# Patient Record
Sex: MALE | Race: WHITE | NOT HISPANIC OR LATINO | Employment: FULL TIME | URBAN - METROPOLITAN AREA
[De-identification: names, ages, dates, MRNs, and addresses within clinical notes are randomized per-mention and may not be internally consistent; named-entity substitution may affect disease eponyms.]

---

## 2018-01-12 ENCOUNTER — OFFICE VISIT (OUTPATIENT)
Dept: OTHER | Facility: MEDICAL CENTER | Age: 68
End: 2018-01-12
Payer: COMMERCIAL

## 2018-01-12 ENCOUNTER — HOSPITAL ENCOUNTER (OUTPATIENT)
Facility: MEDICAL CENTER | Age: 68
End: 2018-01-12
Attending: FAMILY MEDICINE
Payer: COMMERCIAL

## 2018-01-12 VITALS
TEMPERATURE: 98.7 F | BODY MASS INDEX: 25.71 KG/M2 | RESPIRATION RATE: 14 BRPM | HEART RATE: 73 BPM | HEIGHT: 72 IN | DIASTOLIC BLOOD PRESSURE: 78 MMHG | SYSTOLIC BLOOD PRESSURE: 116 MMHG | WEIGHT: 189.8 LBS | OXYGEN SATURATION: 100 %

## 2018-01-12 DIAGNOSIS — Z87.898 HISTORY OF VERTIGO: ICD-10-CM

## 2018-01-12 DIAGNOSIS — E55.9 VITAMIN D DEFICIENCY: ICD-10-CM

## 2018-01-12 DIAGNOSIS — E78.5 DYSLIPIDEMIA: ICD-10-CM

## 2018-01-12 DIAGNOSIS — K44.9 HIATAL HERNIA: ICD-10-CM

## 2018-01-12 DIAGNOSIS — Z00.00 WELL ADULT EXAM: ICD-10-CM

## 2018-01-12 DIAGNOSIS — Z28.39 BEHIND ON IMMUNIZATIONS: ICD-10-CM

## 2018-01-12 DIAGNOSIS — N43.3 HYDROCELE IN ADULT: Chronic | ICD-10-CM

## 2018-01-12 DIAGNOSIS — R73.03 PREDIABETES: ICD-10-CM

## 2018-01-12 DIAGNOSIS — Z81.8 FAMILY HISTORY OF FIRST DEGREE RELATIVE WITH DEMENTIA: ICD-10-CM

## 2018-01-12 DIAGNOSIS — Z82.49 FAMILY HISTORY OF HYPERTENSION: ICD-10-CM

## 2018-01-12 DIAGNOSIS — Z80.3 FAMILY HISTORY OF BREAST CANCER IN SISTER: ICD-10-CM

## 2018-01-12 DIAGNOSIS — Z80.0 FAMILY HISTORY OF COLON CANCER IN FATHER: ICD-10-CM

## 2018-01-12 DIAGNOSIS — I10 ESSENTIAL HYPERTENSION: Primary | ICD-10-CM

## 2018-01-12 DIAGNOSIS — M19.90 OSTEOARTHRITIS, UNSPECIFIED OSTEOARTHRITIS TYPE, UNSPECIFIED SITE: ICD-10-CM

## 2018-01-12 DIAGNOSIS — F41.9 ANXIETY: ICD-10-CM

## 2018-01-12 DIAGNOSIS — Z87.448 HISTORY OF NEPHRITIS: ICD-10-CM

## 2018-01-12 LAB
APPEARANCE UR: ABNORMAL
BACTERIA #/AREA URNS HPF: NEGATIVE /HPF
BILIRUB UR QL STRIP.AUTO: NEGATIVE
COLOR UR: YELLOW
CREAT UR-MCNC: 202 MG/DL
EPI CELLS #/AREA URNS HPF: NEGATIVE /HPF
GLUCOSE UR STRIP.AUTO-MCNC: NEGATIVE MG/DL
HYALINE CASTS #/AREA URNS LPF: ABNORMAL /LPF
KETONES UR STRIP.AUTO-MCNC: NEGATIVE MG/DL
LEUKOCYTE ESTERASE UR QL STRIP.AUTO: NEGATIVE
MICRO URNS: ABNORMAL
MICROALBUMIN UR-MCNC: <0.7 MG/DL
MICROALBUMIN/CREAT UR: NORMAL MG/G (ref 0–30)
NITRITE UR QL STRIP.AUTO: NEGATIVE
PH UR STRIP.AUTO: 5 [PH]
PROT UR QL STRIP: NEGATIVE MG/DL
RBC # URNS HPF: ABNORMAL /HPF
RBC UR QL AUTO: NEGATIVE
SP GR UR STRIP.AUTO: 1.03
UROBILINOGEN UR STRIP.AUTO-MCNC: 0.2 MG/DL
WBC #/AREA URNS HPF: ABNORMAL /HPF

## 2018-01-12 PROCEDURE — 99204 OFFICE O/P NEW MOD 45 MIN: CPT | Performed by: FAMILY MEDICINE

## 2018-01-12 NOTE — PROGRESS NOTES
"SUBJECTIVE:    Chief Complaint   Patient presents with   • Establish Care   • Other     History of Nephritis 2017; clinically resolved   • Hiatal Hernia     Small   • Other     History of Vertigo age approx. 64   • Other     History of L knee injury; now osteoarthritis   • Other     Family hx of Dementia (mother)   • Other     Family hx of HTN and Cancer in Father   • Other     Family hx of Breast CA (sister)       Jose Fallon is a 67 y.o. male,   New Patient    PROBLEM #1-HISTORY OF PRESENT ILLNESS  Existing Problems, but not previously addressed by me  PATIENT STATEMENT OF PROBLEM - History of Urological Issue 2017  ONSET - July '17  COURSE - \"gas\" pain, lower abdomen x 2-3 days, improved w/ ibuprofen, then worsened, went to [facility] improved by 4th day.  Left kidney and Left ureter enlarged/dilated, went to Urology ([facility]), U/S revealed \"Nephritis.\"  Went to Nephrologist (Dr. Canas), and F/U labs and imaging were normal.  Saw Urologist (Dr. Feliciano), who did another CT scan.  Asymptomatic presently.   INTENSITY/STATUS/LOCATION/RADIATION - nil now/ asymptomatic/ as above  AGGRAVATORS - ?  RELIEVERS - as above  TREATMENTS/COMPLIANCE/@GOAL? - as above/ good/ clinically yes    PROBLEM #2-HISTORY OF PRESENT ILLNESS  Existing Problem, but not previously addressed by me  PATIENT STATEMENT OF PROBLEM - Family history of Dementia, HTN, Colon Cancer in Father, Breast Cancer in Sister.     No Known Allergies    Patient Active Problem List    Diagnosis Date Noted   • Essential hypertension 01/29/2018   • Dyslipidemia 01/29/2018   • Anxiety 01/29/2018   • Prediabetes 01/29/2018   • Vitamin D deficiency 01/29/2018   • Well adult exam 01/29/2018   • Family history of first degree relative with dementia 01/29/2018   • Family history of colon cancer in father 01/29/2018   • Family history of hypertension 01/29/2018   • Family history of breast cancer in sister 01/29/2018   • Hiatal hernia 01/29/2018   • History of " "vertigo 2018   • History of nephritis 2018   • Osteoarthritis 2018   • Behind on immunizations 2018   • Hydrocele in adult, left 2018       Outpatient Encounter Prescriptions as of 2018   Medication Sig Dispense Refill   • irbesartan-hydrochlorothiazide (AVALIDE) 150-12.5 MG per tablet Take 1 Tab by mouth every day. 90 Tab 3   • simvastatin (ZOCOR) 20 MG Tab Take 1 Tab by mouth every day. 90 Tab 4   • escitalopram (LEXAPRO) 10 MG Tab Take 1 Tab by mouth every day. 90 Tab 4   • metformin ER (GLUCOPHAGE XR) 500 MG TABLET SR 24 HR Take 1 Tab by mouth every day. 90 Tab 4   • aspirin EC (ECOTRIN) 81 MG Tablet Delayed Response Take 1 Tab by mouth.     • Cholecalciferol (VITAMIN D) 2000 units Cap Take 1 Cap by mouth every day.     • Multiple Vitamins-Minerals (OCUVITE ADULT 50+) Cap Take 1 Each by mouth.       No facility-administered encounter medications on file as of 2018.        Social History   Substance Use Topics   • Smoking status: Former Smoker     Packs/day: 1.00     Years: 2.00     Types: Cigarettes     Start date: 1957     Quit date: 1959   • Smokeless tobacco: Never Used   • Alcohol use 4.2 oz/week     7 Cans of beer per week       Family History   Problem Relation Age of Onset   • Dementia Mother 70      @ 81 years   • Hypertension Father 78      @ 79 years   • Cancer Father 77     Colon Cancer   • Kidney Disease Father 78     Kidney failure was cause of death   • Breast Cancer Sister 50   • Heart Attack Paternal Grandmother 78     Fatal MI   • Heart Attack Paternal Grandfather 60     Fatal MI       Patient's Past, Social, and Family History reviewed and updated by me in EPIC today.    REVIEW OF SYMPTOMS:               Pertinent Positives as above.    All other systems reviewed and negative.     OBJECTIVE:    /78   Pulse 73   Temp 37.1 °C (98.7 °F)   Resp 14   Ht 1.816 m (5' 11.5\")   Wt 86.1 kg (189 lb 12.8 oz)   SpO2 100%   BMI 26.10 " kg/m²   Body mass index is 26.1 kg/m².    Well developed, well nourished male, no acute distress, non-ill appearing. Comfortable, appears stated age, pleasant and cooperative  HEAD: atraumatic, normocephalic   EYES: Conjunctiva normal, extra-occular movements intact, PERRLA, acuity grossly intact.   EARS/NOSE/THROAT: TM's normal, no signs or symptoms of infection, no perforation, no hemotympanum, acuity grossly intact. Oropharynx: benign, no lesions noted. Nares: benign.   NECK: supple, no adenopathy, no thyromegaly or nodules, no jugular vein distention, no carotid bruits.   CHEST/LUNGS: clear to auscultation and percussion bilaterally. No adventitious breath sounds.   CARDIOVASCULAR: regular rate and rhythm, no murmur. Point of maximum intensity not displaced. Good central and peripheral pulses.   BACK: no CVA tenderness.   ABDOMEN: soft, non-tender, non-distended, no masses, no hepatosplenomegaly. Normal active bowel tones.   : deferred.   Rectal: deferred.   Extremities: warm/well-perfused, no cyanosis, clubbing, or edema.   SKIN: clear, unbroken, no rashes, normal turgor.   Neuro: Mental Status: Alert and Oriented x 3. CN II-XII grossly intact. Gait normal. Non-focal, intact. Normal strength, sensation    ASSESSMENT:    1. Essential hypertension  irbesartan-hydrochlorothiazide (AVALIDE) 150-12.5 MG per tablet    aspirin EC (ECOTRIN) 81 MG Tablet Delayed Response   2. Dyslipidemia  simvastatin (ZOCOR) 20 MG Tab   3. Anxiety  escitalopram (LEXAPRO) 10 MG Tab   4. Prediabetes  metformin ER (GLUCOPHAGE XR) 500 MG TABLET SR 24 HR   5. Vitamin D deficiency  Cholecalciferol (VITAMIN D) 2000 units Cap   6. Hydrocele in adult, left     7. Well adult exam  Multiple Vitamins-Minerals (OCUVITE ADULT 50+) Cap   8. Family history of first degree relative with dementia     9. Family history of colon cancer in father     10. Family history of hypertension     11. Family history of breast cancer in sister     12. Hiatal  hernia     13. History of vertigo     14. History of nephritis     15. Osteoarthritis, unspecified osteoarthritis type, unspecified site     16. Behind on immunizations         PLAN:    Total Face-to-Face time spent with patient: 60 minutes  Amount of time spent counseling patient and/or coordinating care: 40 minutes    The nature of patient counseling as below:  -Patient Education, including below topics:  -Differential Diagnoses and treatment options discussed  -Risks, benefits, alternatives discussed  -Health Maintenance Exam issues discussed  -Rest/ fluids/ close observation/ OTC medications  -Exercise  -Dietary recommendations discussed  -Weight Loss strategies discussed  -QUIT SMOKING!!!  -Therapeutic Lifestyle Changes discussed    The nature of coordination of care as below:  -Continue (other) present chronic medications  -Blood Pressure Diary  -Labs: Initial Panel A...UACR/UA/U-F2 iso  -Referrals: none  -Other: pre Exec H&P imaging  -Seek medical attention immediately if worse    FOLLOW-UP:  -soon for Exec H&P and F/U  -and sooner if test/consult results warrant  And for Health Care Maintenance Exams  And as needed.

## 2018-01-18 ENCOUNTER — HOSPITAL ENCOUNTER (OUTPATIENT)
Dept: RADIOLOGY | Facility: MEDICAL CENTER | Age: 68
End: 2018-01-18
Attending: FAMILY MEDICINE
Payer: COMMERCIAL

## 2018-01-18 ENCOUNTER — NON-PROVIDER VISIT (OUTPATIENT)
Dept: OTHER | Facility: MEDICAL CENTER | Age: 68
End: 2018-01-18
Payer: COMMERCIAL

## 2018-01-18 DIAGNOSIS — Z23 NEED FOR PNEUMOCOCCAL VACCINATION: ICD-10-CM

## 2018-01-18 DIAGNOSIS — Z00.00 WELL ADULT EXAM: ICD-10-CM

## 2018-01-18 DIAGNOSIS — Z23 NEED FOR TDAP VACCINATION: ICD-10-CM

## 2018-01-18 PROCEDURE — 90715 TDAP VACCINE 7 YRS/> IM: CPT | Performed by: FAMILY MEDICINE

## 2018-01-18 PROCEDURE — 90472 IMMUNIZATION ADMIN EACH ADD: CPT | Performed by: FAMILY MEDICINE

## 2018-01-18 PROCEDURE — 71046 X-RAY EXAM CHEST 2 VIEWS: CPT

## 2018-01-18 PROCEDURE — 90471 IMMUNIZATION ADMIN: CPT | Performed by: FAMILY MEDICINE

## 2018-01-18 PROCEDURE — 306734 HCHG ADVANCED VASCULAR SCREENING

## 2018-01-18 PROCEDURE — 4410556 CT-CARDIAC SCORING

## 2018-01-18 PROCEDURE — 76700 US EXAM ABDOM COMPLETE: CPT

## 2018-01-18 PROCEDURE — 90732 PPSV23 VACC 2 YRS+ SUBQ/IM: CPT | Performed by: FAMILY MEDICINE

## 2018-01-29 PROBLEM — Z80.0 FAMILY HISTORY OF COLON CANCER IN FATHER: Status: ACTIVE | Noted: 2018-01-29

## 2018-01-29 PROBLEM — Z87.448 HISTORY OF NEPHRITIS: Status: ACTIVE | Noted: 2018-01-29

## 2018-01-29 PROBLEM — K44.9 HIATAL HERNIA: Status: ACTIVE | Noted: 2018-01-29

## 2018-01-29 PROBLEM — R73.03 PREDIABETES: Status: ACTIVE | Noted: 2018-01-29

## 2018-01-29 PROBLEM — E78.5 DYSLIPIDEMIA: Status: ACTIVE | Noted: 2018-01-29

## 2018-01-29 PROBLEM — F41.9 ANXIETY: Status: ACTIVE | Noted: 2018-01-29

## 2018-01-29 PROBLEM — E55.9 VITAMIN D DEFICIENCY: Status: ACTIVE | Noted: 2018-01-29

## 2018-01-29 PROBLEM — Z82.49 FAMILY HISTORY OF HYPERTENSION: Status: ACTIVE | Noted: 2018-01-29

## 2018-01-29 PROBLEM — Z00.00 WELL ADULT EXAM: Status: ACTIVE | Noted: 2018-01-29

## 2018-01-29 PROBLEM — M19.90 OSTEOARTHRITIS: Status: ACTIVE | Noted: 2018-01-29

## 2018-01-29 PROBLEM — Z28.39 BEHIND ON IMMUNIZATIONS: Status: ACTIVE | Noted: 2018-01-29

## 2018-01-29 PROBLEM — Z81.8 FAMILY HISTORY OF FIRST DEGREE RELATIVE WITH DEMENTIA: Status: ACTIVE | Noted: 2018-01-29

## 2018-01-29 PROBLEM — Z87.898 HISTORY OF VERTIGO: Status: ACTIVE | Noted: 2018-01-29

## 2018-01-29 PROBLEM — I10 ESSENTIAL HYPERTENSION: Status: ACTIVE | Noted: 2018-01-29

## 2018-01-29 PROBLEM — Z80.3 FAMILY HISTORY OF BREAST CANCER IN SISTER: Status: ACTIVE | Noted: 2018-01-29

## 2018-01-29 RX ORDER — SIMVASTATIN 20 MG
20 TABLET ORAL DAILY
Qty: 90 TAB | Refills: 4
Start: 2018-01-29 | End: 2018-06-28 | Stop reason: SDUPTHER

## 2018-01-29 RX ORDER — IRBESARTAN AND HYDROCHLOROTHIAZIDE 150; 12.5 MG/1; MG/1
1 TABLET, FILM COATED ORAL DAILY
Qty: 90 TAB | Refills: 3
Start: 2018-01-29 | End: 2018-08-23 | Stop reason: SDUPTHER

## 2018-01-29 RX ORDER — ESCITALOPRAM OXALATE 10 MG/1
10 TABLET ORAL DAILY
Qty: 90 TAB | Refills: 4
Start: 2018-01-29 | End: 2018-09-10 | Stop reason: SDUPTHER

## 2018-01-29 RX ORDER — MV-MN/OM3/DHA/EPA/FISH/LUT/ZEA 250-5-1 MG
1 CAPSULE ORAL
COMMUNITY
Start: 2018-01-29 | End: 2022-12-07

## 2018-01-29 RX ORDER — METFORMIN HYDROCHLORIDE 500 MG/1
500 TABLET, EXTENDED RELEASE ORAL DAILY
Qty: 90 TAB | Refills: 4
Start: 2018-01-29 | End: 2018-11-12 | Stop reason: SDUPTHER

## 2018-01-29 RX ORDER — MULTIVIT-MIN/IRON/FOLIC ACID/K 18-600-40
1 CAPSULE ORAL DAILY
COMMUNITY
Start: 2018-01-29 | End: 2020-09-28

## 2018-01-30 NOTE — PATIENT INSTRUCTIONS
Current Outpatient Prescriptions Ordered in [facility]   Medication Sig Dispense Refill   • irbesartan-hydrochlorothiazide (AVALIDE) 150-12.5 MG per tablet Take 1 Tab by mouth every day. 90 Tab 3   • simvastatin (ZOCOR) 20 MG Tab Take 1 Tab by mouth every day. 90 Tab 4   • escitalopram (LEXAPRO) 10 MG Tab Take 1 Tab by mouth every day. 90 Tab 4   • metformin ER (GLUCOPHAGE XR) 500 MG TABLET SR 24 HR Take 1 Tab by mouth every day. 90 Tab 4   • aspirin EC (ECOTRIN) 81 MG Tablet Delayed Response Take 1 Tab by mouth.     • Cholecalciferol (VITAMIN D) 2000 units Cap Take 1 Cap by mouth every day.     • Multiple Vitamins-Minerals (OCUVITE ADULT 50+) Cap Take 1 Each by mouth.       No current Epic-ordered facility-administered medications on file.

## 2018-02-07 PROBLEM — K64.4 INTERNAL AND EXTERNAL HEMORRHOIDS WITHOUT COMPLICATION: Status: ACTIVE | Noted: 2018-02-07

## 2018-02-07 PROBLEM — K64.8 INTERNAL AND EXTERNAL HEMORRHOIDS WITHOUT COMPLICATION: Status: ACTIVE | Noted: 2018-02-07

## 2018-02-07 PROBLEM — K57.30 DIVERTICULOSIS OF COLON: Status: ACTIVE | Noted: 2018-02-07

## 2018-02-08 ENCOUNTER — NON-PROVIDER VISIT (OUTPATIENT)
Dept: OTHER | Facility: MEDICAL CENTER | Age: 68
End: 2018-02-08

## 2018-02-08 ENCOUNTER — OFFICE VISIT (OUTPATIENT)
Dept: OTHER | Facility: MEDICAL CENTER | Age: 68
End: 2018-02-08

## 2018-02-08 VITALS
HEART RATE: 91 BPM | TEMPERATURE: 99.2 F | BODY MASS INDEX: 25.76 KG/M2 | RESPIRATION RATE: 14 BRPM | WEIGHT: 184 LBS | OXYGEN SATURATION: 93 % | DIASTOLIC BLOOD PRESSURE: 70 MMHG | SYSTOLIC BLOOD PRESSURE: 112 MMHG | HEIGHT: 71 IN

## 2018-02-08 DIAGNOSIS — I10 ESSENTIAL HYPERTENSION: ICD-10-CM

## 2018-02-08 DIAGNOSIS — Z00.00 WELL ADULT EXAM: Primary | ICD-10-CM

## 2018-02-08 DIAGNOSIS — I25.84 CORONARY ATHEROSCLEROSIS DUE TO CALCIFIED CORONARY LESION: ICD-10-CM

## 2018-02-08 DIAGNOSIS — R73.09 ELEVATED HEMOGLOBIN A1C: ICD-10-CM

## 2018-02-08 DIAGNOSIS — R94.31 ABNORMAL RESTING ECG FINDINGS: ICD-10-CM

## 2018-02-08 DIAGNOSIS — R93.1 AGATSTON CAC SCORE 200-399: ICD-10-CM

## 2018-02-08 DIAGNOSIS — Z78.9 POSITIVE GENETIC TEST FOR APOLIPOPROTEIN E4 GENOTYPE: ICD-10-CM

## 2018-02-08 DIAGNOSIS — R73.03 PREDIABETES: ICD-10-CM

## 2018-02-08 DIAGNOSIS — E53.8 B12 DEFICIENCY: ICD-10-CM

## 2018-02-08 DIAGNOSIS — I65.22 CAROTID ATHEROSCLEROSIS, LEFT: ICD-10-CM

## 2018-02-08 DIAGNOSIS — Z72.9 LIFESTYLE PROBLEMS: ICD-10-CM

## 2018-02-08 DIAGNOSIS — E78.5 DYSLIPIDEMIA: ICD-10-CM

## 2018-02-08 DIAGNOSIS — I25.10 CORONARY ATHEROSCLEROSIS DUE TO CALCIFIED CORONARY LESION: ICD-10-CM

## 2018-02-08 DIAGNOSIS — N28.1 BILATERAL RENAL CYSTS: ICD-10-CM

## 2018-02-08 DIAGNOSIS — R53.81 PHYSICAL DECONDITIONING: ICD-10-CM

## 2018-02-08 NOTE — PATIENT INSTRUCTIONS
Current Outpatient Prescriptions Ordered in [facility]   Medication Sig Dispense Refill   • irbesartan-hydrochlorothiazide (AVALIDE) 150-12.5 MG per tablet Take 1 Tab by mouth every day. 90 Tab 3   • simvastatin (ZOCOR) 20 MG Tab Take 1 Tab by mouth every day. 90 Tab 4   • escitalopram (LEXAPRO) 10 MG Tab Take 1 Tab by mouth every day. 90 Tab 4   • metformin ER (GLUCOPHAGE XR) 500 MG TABLET SR 24 HR Take 1 Tab by mouth every day. 90 Tab 4   • aspirin EC (ECOTRIN) 81 MG Tablet Delayed Response Take 1 Tab by mouth.     • Cholecalciferol (VITAMIN D) 2000 units Cap Take 1 Cap by mouth every day.     • Multiple Vitamins-Minerals (OCUVITE ADULT 50+) Cap Take 1 Each by mouth.       No current Epic-ordered facility-administered medications on file.

## 2018-02-08 NOTE — PROGRESS NOTES
"[facility] [address]    EXECUTIVE HISTORY AND PHYSICAL  Performed by Dr. Pablito Kessler    SUBJECTIVE:    Chief Complaint   Patient presents with   • Executive Physical   • Abnormal EKG     Resting   • Coronary Artery Disease     Per CTCS of 377.7, multivessel   • Hyperlipidemia     including Apo E4 genotype   • Abnormal Labs   • Other     Mild left carotid atherosclerosis   • Other     small bilateral renal cysts       Jose Fallon is a 67 y.o. male,   Established Patient @ [facility]    Preventive medicine issues discussed:  abuse, aspirin, dental, Alcohol, Tobacco, HIV/AIDS, injuries, mental health/depression, nutrition, exercise, occupational health, UV exposure, advanced directives, Cancer Screening. Vaccines.      PROBLEM #1-HISTORY OF PRESENT ILLNESS  PATIENT STATEMENT OF PROBLEM - Cardiovascular related issues  ONSET - discussed today  COURSE - patient has known dyslipidemia, HTN, and pre-diabetes, and takes simvastatin, avalide, aspirin and metformin.  13 years ago, he had an abnormal Stress ECHOcardiogram (ST depression in V3-V6 with exercise, but normal resting ECG), but resultant Thallium Stress test was reported as normal to the patient.  He denies and known history of CVD/CeVD event.  CT cardiac score 13 years ago was reported as \"very low\".  Current pertinent finding: today's resting ECG is abnormal with ST-T elevation primarily in V2, less so in V3, but normal exercise component.  Also, CT-cardiac score is moderately severe at 377.7, multivessel.  Mild L carotid plaque on Ultrasound.  cIMT Vascular age 68 vs Chronological age of 67 years.  Current pertinent labs:   HIGH & INT RISK: LDL-P/ApoA-1/Sterol Hyperabsorption/FIBR/A1c(5.8%)/Apo E4 genotype/Low Omega 3's/folate/Co-Q10/Fatty Acid imbalance/low normal B12    Counseled.   INTENSITY/STATUS/LOCATION/RADIATION - variable/ present/ as above  AGGRAVATORS - Multifactorial including inadequate Therapeutic Lifestyle Changes " currently  RELIEVERS - meds?  TREATMENTS/COMPLIANCE/@GOAL? - same/ inadequate Therapeutic Lifestyle Changes / no    PROBLEM #2-HISTORY OF PRESENT ILLNESS  PATIENT STATEMENT OF PROBLEM - Asymptomatic bilateral small renal cysts  ONSET - discussed today, previously known  COURSE - asymptomatic. See most recent SOAP note for recent renal history.     No Known Allergies    Patient Active Problem List    Diagnosis Date Noted   • Diverticulosis of colon, per 2/5/16 Colonoscopy 02/07/2018   • Internal and external hemorrhoids without complication, per 2/5/16 Colonoscopy 02/07/2018   • Essential hypertension 01/29/2018   • Dyslipidemia 01/29/2018   • Anxiety 01/29/2018   • Prediabetes 01/29/2018   • Vitamin D deficiency 01/29/2018   • Well adult exam 01/29/2018   • Family history of first degree relative with dementia 01/29/2018   • Family history of colon cancer in father 01/29/2018   • Family history of hypertension 01/29/2018   • Family history of breast cancer in sister 01/29/2018   • Hiatal hernia 01/29/2018   • History of vertigo 01/29/2018   • History of nephritis 01/29/2018   • Osteoarthritis 01/29/2018   • Behind on immunizations 01/29/2018   • Hydrocele in adult, left 01/12/2018       Current Outpatient Prescriptions on File Prior to Visit   Medication Sig Dispense Refill   • irbesartan-hydrochlorothiazide (AVALIDE) 150-12.5 MG per tablet Take 1 Tab by mouth every day. 90 Tab 3   • simvastatin (ZOCOR) 20 MG Tab Take 1 Tab by mouth every day. 90 Tab 4   • escitalopram (LEXAPRO) 10 MG Tab Take 1 Tab by mouth every day. 90 Tab 4   • metformin ER (GLUCOPHAGE XR) 500 MG TABLET SR 24 HR Take 1 Tab by mouth every day. 90 Tab 4   • aspirin EC (ECOTRIN) 81 MG Tablet Delayed Response Take 1 Tab by mouth.     • Cholecalciferol (VITAMIN D) 2000 units Cap Take 1 Cap by mouth every day.     • Multiple Vitamins-Minerals (OCUVITE ADULT 50+) Cap Take 1 Each by mouth.       No current facility-administered medications on file prior  "to visit.        Social History     Social History   • Marital status:      Spouse name: N/A   • Number of children: N/A   • Years of education: N/A     Occupational History   •       [employer]     Social History Main Topics   • Smoking status: Former Smoker     Packs/day: 1.00     Years: 2.00     Types: Cigarettes     Start date: 1957     Quit date: 1959   • Smokeless tobacco: Never Used   • Alcohol use 4.2 oz/week     7 Cans of beer per week   • Drug use: No   • Sexual activity: Not Currently     Other Topics Concern   • Not on file     Social History Narrative   • No narrative on file       Family History   Problem Relation Age of Onset   • Dementia Mother 70      @ 81 years   • Hypertension Father 78      @ 79 years   • Cancer Father 77     Colon Cancer   • Kidney Disease Father 78     Kidney failure was cause of death   • Breast Cancer Sister 50   • Heart Attack Paternal Grandmother 78     Fatal MI   • Heart Attack Paternal Grandfather 60     Fatal MI       Patient's Past, Social, and Family History reviewed     REVIEW OF SYMPTOMS:               Pertinent Positives as above.    All other systems reviewed and negative.     OBJECTIVE:    /70   Pulse 91   Temp 37.3 °C (99.2 °F)   Resp 14   Ht 1.816 m (5' 11.5\")   Wt 83.5 kg (184 lb)   SpO2 93%   BMI 25.31 kg/m²   Body mass index is 25.31 kg/m².    Well developed, well nourished male, no acute distress, non-ill appearing. Comfortable, appears stated age, pleasant and cooperative, Alert and Oriented x 3.   HEAD: atraumatic, normocephalic   EYES: Conjunctiva normal, EOMI, PERRLA, acuity grossly intact.   EARS/NOSE/THROAT: TM's normal, no SSX of infection, no perforation, no hemotympanum, acuity grossly intact. Oropharynx: benign, no lesions noted. Nares: benign.   NECK: supple, no adenopathy, no thyromegaly or nodules, no JVD, no carotid bruits.   CHEST/LUNGS: clear to auscultation and percussion bilaterally. " No adventitious breath sounds.   CARDIOVASCULAR: regular rate and rhythm, no murmur. PMI not displaced. Good central and peripheral pulses.   BACK: no CVA tenderness.   ABDOMEN: soft, non-tender, non-distended, no masses, no hepatosplenomegaly. Normal active bowel tones.   : deferred.   Rectal: deferred.   Extremities: warm/well-perfused, no cyanosis, clubbing, or edema.   SKIN: clear, unbroken, no rashes, normal turgor.   Neuro: Mental Status: Alert and Oriented x 3. CN II-XII grossly intact. Gait normal. Non-focal, intact. Normal strength, sensation    EXERCISE STRESS TEST REPORT:    Interpreted by me    INTERPRETATION:  Resting ECG abnormal with ST-T elevation in V2, and less so in V3. Patient achieved 93% of maximum predicted heart rate with physiological response in blood pressure and no associated ST segment depression.   Poor exercise tolerance, reaching above target HR in Stage 1.  Also, during exercise, specifically no associated worsened ST elevation, no significant ventricular extrasystoles, no ventricular tachycardia, no new atrial fibrillation or supraventricular tachycardia, and  no new heart blocks.  Patient denied chest pain, severe dyspnea, dizziness, or ataxia.     CONCLUSION:  Abnormal Resting ECG with ST-T elevation in V2, and less so in V3. Normal Exercise Stress Test indicating low probability of flow-limiting coronary artery disease.     ASSESSMENT:    Encounter Diagnoses   Name Primary?   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    • Carotid atherosclerosis, left, mild    • Elevated hemoglobin A1c    • Bilateral renal cysts         PLAN:    Total Face-to-Face time spent with patient: 90 minutes  Amount of time spent counseling patient and/or coordinating care: 50 minutes    The nature of patient counseling as below:  -Patient Education  -Differential Diagnoses and treatment options discussed  -Risks, benefits, alternatives discussed  -Labs reviewed with patient in detail  -Imaging/ECG/ETT/PFT/vision/hearing reports reviewed with patient in detail  -Health Maintenance Exam issues discussed  -Exercise  -Dietary recommendations discussed  -I recommended patient take advantage of complimentary Dietician Coaching through [employer]  -Therapeutic Lifestyle Changes discussed    The nature of coordination of care as below:  -Medications added: We discussed possible ezetimibe therapy, but no new ezetimibe prescription today.    -OTC Sublingual Vitamin B12 1000 mcg/day  -Medications discontinued: none  -Medications adjusted: none  -Continue present chronic medications  -Referrals: Cardiology referral    -Intensive Cardiac Rehabilitation referral   -Seek medical attention immediately if worse    FOLLOW-UP:  -With me in 1 month

## 2018-02-08 NOTE — LETTER
"February 8, 2018        Jose Leeanne  [address]  [place] [address]        Dear Jose:    Thank you for participating in [facility].  We covered a great deal of information, and I have summarized my Assessment and Plan below.  Please carefully review all the information in this packet.  I do suggest you schedule an appointment with me in one month for follow-up.    Overall, I consider you to be in good health, although your current lack of exercise, and dietary pattern could both improved.  In terms of Cardiovascular related issues, you have the following: abnormal resting ECG, moderately severe coronary artery calcifications, physical deconditioning, dyslipidemia including Apo E4 genotype, hypertension, pre-diabetes, B12 deficiency, elevated A1c, mild left carotid atherosclerosis.  The best treatment for most of these maladies is Therapeutic Lifestyle Changes.  Specifically, I strongly recommend eating \"real food, mostly plants, not too much\", daily exercise, striving for a normal weight/BMI, active stress management, 7-8 hours of quality sleep per night, a loving social environment, and an altruistic philosophy. In terms of medication, we did discuss ezetimibe for further dyslipidemia treatment, but we will wait to see how your Cardiology consultation goes.  I do want you to begin daily OTC Vitamin B12 sublingual 1000 mcg.  I will also see if you insurance will cover a course of Intensive Cardiac Rehabilitation as we discussed.  If you have any questions or concerns, please don't hesitate to call.        Sincerely,        Pablito Kessler M.D.                  ASSESSMENT:    Encounter Diagnoses   Name Primary?   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    • Carotid atherosclerosis, left, mild    • Elevated hemoglobin A1c    • Bilateral renal cysts    PLAN:    Total Face-to-Face time spent with patient: 90 minutes  Amount of time spent counseling patient and/or coordinating care: 50 minutes    The nature of patient counseling as below:  -Patient Education  -Differential Diagnoses and treatment options discussed  -Risks, benefits, alternatives discussed  -Labs reviewed with patient in detail  -Imaging/ECG/ETT/PFT/vision/hearing reports reviewed with patient in detail  -Health Maintenance Exam issues discussed  -Exercise  -Dietary recommendations discussed  -I recommended patient take advantage of complimentary Dietician Coaching through [employer]  -Therapeutic Lifestyle Changes discussed    The nature of coordination of care as below:  -Medications added: We discussed possible ezetimibe therapy, but no new ezetimibe prescription today.    -OTC Sublingual Vitamin B12 1000 mcg/day  -Medications discontinued: none  -Medications adjusted: none  -Continue present chronic medications  -Referrals: Cardiology referral    -Intensive Cardiac Rehabilitation referral   -Seek medical attention immediately if worse    FOLLOW-UP:  -With me in one month

## 2018-02-09 ENCOUNTER — OFFICE VISIT (OUTPATIENT)
Dept: CARDIOLOGY | Facility: MEDICAL CENTER | Age: 68
End: 2018-02-09
Payer: COMMERCIAL

## 2018-02-09 VITALS
BODY MASS INDEX: 25.47 KG/M2 | DIASTOLIC BLOOD PRESSURE: 72 MMHG | SYSTOLIC BLOOD PRESSURE: 120 MMHG | HEART RATE: 82 BPM | OXYGEN SATURATION: 94 % | WEIGHT: 188 LBS | HEIGHT: 72 IN

## 2018-02-09 DIAGNOSIS — I10 ESSENTIAL HYPERTENSION: ICD-10-CM

## 2018-02-09 DIAGNOSIS — E78.5 DYSLIPIDEMIA: ICD-10-CM

## 2018-02-09 DIAGNOSIS — R94.31 NONSPECIFIC ABNORMAL ELECTROCARDIOGRAM (ECG) (EKG): ICD-10-CM

## 2018-02-09 DIAGNOSIS — R93.1 AGATSTON CAC SCORE 200-399: ICD-10-CM

## 2018-02-09 PROCEDURE — 99245 OFF/OP CONSLTJ NEW/EST HI 55: CPT | Performed by: INTERNAL MEDICINE

## 2018-02-09 ASSESSMENT — ENCOUNTER SYMPTOMS
FALLS: 0
DEPRESSION: 0
DIZZINESS: 0
LOSS OF CONSCIOUSNESS: 0
PALPITATIONS: 0
PND: 0
ABDOMINAL PAIN: 0
ORTHOPNEA: 0
SHORTNESS OF BREATH: 0

## 2018-02-09 NOTE — LETTER
"     [facility]   [address]   [address]  [place] [address]  Phone: [phone]  Fax: [phone]              Jose Fallon  1950    Encounter Date: 2018    Heike Azul M.D.          PROGRESS NOTE:  Subjective:   Jose Fallon is a 67 y.o. male with hypertension, hyperlipidemia and prediabetes who was referred to cardiology clinic for an abnormal EKG. Patient underwent a treadmill test about 13 years ago which was abnormal. Thereafter he was referred for a pharmacologic test which was normal. Patient is currently a part of the [facility] health program. He underwent a coronary calcium score, detailed below. He subsequently saw Dr. Kessler and underwent a treadmill test, detailed below. He was also noted to have an abnormal EKG and was referred here for further management.    Patient does not exercise much. He can do yard work without any symptoms. Does play golf but does not actually walk the whole distance. Denies any chest discomfort or dyspnea. No palpitations or dizziness.    His blood pressure is usually well-controlled like it is today.    He has been tolerating his Zocor without any side effects.    About 5 or 6 years ago, patient reports having 2 episodes of right-sided weakness that was transient. At that time he was evaluated by neurology and was thought to have atypical migraines.    Past Medical History:   Diagnosis Date   • Anxiety     on Lexapro since    • Headache     \"life long\" per pt   • Hyperlipidemia    • Hypertension    • Vertigo      Past Surgical History:   Procedure Laterality Date   • HERNIA REPAIR  2006     Family History   Problem Relation Age of Onset   • Dementia Mother 70      @ 81 years   • Hypertension Father 78      @ 79 years   • Cancer Father 77     Colon Cancer   • Kidney Disease Father 78     Kidney failure was cause of death   • Breast Cancer Sister 50   • Heart Attack Paternal " "Grandmother 78     Fatal MI   • Heart Attack Paternal Grandfather 60     Fatal MI     History   Smoking Status   • Former Smoker   • Packs/day: 1.00   • Years: 2.00   • Types: Cigarettes   • Start date: 12/20/1957   • Quit date: 12/20/1959   Smokeless Tobacco   • Never Used     Drinks one beer per night. No recreational drug use.    No Known Allergies  Outpatient Encounter Prescriptions as of 2/9/2018   Medication Sig Dispense Refill   • irbesartan-hydrochlorothiazide (AVALIDE) 150-12.5 MG per tablet Take 1 Tab by mouth every day. 90 Tab 3   • simvastatin (ZOCOR) 20 MG Tab Take 1 Tab by mouth every day. 90 Tab 4   • escitalopram (LEXAPRO) 10 MG Tab Take 1 Tab by mouth every day. 90 Tab 4   • metformin ER (GLUCOPHAGE XR) 500 MG TABLET SR 24 HR Take 1 Tab by mouth every day. 90 Tab 4   • aspirin EC (ECOTRIN) 81 MG Tablet Delayed Response Take 1 Tab by mouth.     • Cholecalciferol (VITAMIN D) 2000 units Cap Take 1 Cap by mouth every day.     • Multiple Vitamins-Minerals (OCUVITE ADULT 50+) Cap Take 1 Each by mouth.       No facility-administered encounter medications on file as of 2/9/2018.      Review of Systems   Constitutional: Negative for malaise/fatigue.   Respiratory: Negative for shortness of breath.    Cardiovascular: Negative for chest pain, palpitations, orthopnea, leg swelling and PND.   Gastrointestinal: Negative for abdominal pain.   Musculoskeletal: Negative for falls.   Skin: Negative.    Neurological: Negative for dizziness and loss of consciousness.   Psychiatric/Behavioral: Negative for depression.   All other systems reviewed and are negative.       Objective:   /72   Pulse 82   Ht 1.816 m (5' 11.5\")   Wt 85.3 kg (188 lb)   SpO2 94%   BMI 25.86 kg/m²      Physical Exam   Constitutional: He is oriented to person, place, and time. No distress.   HENT:   Head: Normocephalic and atraumatic.   Eyes: Conjunctivae are normal.   Neck: Normal range of motion. Neck supple. No JVD present.   "   Cardiovascular: Normal rate, regular rhythm and normal heart sounds.  Exam reveals no gallop and no friction rub.    No murmur heard.  Pulmonary/Chest: Effort normal and breath sounds normal. No respiratory distress. He has no wheezes. He has no rales.   Abdominal: Soft. There is no tenderness.   Musculoskeletal: He exhibits no edema.   Neurological: He is alert and oriented to person, place, and time.   Skin: Skin is warm and dry. He is not diaphoretic.   Psychiatric: He has a normal mood and affect.   Nursing note and vitals reviewed.    EKG performed in 2005 was reviewed and showed normal sinus rhythm, left axis deviation, Q waves in V1 and V2. No ST-T wave changes.    Exercise treadmill test performed in February 2018. Tracings were personally reviewed. Baseline EKG shows normal sinus rhythm. Q waves in V1 through V3. Upsloping ST elevation and J-point elevation seen in the V2.  Patient exercised 2 minutes and 43 seconds on the Nain protocol. Test was reportedly stopped for target heart rate achieved. Patient denies having any symptoms. No ischemic ST-T wave changes were seen. 93% of maximum predicted target heart rate was achieved.    CT coronary calcium scoring performed in January 2018. Report was reviewed and showed a total coronary calcium score of 377.    Labs performed in January 2018 was reviewed and showed normal hemoglobin. Normal creatinine.   Total cholesterol 137, LDL 76, HDL 60, triglycerides 49.    Assessment:     1. Essential hypertension  Echocardiogram Comp w/ Cont   2. Dyslipidemia     3. Nonspecific abnormal electrocardiogram (ECG) (EKG)  Echocardiogram Comp w/ Cont   4. Agatston CAC score 200-399         Medical Decision Making:  Today's Assessment / Status / Plan:     Elevated coronary calcium score:  Abnormal EKG:     His EKG in 2005 showed anteroseptal Q waves which appears to have progressed mildly compared to the EKG performed this month. Borderline ST elevation in lead V2 is of  "unclear etiology. Patient is asymptomatic albeit his functional capacity is overall reduced significantly. He did achieve target heart rate on the treadmill test however ischemia at higher workload cannot be ruled out. I informed the patient about this and told him that he should ideally get a pharmacologic study to \"rule out\" ischemia given his decreased functional capacity on the treadmill test. However since the patient is asymptomatic, despite having elevated coronary calcium score there is no clear indication for further ischemic evaluation. Patient is agreeable with this plan and would like to defer for now. He will start a low level exercise program and increase as tolerated. Should he start having symptoms, he will let us know.  I will refer him for an echocardiogram to evaluate his wall motion given his EKG findings.    Focal weakness: Occurred about 6 years ago or so at which time it was thought to be possibly be migraines. He has not had any recurrent symptoms since. I will request an agitated saline contrast study during the echocardiogram that has been requested above.    Hypertension: Patient's blood pressure is at goal. Continue irbesartan and hydrochlorothiazide at current dose. His renal function has been stable.    Hyperlipidemia: LDL is at goal. Continue Zocor at current dose.    Return to clinic in 3 months or earlier if needed.    Thank you for allowing me to participate in the care of this patient. Please do not hesitate to contact me with any questions.    Heike Azul MD  Cardiologist  [facility]    PLEASE NOTE: This dictation was created using voice recognition software.           Pablito Kessler M.D.  [address]  [address]  [facility] [address]  VIA In Basket                 "

## 2018-02-09 NOTE — PROGRESS NOTES
"Subjective:   Jose Fallon is a 67 y.o. male with hypertension, hyperlipidemia and prediabetes who was referred to cardiology clinic for an abnormal EKG. Patient underwent a treadmill test about 13 years ago which was abnormal. Thereafter he was referred for a pharmacologic test which was normal. Patient is currently a part of the [facility] health program. He underwent a coronary calcium score, detailed below. He subsequently saw Dr. Kessler and underwent a treadmill test, detailed below. He was also noted to have an abnormal EKG and was referred here for further management.    Patient does not exercise much. He can do yard work without any symptoms. Does play golf but does not actually walk the whole distance. Denies any chest discomfort or dyspnea. No palpitations or dizziness.    His blood pressure is usually well-controlled like it is today.    He has been tolerating his Zocor without any side effects.    About 5 or 6 years ago, patient reports having 2 episodes of right-sided weakness that was transient. At that time he was evaluated by neurology and was thought to have atypical migraines.    Past Medical History:   Diagnosis Date   • Anxiety     on Lexapro since    • Headache     \"life long\" per pt   • Hyperlipidemia    • Hypertension    • Vertigo      Past Surgical History:   Procedure Laterality Date   • HERNIA REPAIR  2006     Family History   Problem Relation Age of Onset   • Dementia Mother 70      @ 81 years   • Hypertension Father 78      @ 79 years   • Cancer Father 77     Colon Cancer   • Kidney Disease Father 78     Kidney failure was cause of death   • Breast Cancer Sister 50   • Heart Attack Paternal Grandmother 78     Fatal MI   • Heart Attack Paternal Grandfather 60     Fatal MI     History   Smoking Status   • Former Smoker   • Packs/day: 1.00   • Years: 2.00   • Types: Cigarettes   • Start date: 1957   • Quit date: 1959   Smokeless Tobacco   • Never Used     Drinks " "one beer per night. No recreational drug use.    No Known Allergies  Outpatient Encounter Prescriptions as of 2/9/2018   Medication Sig Dispense Refill   • irbesartan-hydrochlorothiazide (AVALIDE) 150-12.5 MG per tablet Take 1 Tab by mouth every day. 90 Tab 3   • simvastatin (ZOCOR) 20 MG Tab Take 1 Tab by mouth every day. 90 Tab 4   • escitalopram (LEXAPRO) 10 MG Tab Take 1 Tab by mouth every day. 90 Tab 4   • metformin ER (GLUCOPHAGE XR) 500 MG TABLET SR 24 HR Take 1 Tab by mouth every day. 90 Tab 4   • aspirin EC (ECOTRIN) 81 MG Tablet Delayed Response Take 1 Tab by mouth.     • Cholecalciferol (VITAMIN D) 2000 units Cap Take 1 Cap by mouth every day.     • Multiple Vitamins-Minerals (OCUVITE ADULT 50+) Cap Take 1 Each by mouth.       No facility-administered encounter medications on file as of 2/9/2018.      Review of Systems   Constitutional: Negative for malaise/fatigue.   Respiratory: Negative for shortness of breath.    Cardiovascular: Negative for chest pain, palpitations, orthopnea, leg swelling and PND.   Gastrointestinal: Negative for abdominal pain.   Musculoskeletal: Negative for falls.   Skin: Negative.    Neurological: Negative for dizziness and loss of consciousness.   Psychiatric/Behavioral: Negative for depression.   All other systems reviewed and are negative.       Objective:   /72   Pulse 82   Ht 1.816 m (5' 11.5\")   Wt 85.3 kg (188 lb)   SpO2 94%   BMI 25.86 kg/m²     Physical Exam   Constitutional: He is oriented to person, place, and time. No distress.   HENT:   Head: Normocephalic and atraumatic.   Eyes: Conjunctivae are normal.   Neck: Normal range of motion. Neck supple. No JVD present.   Cardiovascular: Normal rate, regular rhythm and normal heart sounds.  Exam reveals no gallop and no friction rub.    No murmur heard.  Pulmonary/Chest: Effort normal and breath sounds normal. No respiratory distress. He has no wheezes. He has no rales.   Abdominal: Soft. There is no " tenderness.   Musculoskeletal: He exhibits no edema.   Neurological: He is alert and oriented to person, place, and time.   Skin: Skin is warm and dry. He is not diaphoretic.   Psychiatric: He has a normal mood and affect.   Nursing note and vitals reviewed.    EKG performed in 2005 was reviewed and showed normal sinus rhythm, left axis deviation, Q waves in V1 and V2. No ST-T wave changes.    Exercise treadmill test performed in February 2018. Tracings were personally reviewed. Baseline EKG shows normal sinus rhythm. Q waves in V1 through V3. Upsloping ST elevation and J-point elevation seen in the V2.  Patient exercised 2 minutes and 43 seconds on the Nain protocol. Test was reportedly stopped for target heart rate achieved. Patient denies having any symptoms. No ischemic ST-T wave changes were seen. 93% of maximum predicted target heart rate was achieved.    CT coronary calcium scoring performed in January 2018. Report was reviewed and showed a total coronary calcium score of 377.    Labs performed in January 2018 was reviewed and showed normal hemoglobin. Normal creatinine.   Total cholesterol 137, LDL 76, HDL 60, triglycerides 49.    Assessment:     1. Essential hypertension  Echocardiogram Comp w/ Cont   2. Dyslipidemia     3. Nonspecific abnormal electrocardiogram (ECG) (EKG)  Echocardiogram Comp w/ Cont   4. Agatston CAC score 200-399         Medical Decision Making:  Today's Assessment / Status / Plan:     Elevated coronary calcium score:  Abnormal EKG:     His EKG in 2005 showed anteroseptal Q waves which appears to have progressed mildly compared to the EKG performed this month. Borderline ST elevation in lead V2 is of unclear etiology. Patient is asymptomatic albeit his functional capacity is overall reduced significantly. He did achieve target heart rate on the treadmill test however ischemia at higher workload cannot be ruled out. I informed the patient about this and told him that he should ideally  "get a pharmacologic study to \"rule out\" ischemia given his decreased functional capacity on the treadmill test. However since the patient is asymptomatic, despite having elevated coronary calcium score there is no clear indication for further ischemic evaluation. Patient is agreeable with this plan and would like to defer for now. He will start a low level exercise program and increase as tolerated. Should he start having symptoms, he will let us know.  I will refer him for an echocardiogram to evaluate his wall motion given his EKG findings.    Focal weakness: Occurred about 6 years ago or so at which time it was thought to be possibly be migraines. He has not had any recurrent symptoms since. I will request an agitated saline contrast study during the echocardiogram that has been requested above.    Hypertension: Patient's blood pressure is at goal. Continue irbesartan and hydrochlorothiazide at current dose. His renal function has been stable.    Hyperlipidemia: LDL is at goal. Continue Zocor at current dose.    Return to clinic in 3 months or earlier if needed.    Thank you for allowing me to participate in the care of this patient. Please do not hesitate to contact me with any questions.    Heike Azul MD  Cardiologist  [facility]    PLEASE NOTE: This dictation was created using voice recognition software.       "

## 2018-02-13 ENCOUNTER — HOSPITAL ENCOUNTER (OUTPATIENT)
Dept: CARDIOLOGY | Facility: MEDICAL CENTER | Age: 68
End: 2018-02-13
Attending: INTERNAL MEDICINE
Payer: COMMERCIAL

## 2018-02-13 DIAGNOSIS — I10 ESSENTIAL HYPERTENSION: ICD-10-CM

## 2018-02-13 DIAGNOSIS — R94.31 NONSPECIFIC ABNORMAL ELECTROCARDIOGRAM (ECG) (EKG): ICD-10-CM

## 2018-02-13 LAB
LV EJECT FRACT  99904: 60
LV EJECT FRACT MOD 2C 99903: 64.79
LV EJECT FRACT MOD 4C 99902: 65.69
LV EJECT FRACT MOD BP 99901: 64.22

## 2018-02-13 PROCEDURE — 93306 TTE W/DOPPLER COMPLETE: CPT

## 2018-02-17 PROBLEM — I35.8 AORTIC VALVE SCLEROSIS: Status: ACTIVE | Noted: 2018-02-17

## 2018-02-17 PROBLEM — Q21.12 PFO (PATENT FORAMEN OVALE): Status: ACTIVE | Noted: 2018-02-17

## 2018-03-05 ENCOUNTER — OFFICE VISIT (OUTPATIENT)
Dept: OTHER | Facility: MEDICAL CENTER | Age: 68
End: 2018-03-05
Payer: COMMERCIAL

## 2018-03-05 VITALS
SYSTOLIC BLOOD PRESSURE: 118 MMHG | OXYGEN SATURATION: 98 % | TEMPERATURE: 97.7 F | DIASTOLIC BLOOD PRESSURE: 72 MMHG | HEART RATE: 78 BPM | RESPIRATION RATE: 14 BRPM

## 2018-03-05 DIAGNOSIS — I25.84 CORONARY ATHEROSCLEROSIS DUE TO CALCIFIED CORONARY LESION: Primary | ICD-10-CM

## 2018-03-05 DIAGNOSIS — Q21.12 PFO (PATENT FORAMEN OVALE): ICD-10-CM

## 2018-03-05 DIAGNOSIS — I25.10 CORONARY ATHEROSCLEROSIS DUE TO CALCIFIED CORONARY LESION: Primary | ICD-10-CM

## 2018-03-05 DIAGNOSIS — I35.8 AORTIC VALVE SCLEROSIS: ICD-10-CM

## 2018-03-05 PROCEDURE — 99214 OFFICE O/P EST MOD 30 MIN: CPT | Performed by: FAMILY MEDICINE

## 2018-03-05 NOTE — PATIENT INSTRUCTIONS
Current Outpatient Prescriptions Ordered in [facility]   Medication Sig Dispense Refill   • irbesartan-hydrochlorothiazide (AVALIDE) 150-12.5 MG per tablet Take 1 Tab by mouth every day. 90 Tab 3   • simvastatin (ZOCOR) 20 MG Tab Take 1 Tab by mouth every day. 90 Tab 4   • escitalopram (LEXAPRO) 10 MG Tab Take 1 Tab by mouth every day. 90 Tab 4   • metformin ER (GLUCOPHAGE XR) 500 MG TABLET SR 24 HR Take 1 Tab by mouth every day. 90 Tab 4   • aspirin EC (ECOTRIN) 81 MG Tablet Delayed Response Take 1 Tab by mouth.     • Cholecalciferol (VITAMIN D) 2000 units Cap Take 1 Cap by mouth every day.     • Multiple Vitamins-Minerals (OCUVITE ADULT 50+) Cap Take 1 Each by mouth.       No current Epic-ordered facility-administered medications on file.

## 2018-04-20 ENCOUNTER — OFFICE VISIT (OUTPATIENT)
Dept: OTHER | Facility: MEDICAL CENTER | Age: 68
End: 2018-04-20
Payer: COMMERCIAL

## 2018-04-20 VITALS — HEART RATE: 72 BPM | RESPIRATION RATE: 16 BRPM

## 2018-04-20 DIAGNOSIS — H93.8X1 EAR FULLNESS, RIGHT: Primary | ICD-10-CM

## 2018-04-20 PROCEDURE — 99213 OFFICE O/P EST LOW 20 MIN: CPT | Performed by: FAMILY MEDICINE

## 2018-04-20 NOTE — PATIENT INSTRUCTIONS
Current Outpatient Prescriptions Ordered in [facility]   Medication Sig Dispense Refill   • irbesartan-hydrochlorothiazide (AVALIDE) 150-12.5 MG per tablet Take 1 Tab by mouth every day. 90 Tab 3   • simvastatin (ZOCOR) 20 MG Tab Take 1 Tab by mouth every day. 90 Tab 4   • escitalopram (LEXAPRO) 10 MG Tab Take 1 Tab by mouth every day. 90 Tab 4   • metformin ER (GLUCOPHAGE XR) 500 MG TABLET SR 24 HR Take 1 Tab by mouth every day. 90 Tab 4   • aspirin EC (ECOTRIN) 81 MG Tablet Delayed Response Take 1 Tab by mouth.     • Cholecalciferol (VITAMIN D) 2000 units Cap Take 1 Cap by mouth every day.     • Multiple Vitamins-Minerals (OCUVITE ADULT 50+) Cap Take 1 Each by mouth.       No current Epic-ordered facility-administered medications on file.

## 2018-04-20 NOTE — PROGRESS NOTES
SUBJECTIVE:    Chief Complaint   Patient presents with   • Ear Fullness       Jose Fallon is a 67 y.o. male,   Established Patient     PROBLEM #1-HISTORY OF PRESENT ILLNESS  New Problem  PATIENT STATEMENT OF PROBLEM - R ear symptoms  ONSET - 1 week  COURSE - initially some mild fullness, and mild rhinorrhea (clear), then some mild pain in past 2 days.   INTENSITY/STATUS/LOCATION/RADIATION - mild / present/ as above  AGGRAVATORS - ?  RELIEVERS - none. Valsalva is not difficult in either ear  TREATMENTS/COMPLIANCE/@GOAL? 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 02/08/2018   • B12 deficiency 02/08/2018   • Lifestyle problems 02/08/2018   • Physical deconditioning 02/08/2018   • Moderately severe multivessel coronary atherosclerosis due to calcified coronary lesions 02/08/2018   • Agatston CAC score 200-399 02/08/2018   • Abnormal resting ECG findings 02/08/2018   • Diverticulosis of colon, per 2/5/16 Colonoscopy 02/07/2018   • Internal and external hemorrhoids without complication, per 2/5/16 Colonoscopy 02/07/2018   • Essential hypertension 01/29/2018   • Dyslipidemia 01/29/2018   • Anxiety 01/29/2018   • Prediabetes 01/29/2018   • Vitamin D deficiency 01/29/2018   • Well adult exam 01/29/2018   • Family history of first degree relative with dementia 01/29/2018   • Family history of colon cancer in father 01/29/2018   • Family history of hypertension 01/29/2018   • Family history of breast cancer in sister 01/29/2018   • Hiatal hernia 01/29/2018   • History of vertigo 01/29/2018   • History of nephritis 01/29/2018   • Osteoarthritis 01/29/2018    • Behind on immunizations 2018   • Hydrocele in adult, left 2018       Outpatient Encounter Prescriptions as of 2018   Medication Sig Dispense Refill   • irbesartan-hydrochlorothiazide (AVALIDE) 150-12.5 MG per tablet Take 1 Tab by mouth every day. 90 Tab 3   • simvastatin (ZOCOR) 20 MG Tab Take 1 Tab by mouth every day. 90 Tab 4   • escitalopram (LEXAPRO) 10 MG Tab Take 1 Tab by mouth every day. 90 Tab 4   • metformin ER (GLUCOPHAGE XR) 500 MG TABLET SR 24 HR Take 1 Tab by mouth every day. 90 Tab 4   • aspirin EC (ECOTRIN) 81 MG Tablet Delayed Response Take 1 Tab by mouth.     • Cholecalciferol (VITAMIN D) 2000 units Cap Take 1 Cap by mouth every day.     • Multiple Vitamins-Minerals (OCUVITE ADULT 50+) Cap Take 1 Each by mouth.       No facility-administered encounter medications on file as of 2018.        Social History   Substance Use Topics   • Smoking status: Former Smoker     Packs/day: 1.00     Years: 2.00     Types: Cigarettes     Start date: 1957     Quit date: 1959   • Smokeless tobacco: Never Used   • Alcohol use 4.2 oz/week     7 Cans of beer per week       Family History   Problem Relation Age of Onset   • Dementia Mother 70      @ 81 years   • Hypertension Father 78      @ 79 years   • Cancer Father 77     Colon Cancer   • Kidney Disease Father 78     Kidney failure was cause of death   • Breast Cancer Sister 50   • Heart Attack Paternal Grandmother 78     Fatal MI   • Heart Attack Paternal Grandfather 60     Fatal MI       Patient's Past, Social, and Family History reviewed and updated by me in EPIC today.    REVIEW OF SYMPTOMS:               Pertinent Positives as above.    All other systems reviewed and negative.     OBJECTIVE:    Pulse 72   Resp 16   There is no height or weight on file to calculate BMI.    Well developed, well nourished male, no acute distress, non-ill appearing. Comfortable, appears stated age, pleasant and cooperative  HEAD:  atraumatic, normocephalic   EYES: Conjunctiva normal, extra-occular movements intact, PERRLA, acuity grossly intact.   EARS/NOSE/THROAT: TM's normal, no signs or symptoms of infection, no perforation, no hemotympanum, acuity grossly intact. Oropharynx: benign, no lesions noted. Nares: benign.   NECK: supple, no adenopathy, no thyromegaly or nodules, no jugular vein distention, no carotid bruits.   CHEST/LUNGS: clear to auscultation and percussion bilaterally. No adventitious breath sounds.   CARDIOVASCULAR: regular rate and rhythm, no murmur. Point of maximum intensity not displaced. Good central and peripheral pulses.   BACK: no CVA tenderness.   ABDOMEN: soft, non-tender, non-distended, no masses, no hepatosplenomegaly. Normal active bowel tones.   : deferred.   Rectal: deferred.   Extremities: warm/well-perfused, no cyanosis, clubbing, or edema.   SKIN: clear, unbroken, no rashes, normal turgor.   Neuro: Mental Status: Alert and Oriented x 3. CN II-XII grossly intact. Gait normal. Non-focal, intact. Normal strength, sensation    ASSESSMENT:    1. Ear fullness, right         PLAN:    Total Face-to-Face time spent with patient: 25 minutes  Amount of time spent counseling patient and/or coordinating care: 15 minutes    The nature of patient counseling as below:  -Patient Education, including below topics:  -Differential Diagnoses and treatment options discussed  -Risks, benefits, alternatives discussed  -Close observation/ OTC medications    The nature of coordination of care as below:  -Medications added: OTC Flonase 2 sprays each nostril twice daily (finish bottle)  -Continue (other) present chronic medications  -Seek medical attention immediately if worse    FOLLOW-UP:  For previously scheduled f/u appt  And as needed.

## 2018-05-07 ENCOUNTER — HOSPITAL ENCOUNTER (OUTPATIENT)
Dept: LAB | Facility: MEDICAL CENTER | Age: 68
End: 2018-05-07
Attending: INTERNAL MEDICINE
Payer: COMMERCIAL

## 2018-05-07 LAB
25(OH)D3 SERPL-MCNC: 33 NG/ML (ref 30–100)
ALBUMIN SERPL BCP-MCNC: 4 G/DL (ref 3.2–4.9)
ALBUMIN/GLOB SERPL: 1.2 G/DL
ALP SERPL-CCNC: 55 U/L (ref 30–99)
ALT SERPL-CCNC: 16 U/L (ref 2–50)
ANION GAP SERPL CALC-SCNC: 7 MMOL/L (ref 0–11.9)
APPEARANCE UR: CLEAR
AST SERPL-CCNC: 22 U/L (ref 12–45)
BASOPHILS # BLD AUTO: 0.8 % (ref 0–1.8)
BASOPHILS # BLD: 0.04 K/UL (ref 0–0.12)
BILIRUB SERPL-MCNC: 0.7 MG/DL (ref 0.1–1.5)
BILIRUB UR QL STRIP.AUTO: NEGATIVE
BUN SERPL-MCNC: 19 MG/DL (ref 8–22)
CALCIUM SERPL-MCNC: 9.1 MG/DL (ref 8.5–10.5)
CHLORIDE SERPL-SCNC: 105 MMOL/L (ref 96–112)
CO2 SERPL-SCNC: 29 MMOL/L (ref 20–33)
COLOR UR: YELLOW
CREAT SERPL-MCNC: 1.09 MG/DL (ref 0.5–1.4)
CREAT UR-MCNC: 165.6 MG/DL
EOSINOPHIL # BLD AUTO: 0.29 K/UL (ref 0–0.51)
EOSINOPHIL NFR BLD: 5.7 % (ref 0–6.9)
ERYTHROCYTE [DISTWIDTH] IN BLOOD BY AUTOMATED COUNT: 42.8 FL (ref 35.9–50)
FERRITIN SERPL-MCNC: 102.1 NG/ML (ref 22–322)
GLOBULIN SER CALC-MCNC: 3.3 G/DL (ref 1.9–3.5)
GLUCOSE SERPL-MCNC: 96 MG/DL (ref 65–99)
GLUCOSE UR STRIP.AUTO-MCNC: NEGATIVE MG/DL
HCT VFR BLD AUTO: 42.1 % (ref 42–52)
HGB BLD-MCNC: 13.4 G/DL (ref 14–18)
IMM GRANULOCYTES # BLD AUTO: 0.01 K/UL (ref 0–0.11)
IMM GRANULOCYTES NFR BLD AUTO: 0.2 % (ref 0–0.9)
IRON SATN MFR SERPL: 25 % (ref 15–55)
IRON SERPL-MCNC: 84 UG/DL (ref 50–180)
KETONES UR STRIP.AUTO-MCNC: NEGATIVE MG/DL
LEUKOCYTE ESTERASE UR QL STRIP.AUTO: NEGATIVE
LYMPHOCYTES # BLD AUTO: 1.34 K/UL (ref 1–4.8)
LYMPHOCYTES NFR BLD: 26.3 % (ref 22–41)
MAGNESIUM SERPL-MCNC: 1.7 MG/DL (ref 1.5–2.5)
MCH RBC QN AUTO: 29.6 PG (ref 27–33)
MCHC RBC AUTO-ENTMCNC: 31.8 G/DL (ref 33.7–35.3)
MCV RBC AUTO: 93.1 FL (ref 81.4–97.8)
MICRO URNS: NORMAL
MONOCYTES # BLD AUTO: 0.36 K/UL (ref 0–0.85)
MONOCYTES NFR BLD AUTO: 7.1 % (ref 0–13.4)
NEUTROPHILS # BLD AUTO: 3.05 K/UL (ref 1.82–7.42)
NEUTROPHILS NFR BLD: 59.9 % (ref 44–72)
NITRITE UR QL STRIP.AUTO: NEGATIVE
NRBC # BLD AUTO: 0 K/UL
NRBC BLD-RTO: 0 /100 WBC
PH UR STRIP.AUTO: 5 [PH]
PLATELET # BLD AUTO: 221 K/UL (ref 164–446)
PMV BLD AUTO: 10.4 FL (ref 9–12.9)
POTASSIUM SERPL-SCNC: 3.9 MMOL/L (ref 3.6–5.5)
PROT SERPL-MCNC: 7.3 G/DL (ref 6–8.2)
PROT UR QL STRIP: NEGATIVE MG/DL
PROT UR-MCNC: 9.8 MG/DL (ref 0–15)
PROT/CREAT UR: 59 MG/G (ref 15–68)
RBC # BLD AUTO: 4.52 M/UL (ref 4.7–6.1)
RBC UR QL AUTO: NEGATIVE
SODIUM SERPL-SCNC: 141 MMOL/L (ref 135–145)
SP GR UR STRIP.AUTO: 1.03
TIBC SERPL-MCNC: 337 UG/DL (ref 250–450)
UROBILINOGEN UR STRIP.AUTO-MCNC: 0.2 MG/DL
WBC # BLD AUTO: 5.1 K/UL (ref 4.8–10.8)

## 2018-05-07 PROCEDURE — 82570 ASSAY OF URINE CREATININE: CPT

## 2018-05-07 PROCEDURE — 82306 VITAMIN D 25 HYDROXY: CPT

## 2018-05-07 PROCEDURE — 80053 COMPREHEN METABOLIC PANEL: CPT

## 2018-05-07 PROCEDURE — 83550 IRON BINDING TEST: CPT

## 2018-05-07 PROCEDURE — 84156 ASSAY OF PROTEIN URINE: CPT

## 2018-05-07 PROCEDURE — 81003 URINALYSIS AUTO W/O SCOPE: CPT

## 2018-05-07 PROCEDURE — 82728 ASSAY OF FERRITIN: CPT

## 2018-05-07 PROCEDURE — 83540 ASSAY OF IRON: CPT

## 2018-05-07 PROCEDURE — 83735 ASSAY OF MAGNESIUM: CPT

## 2018-05-07 PROCEDURE — 36415 COLL VENOUS BLD VENIPUNCTURE: CPT

## 2018-05-07 PROCEDURE — 85025 COMPLETE CBC W/AUTO DIFF WBC: CPT

## 2018-05-09 ENCOUNTER — OFFICE VISIT (OUTPATIENT)
Dept: CARDIOLOGY | Facility: MEDICAL CENTER | Age: 68
End: 2018-05-09
Payer: COMMERCIAL

## 2018-05-09 VITALS
SYSTOLIC BLOOD PRESSURE: 118 MMHG | BODY MASS INDEX: 25.19 KG/M2 | OXYGEN SATURATION: 95 % | WEIGHT: 186 LBS | DIASTOLIC BLOOD PRESSURE: 70 MMHG | HEART RATE: 80 BPM | HEIGHT: 72 IN

## 2018-05-09 DIAGNOSIS — I25.84 CORONARY ARTERY CALCIFICATION: ICD-10-CM

## 2018-05-09 DIAGNOSIS — I10 ESSENTIAL HYPERTENSION: ICD-10-CM

## 2018-05-09 DIAGNOSIS — I25.10 CORONARY ARTERY CALCIFICATION: ICD-10-CM

## 2018-05-09 DIAGNOSIS — E78.5 DYSLIPIDEMIA: ICD-10-CM

## 2018-05-09 PROCEDURE — 99215 OFFICE O/P EST HI 40 MIN: CPT | Performed by: INTERNAL MEDICINE

## 2018-05-09 ASSESSMENT — ENCOUNTER SYMPTOMS
LOSS OF CONSCIOUSNESS: 0
DEPRESSION: 0
FALLS: 0
DIZZINESS: 0
ABDOMINAL PAIN: 0
PND: 0
SHORTNESS OF BREATH: 0
ORTHOPNEA: 0
PALPITATIONS: 0

## 2018-05-09 NOTE — LETTER
"     [facility]   [address]  [place] [address]  Phone: [phone]  Fax: [phone]              Jose Fallon  1950    Encounter Date: 2018    Heike Azul M.D.          PROGRESS NOTE:  Subjective:   Jose Fallon is a 67 y.o. male presented to clinic for follow-up on his coronary calcifications. Patient has been feeling really well. He does not exercise daily but does try to walk 2-3 miles at 15 minutes/mile over the weekend. Denies any symptoms. Also likes to golf.    His blood pressure is usually controlled, mostly 110 to 120s.    For his hyperlipidemia is currently on Zocor but she has been tolerating well.    Previously had 2 episodes of right-sided weakness that was transient. Patient has since been seen by neurology which was thought to be secondary to migraines.     Past Medical History:   Diagnosis Date   • Anxiety     on Lexapro since    • Headache     \"life long\" per pt   • Hyperlipidemia    • Hypertension    • Vertigo      Past Surgical History:   Procedure Laterality Date   • HERNIA REPAIR       Family History   Problem Relation Age of Onset   • Dementia Mother 70      @ 81 years   • Hypertension Father 78      @ 79 years   • Cancer Father 77     Colon Cancer   • Kidney Disease Father 78     Kidney failure was cause of death   • Breast Cancer Sister 50   • Heart Attack Paternal Grandmother 78     Fatal MI   • Heart Attack Paternal Grandfather 60     Fatal MI     History   Smoking Status   • Former Smoker   • Packs/day: 1.00   • Years: 2.00   • Types: Cigarettes   • Start date: 1957   • Quit date: 1959   Smokeless Tobacco   • Never Used     Drinks one beer per night. No recreational drug use.    No Known Allergies  Outpatient Encounter Prescriptions as of 2018   Medication Sig Dispense Refill   • irbesartan-hydrochlorothiazide (AVALIDE) 150-12.5 MG per tablet Take 1 Tab by mouth every day. 90 " "Tab 3   • simvastatin (ZOCOR) 20 MG Tab Take 1 Tab by mouth every day. 90 Tab 4   • escitalopram (LEXAPRO) 10 MG Tab Take 1 Tab by mouth every day. 90 Tab 4   • metformin ER (GLUCOPHAGE XR) 500 MG TABLET SR 24 HR Take 1 Tab by mouth every day. 90 Tab 4   • aspirin EC (ECOTRIN) 81 MG Tablet Delayed Response Take 1 Tab by mouth.     • Cholecalciferol (VITAMIN D) 2000 units Cap Take 1 Cap by mouth every day.     • Multiple Vitamins-Minerals (OCUVITE ADULT 50+) Cap Take 1 Each by mouth.       No facility-administered encounter medications on file as of 5/9/2018.      Review of Systems   Constitutional: Negative for malaise/fatigue.   Respiratory: Negative for shortness of breath.    Cardiovascular: Negative for chest pain, palpitations, orthopnea, leg swelling and PND.   Gastrointestinal: Negative for abdominal pain.   Musculoskeletal: Negative for falls.   Skin: Negative.    Neurological: Negative for dizziness and loss of consciousness.   Psychiatric/Behavioral: Negative for depression.   All other systems reviewed and are negative.       Objective:   /70   Pulse 80   Ht 1.816 m (5' 11.5\")   Wt 84.4 kg (186 lb)   SpO2 95%   BMI 25.58 kg/m²      Physical Exam   Constitutional: He is oriented to person, place, and time. No distress.   HENT:   Head: Normocephalic and atraumatic.   Eyes: Conjunctivae are normal.   Neck: Normal range of motion. Neck supple. No JVD present.   Cardiovascular: Normal rate, regular rhythm and normal heart sounds.  Exam reveals no gallop and no friction rub.    No murmur heard.  Pulmonary/Chest: Effort normal and breath sounds normal. No respiratory distress. He has no wheezes. He has no rales.   Abdominal: Soft. There is no tenderness.   Musculoskeletal: He exhibits no edema.   Neurological: He is alert and oriented to person, place, and time.   Skin: Skin is warm and dry. He is not diaphoretic.   Psychiatric: He has a normal mood and affect.   Nursing note and vitals " reviewed.    Exercise treadmill test performed in February 2018. Tracings were personally reviewed. Baseline EKG shows normal sinus rhythm. Q waves in V1 through V3. Upsloping ST elevation and J-point elevation seen in the V2.  Patient exercised 2 minutes and 43 seconds on the Nain protocol. Test was reportedly stopped for target heart rate achieved. Patient denies having any symptoms. No ischemic ST-T wave changes were seen. 93% of maximum predicted target heart rate was achieved.    CT coronary calcium scoring performed in January 2018. Report was reviewed and showed a total coronary calcium score of 377.      Echocardiogram performed in February 2018. Images were personally reviewed and showed normal LV systolic function. Normal diastolic function. Right to left shunt seen on agitated saline contrast study suggestive of an intracardiac shunt.    Labs performed in May 2018 was reviewed and showed hemoglobin 13.4. Creatinine 1.0.      Assessment:     1. Right to left cardiac shunt (HCC)     2. Essential hypertension     3. Dyslipidemia     4. Coronary artery calcification         Medical Decision Making:  Today's Assessment / Status / Plan:     Elevated coronary calcium score: Patient has an asymptomatic. Increase exercise as tolerated. He can also consider measuring his steps per day. Continue aspirin and statin. Should the patient start having anginal symptoms, he will let us know.    Right to left shunt: Noted on echocardiogram, suggestive of intracardiac shunt likely PFO. Patient has been evaluated by neurology and he has not had TIAs. No indication for PFO closure device.    Hypertension: Blood pressure continues to be at goal. Continue hydrochlorothiazide and irbesartan at current dose. His renal function has been stable.    Hyperlipidemia: Last LDL 76 in January. Continue Zocor at current dose.    Return to clinic in 1 year or earlier if needed.    Thank you for allowing me to participate in the care of  this patient. Please do not hesitate to contact me with any questions.    Heike Azul MD  Cardiologist  [facility]    PLEASE NOTE: This dictation was created using voice recognition software.           Pablito Kessler M.D.  [id number] [address]  VIA In Basket

## 2018-05-09 NOTE — PROGRESS NOTES
"Subjective:   Jose Fallon is a 67 y.o. male presented to clinic for follow-up on his coronary calcifications. Patient has been feeling really well. He does not exercise daily but does try to walk 2-3 miles at 15 minutes/mile over the weekend. Denies any symptoms. Also likes to golf.    His blood pressure is usually controlled, mostly 110 to 120s.    For his hyperlipidemia is currently on Zocor but she has been tolerating well.    Previously had 2 episodes of right-sided weakness that was transient. Patient has since been seen by neurology which was thought to be secondary to migraines.     Past Medical History:   Diagnosis Date   • Anxiety     on Lexapro since    • Headache     \"life long\" per pt   • Hyperlipidemia    • Hypertension    • Vertigo      Past Surgical History:   Procedure Laterality Date   • HERNIA REPAIR       Family History   Problem Relation Age of Onset   • Dementia Mother 70      @ 81 years   • Hypertension Father 78      @ 79 years   • Cancer Father 77     Colon Cancer   • Kidney Disease Father 78     Kidney failure was cause of death   • Breast Cancer Sister 50   • Heart Attack Paternal Grandmother 78     Fatal MI   • Heart Attack Paternal Grandfather 60     Fatal MI     History   Smoking Status   • Former Smoker   • Packs/day: 1.00   • Years: 2.00   • Types: Cigarettes   • Start date: 1957   • Quit date: 1959   Smokeless Tobacco   • Never Used     Drinks one beer per night. No recreational drug use.    No Known Allergies  Outpatient Encounter Prescriptions as of 2018   Medication Sig Dispense Refill   • irbesartan-hydrochlorothiazide (AVALIDE) 150-12.5 MG per tablet Take 1 Tab by mouth every day. 90 Tab 3   • simvastatin (ZOCOR) 20 MG Tab Take 1 Tab by mouth every day. 90 Tab 4   • escitalopram (LEXAPRO) 10 MG Tab Take 1 Tab by mouth every day. 90 Tab 4   • metformin ER (GLUCOPHAGE XR) 500 MG TABLET SR 24 HR Take 1 Tab by mouth every day. 90 Tab 4   • " "aspirin EC (ECOTRIN) 81 MG Tablet Delayed Response Take 1 Tab by mouth.     • Cholecalciferol (VITAMIN D) 2000 units Cap Take 1 Cap by mouth every day.     • Multiple Vitamins-Minerals (OCUVITE ADULT 50+) Cap Take 1 Each by mouth.       No facility-administered encounter medications on file as of 5/9/2018.      Review of Systems   Constitutional: Negative for malaise/fatigue.   Respiratory: Negative for shortness of breath.    Cardiovascular: Negative for chest pain, palpitations, orthopnea, leg swelling and PND.   Gastrointestinal: Negative for abdominal pain.   Musculoskeletal: Negative for falls.   Skin: Negative.    Neurological: Negative for dizziness and loss of consciousness.   Psychiatric/Behavioral: Negative for depression.   All other systems reviewed and are negative.       Objective:   /70   Pulse 80   Ht 1.816 m (5' 11.5\")   Wt 84.4 kg (186 lb)   SpO2 95%   BMI 25.58 kg/m²     Physical Exam   Constitutional: He is oriented to person, place, and time. No distress.   HENT:   Head: Normocephalic and atraumatic.   Eyes: Conjunctivae are normal.   Neck: Normal range of motion. Neck supple. No JVD present.   Cardiovascular: Normal rate, regular rhythm and normal heart sounds.  Exam reveals no gallop and no friction rub.    No murmur heard.  Pulmonary/Chest: Effort normal and breath sounds normal. No respiratory distress. He has no wheezes. He has no rales.   Abdominal: Soft. There is no tenderness.   Musculoskeletal: He exhibits no edema.   Neurological: He is alert and oriented to person, place, and time.   Skin: Skin is warm and dry. He is not diaphoretic.   Psychiatric: He has a normal mood and affect.   Nursing note and vitals reviewed.    Exercise treadmill test performed in February 2018. Tracings were personally reviewed. Baseline EKG shows normal sinus rhythm. Q waves in V1 through V3. Upsloping ST elevation and J-point elevation seen in the V2.  Patient exercised 2 minutes and 43 seconds " on the Nain protocol. Test was reportedly stopped for target heart rate achieved. Patient denies having any symptoms. No ischemic ST-T wave changes were seen. 93% of maximum predicted target heart rate was achieved.    CT coronary calcium scoring performed in January 2018. Report was reviewed and showed a total coronary calcium score of 377.      Echocardiogram performed in February 2018. Images were personally reviewed and showed normal LV systolic function. Normal diastolic function. Right to left shunt seen on agitated saline contrast study suggestive of an intracardiac shunt.    Labs performed in May 2018 was reviewed and showed hemoglobin 13.4. Creatinine 1.0.      Assessment:     1. Right to left cardiac shunt (HCC)     2. Essential hypertension     3. Dyslipidemia     4. Coronary artery calcification         Medical Decision Making:  Today's Assessment / Status / Plan:     Elevated coronary calcium score: Patient has an asymptomatic. Increase exercise as tolerated. He can also consider measuring his steps per day. Continue aspirin and statin. Should the patient start having anginal symptoms, he will let us know.    Right to left shunt: Noted on echocardiogram, suggestive of intracardiac shunt likely PFO. Patient has been evaluated by neurology and he has not had TIAs. No indication for PFO closure device.    Hypertension: Blood pressure continues to be at goal. Continue hydrochlorothiazide and irbesartan at current dose. His renal function has been stable.    Hyperlipidemia: Last LDL 76 in January. Continue Zocor at current dose.    Return to clinic in 1 year or earlier if needed.    Thank you for allowing me to participate in the care of this patient. Please do not hesitate to contact me with any questions.    Heike Azul MD  Cardiologist  [facility]    PLEASE NOTE: This dictation was created using voice recognition software.

## 2018-06-11 ENCOUNTER — OFFICE VISIT (OUTPATIENT)
Dept: OTHER | Facility: MEDICAL CENTER | Age: 68
End: 2018-06-11
Payer: COMMERCIAL

## 2018-06-11 VITALS
OXYGEN SATURATION: 96 % | SYSTOLIC BLOOD PRESSURE: 116 MMHG | RESPIRATION RATE: 12 BRPM | DIASTOLIC BLOOD PRESSURE: 70 MMHG | HEART RATE: 74 BPM | BODY MASS INDEX: 25.65 KG/M2 | HEIGHT: 72 IN | TEMPERATURE: 99 F | WEIGHT: 189.4 LBS

## 2018-06-11 DIAGNOSIS — R94.31 ABNORMAL RESTING ECG FINDINGS: ICD-10-CM

## 2018-06-11 DIAGNOSIS — R73.09 ELEVATED HEMOGLOBIN A1C: ICD-10-CM

## 2018-06-11 DIAGNOSIS — I25.84 CORONARY ATHEROSCLEROSIS DUE TO CALCIFIED CORONARY LESION: Primary | ICD-10-CM

## 2018-06-11 DIAGNOSIS — I35.8 AORTIC VALVE SCLEROSIS: ICD-10-CM

## 2018-06-11 DIAGNOSIS — E78.5 DYSLIPIDEMIA: ICD-10-CM

## 2018-06-11 DIAGNOSIS — I65.22 CAROTID ATHEROSCLEROSIS, LEFT: ICD-10-CM

## 2018-06-11 DIAGNOSIS — R73.03 PREDIABETES: ICD-10-CM

## 2018-06-11 DIAGNOSIS — I10 ESSENTIAL HYPERTENSION: ICD-10-CM

## 2018-06-11 DIAGNOSIS — R93.1 AGATSTON CAC SCORE 200-399: ICD-10-CM

## 2018-06-11 DIAGNOSIS — I25.10 CORONARY ATHEROSCLEROSIS DUE TO CALCIFIED CORONARY LESION: Primary | ICD-10-CM

## 2018-06-11 DIAGNOSIS — Q21.12 PFO (PATENT FORAMEN OVALE): ICD-10-CM

## 2018-06-11 PROCEDURE — 99214 OFFICE O/P EST MOD 30 MIN: CPT | Performed by: FAMILY MEDICINE

## 2018-06-11 NOTE — PATIENT INSTRUCTIONS
Current Outpatient Prescriptions Ordered in [facility]   Medication Sig Dispense Refill   • irbesartan-hydrochlorothiazide (AVALIDE) 150-12.5 MG per tablet Take 1 Tab by mouth every day. 90 Tab 3   • simvastatin (ZOCOR) 20 MG Tab Take 1 Tab by mouth every day. 90 Tab 4   • escitalopram (LEXAPRO) 10 MG Tab Take 1 Tab by mouth every day. 90 Tab 4   • metformin ER (GLUCOPHAGE XR) 500 MG TABLET SR 24 HR Take 1 Tab by mouth every day. 90 Tab 4   • aspirin EC (ECOTRIN) 81 MG Tablet Delayed Response Take 1 Tab by mouth.     • Cholecalciferol (VITAMIN D) 2000 units Cap Take 1 Cap by mouth every day.     • Multiple Vitamins-Minerals (OCUVITE ADULT 50+) Cap Take 1 Each by mouth.       No current Epic-ordered facility-administered medications on file.

## 2018-06-11 NOTE — PROGRESS NOTES
SUBJECTIVE:    Chief Complaint   Patient presents with   • Coronary Artery Disease     Subclinical       Jose Fallon is a 68 y.o. male,   Established Patient     PROBLEM #1-HISTORY OF PRESENT ILLNESS  Existing Problem, but requiring re-evaluation  PATIENT STATEMENT OF PROBLEM - Cardiometabolic related issues  ONSET - year+  COURSE - he is exercising more, although stress increased in past 3 weeks.  He hopes to cut down at work.  See recent CARD notes; no indication to close PFO.  He is tolerating meds fine.   INTENSITY/STATUS/LOCATION/RADIATION - variable/ stable  AGGRAVATORS - physical deconditioning  RELIEVERS - medications, some Therapeutic Lifestyle Changes   TREATMENTS/COMPLIANCE/@GOAL? 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 02/08/2018   • B12 deficiency 02/08/2018   • Lifestyle problems 02/08/2018   • Physical deconditioning 02/08/2018   • Moderately severe multivessel coronary atherosclerosis due to calcified coronary lesions 02/08/2018   • Agatston CAC score 200-399 02/08/2018   • Abnormal resting ECG findings 02/08/2018   • Diverticulosis of colon, per 2/5/16 Colonoscopy 02/07/2018   • Internal and external hemorrhoids without complication, per 2/5/16 Colonoscopy 02/07/2018   • Essential hypertension 01/29/2018   • Dyslipidemia 01/29/2018   • Anxiety 01/29/2018   • Prediabetes 01/29/2018   • Vitamin D deficiency 01/29/2018   • Well adult exam 01/29/2018   • Family history of first degree relative with dementia 01/29/2018   • Family history of colon cancer in father 01/29/2018   • Family  history of hypertension 2018   • Family history of breast cancer in sister 2018   • Hiatal hernia 2018   • History of vertigo 2018   • History of nephritis 2018   • Osteoarthritis 2018   • Behind on immunizations 2018   • Hydrocele in adult, left 2018       Outpatient Encounter Prescriptions as of 2018   Medication Sig Dispense Refill   • irbesartan-hydrochlorothiazide (AVALIDE) 150-12.5 MG per tablet Take 1 Tab by mouth every day. 90 Tab 3   • simvastatin (ZOCOR) 20 MG Tab Take 1 Tab by mouth every day. 90 Tab 4   • escitalopram (LEXAPRO) 10 MG Tab Take 1 Tab by mouth every day. 90 Tab 4   • metformin ER (GLUCOPHAGE XR) 500 MG TABLET SR 24 HR Take 1 Tab by mouth every day. 90 Tab 4   • aspirin EC (ECOTRIN) 81 MG Tablet Delayed Response Take 1 Tab by mouth.     • Cholecalciferol (VITAMIN D) 2000 units Cap Take 1 Cap by mouth every day.     • Multiple Vitamins-Minerals (OCUVITE ADULT 50+) Cap Take 1 Each by mouth.       No facility-administered encounter medications on file as of 2018.        Social History   Substance Use Topics   • Smoking status: Former Smoker     Packs/day: 1.00     Years: 2.00     Types: Cigarettes     Start date: 1957     Quit date: 1959   • Smokeless tobacco: Never Used   • Alcohol use 4.2 oz/week     7 Cans of beer per week       Family History   Problem Relation Age of Onset   • Dementia Mother 70      @ 81 years   • Hypertension Father 78      @ 79 years   • Cancer Father 77     Colon Cancer   • Kidney Disease Father 78     Kidney failure was cause of death   • Breast Cancer Sister 50   • Heart Attack Paternal Grandmother 78     Fatal MI   • Heart Attack Paternal Grandfather 60     Fatal MI       Patient's Past, Social, and Family History reviewed and updated by me in EPIC today.    REVIEW OF SYMPTOMS:               Pertinent Positives as above.    All other systems reviewed and negative.     OBJECTIVE:    BP  "116/70   Pulse 74   Temp 37.2 °C (99 °F)   Resp 12   Ht 1.816 m (5' 11.5\")   Wt 85.9 kg (189 lb 6.4 oz)   SpO2 96%   BMI 26.05 kg/m²   Body mass index is 26.05 kg/m².    Well developed, well nourished male, no acute distress, non-ill appearing. Comfortable, appears stated age, pleasant and cooperative  HEAD: atraumatic, normocephalic   EYES: Conjunctiva normal, extra-occular movements intact, PERRLA, acuity grossly intact.   EARS/NOSE/THROAT: TM's normal, no signs or symptoms of infection, no perforation, no hemotympanum, acuity grossly intact. Oropharynx: benign, no lesions noted. Nares: benign.   NECK: supple, no adenopathy, no thyromegaly or nodules, no jugular vein distention, no carotid bruits.   CHEST/LUNGS: clear to auscultation and percussion bilaterally. No adventitious breath sounds.   CARDIOVASCULAR: regular rate and rhythm, no murmur. Point of maximum intensity not displaced. Good central and peripheral pulses.   BACK: no CVA tenderness.   ABDOMEN: soft, non-tender, non-distended, no masses, no hepatosplenomegaly. Normal active bowel tones.   : deferred.   Rectal: deferred.   Extremities: warm/well-perfused, no cyanosis, clubbing, or edema.   SKIN: clear, unbroken, no rashes, normal turgor.   Neuro: Mental Status: Alert and Oriented x 3. CN II-XII grossly intact. Gait normal. Non-focal, intact. Normal strength, sensation    ASSESSMENT:    1. Moderately severe multivessel coronary atherosclerosis due to calcified coronary lesions  ECHO-REST/STRESS W/O CONTRAST   2. Essential hypertension     3. Dyslipidemia     4. Prediabetes     5. Elevated hemoglobin A1c     6. Carotid atherosclerosis, left, mild     7. Agatston CAC score 200-399     8. Abnormal resting ECG findings  ECHO-REST/STRESS W/O CONTRAST   9. Echocardiogram findings suggestive of PFO (patent foramen ovale)  ECHO-REST/STRESS W/O CONTRAST   10. Aortic valve sclerosis without stenosis         PLAN:    Total Face-to-Face time spent with " patient: 30 minutes  Amount of time spent counseling patient and/or coordinating care: 20 minutes    The nature of patient counseling as below:  -Patient Education, including below topics:  -Differential Diagnoses and treatment options discussed  -Risks, benefits, alternatives discussed  -Labs reviewed with patient in detail  -CARD notes & pertinent imaging reviewed with patient in detail  -Health Maintenance Exam issues discussed  -Exercise  -Dietary recommendations discussed  -Weight Loss strategies discussed  -Therapeutic Lifestyle Changes discussed    The nature of coordination of care as below:  -Continue (other) present chronic medications  -Blood Pressure Diary  -Labs: in 6 months (orders completed, including 2019 Exec H&P orders)  -Referrals: none  -Other: Stress ECHO in 6-8 months  -Seek medical attention immediately if worse    FOLLOW-UP:  -in early 2019 for f/u and Exec H&P  -and sooner if test/consult results warrant  And for Health Care Maintenance Exams  And as needed.

## 2018-06-28 DIAGNOSIS — E78.5 DYSLIPIDEMIA: ICD-10-CM

## 2018-06-28 RX ORDER — SIMVASTATIN 20 MG
20 TABLET ORAL DAILY
Qty: 90 TAB | Refills: 4 | Status: SHIPPED | OUTPATIENT
Start: 2018-06-28 | End: 2019-09-23 | Stop reason: SDUPTHER

## 2018-08-23 DIAGNOSIS — I10 ESSENTIAL HYPERTENSION: ICD-10-CM

## 2018-08-23 RX ORDER — IRBESARTAN AND HYDROCHLOROTHIAZIDE 150; 12.5 MG/1; MG/1
1 TABLET, FILM COATED ORAL DAILY
Qty: 90 TAB | Refills: 3 | Status: SHIPPED | OUTPATIENT
Start: 2018-08-23 | End: 2019-08-12 | Stop reason: SDUPTHER

## 2018-09-10 DIAGNOSIS — F41.9 ANXIETY: ICD-10-CM

## 2018-09-10 RX ORDER — ESCITALOPRAM OXALATE 10 MG/1
10 TABLET ORAL DAILY
Qty: 90 TAB | Refills: 4 | Status: SHIPPED | OUTPATIENT
Start: 2018-09-10 | End: 2019-11-06

## 2018-10-09 ENCOUNTER — NON-PROVIDER VISIT (OUTPATIENT)
Dept: OTHER | Facility: MEDICAL CENTER | Age: 68
End: 2018-10-09
Payer: COMMERCIAL

## 2018-10-09 DIAGNOSIS — Z23 NEED FOR INFLUENZA VACCINATION: ICD-10-CM

## 2018-10-09 PROCEDURE — 90471 IMMUNIZATION ADMIN: CPT | Performed by: FAMILY MEDICINE

## 2018-10-09 PROCEDURE — 90662 IIV NO PRSV INCREASED AG IM: CPT | Performed by: FAMILY MEDICINE

## 2018-10-10 DIAGNOSIS — Z00.00 WELL ADULT EXAM: ICD-10-CM

## 2018-11-12 DIAGNOSIS — R73.03 PREDIABETES: ICD-10-CM

## 2018-11-12 RX ORDER — METFORMIN HYDROCHLORIDE 500 MG/1
500 TABLET, EXTENDED RELEASE ORAL DAILY
Qty: 90 TAB | Refills: 4 | Status: SHIPPED | OUTPATIENT
Start: 2018-11-12 | End: 2020-02-11 | Stop reason: SDUPTHER

## 2018-11-16 ENCOUNTER — HOSPITAL ENCOUNTER (OUTPATIENT)
Dept: LAB | Facility: MEDICAL CENTER | Age: 68
End: 2018-11-16
Attending: INTERNAL MEDICINE
Payer: COMMERCIAL

## 2018-11-16 LAB
ALBUMIN SERPL BCP-MCNC: 3.9 G/DL (ref 3.2–4.9)
APPEARANCE UR: CLEAR
BASOPHILS # BLD AUTO: 0.7 % (ref 0–1.8)
BASOPHILS # BLD: 0.04 K/UL (ref 0–0.12)
BILIRUB UR QL STRIP.AUTO: NEGATIVE
BUN SERPL-MCNC: 29 MG/DL (ref 8–22)
CALCIUM SERPL-MCNC: 9.3 MG/DL (ref 8.5–10.5)
CHLORIDE SERPL-SCNC: 108 MMOL/L (ref 96–112)
CO2 SERPL-SCNC: 29 MMOL/L (ref 20–33)
COLOR UR: YELLOW
CREAT SERPL-MCNC: 1.15 MG/DL (ref 0.5–1.4)
CREAT UR-MCNC: 184 MG/DL
EOSINOPHIL # BLD AUTO: 0.42 K/UL (ref 0–0.51)
EOSINOPHIL NFR BLD: 7.6 % (ref 0–6.9)
ERYTHROCYTE [DISTWIDTH] IN BLOOD BY AUTOMATED COUNT: 42 FL (ref 35.9–50)
FERRITIN SERPL-MCNC: 98.8 NG/ML (ref 22–322)
GLUCOSE SERPL-MCNC: 97 MG/DL (ref 65–99)
GLUCOSE UR STRIP.AUTO-MCNC: NEGATIVE MG/DL
HCT VFR BLD AUTO: 41.9 % (ref 42–52)
HGB BLD-MCNC: 13.3 G/DL (ref 14–18)
IMM GRANULOCYTES # BLD AUTO: 0.02 K/UL (ref 0–0.11)
IMM GRANULOCYTES NFR BLD AUTO: 0.4 % (ref 0–0.9)
IRON SATN MFR SERPL: 19 % (ref 15–55)
IRON SERPL-MCNC: 65 UG/DL (ref 50–180)
KETONES UR STRIP.AUTO-MCNC: NEGATIVE MG/DL
LEUKOCYTE ESTERASE UR QL STRIP.AUTO: NEGATIVE
LYMPHOCYTES # BLD AUTO: 1.42 K/UL (ref 1–4.8)
LYMPHOCYTES NFR BLD: 25.6 % (ref 22–41)
MCH RBC QN AUTO: 29.3 PG (ref 27–33)
MCHC RBC AUTO-ENTMCNC: 31.7 G/DL (ref 33.7–35.3)
MCV RBC AUTO: 92.3 FL (ref 81.4–97.8)
MICRO URNS: NORMAL
MONOCYTES # BLD AUTO: 0.44 K/UL (ref 0–0.85)
MONOCYTES NFR BLD AUTO: 7.9 % (ref 0–13.4)
NEUTROPHILS # BLD AUTO: 3.21 K/UL (ref 1.82–7.42)
NEUTROPHILS NFR BLD: 57.8 % (ref 44–72)
NITRITE UR QL STRIP.AUTO: NEGATIVE
NRBC # BLD AUTO: 0 K/UL
NRBC BLD-RTO: 0 /100 WBC
PH UR STRIP.AUTO: 5.5 [PH]
PHOSPHATE SERPL-MCNC: 3.7 MG/DL (ref 2.5–4.5)
PLATELET # BLD AUTO: 229 K/UL (ref 164–446)
PMV BLD AUTO: 10.7 FL (ref 9–12.9)
POTASSIUM SERPL-SCNC: 3.8 MMOL/L (ref 3.6–5.5)
PROT UR QL STRIP: NEGATIVE MG/DL
PROT UR-MCNC: 9.3 MG/DL (ref 0–15)
PROT/CREAT UR: 51 MG/G (ref 15–68)
RBC # BLD AUTO: 4.54 M/UL (ref 4.7–6.1)
RBC UR QL AUTO: NEGATIVE
SODIUM SERPL-SCNC: 144 MMOL/L (ref 135–145)
SP GR UR STRIP.AUTO: 1.03
TIBC SERPL-MCNC: 336 UG/DL (ref 250–450)
URATE SERPL-MCNC: 5.7 MG/DL (ref 2.5–8.3)
UROBILINOGEN UR STRIP.AUTO-MCNC: 0.2 MG/DL
WBC # BLD AUTO: 5.6 K/UL (ref 4.8–10.8)

## 2018-11-16 PROCEDURE — 82728 ASSAY OF FERRITIN: CPT

## 2018-11-16 PROCEDURE — 84156 ASSAY OF PROTEIN URINE: CPT

## 2018-11-16 PROCEDURE — 83540 ASSAY OF IRON: CPT

## 2018-11-16 PROCEDURE — 85025 COMPLETE CBC W/AUTO DIFF WBC: CPT

## 2018-11-16 PROCEDURE — 84550 ASSAY OF BLOOD/URIC ACID: CPT

## 2018-11-16 PROCEDURE — 81003 URINALYSIS AUTO W/O SCOPE: CPT

## 2018-11-16 PROCEDURE — 83550 IRON BINDING TEST: CPT

## 2018-11-16 PROCEDURE — 80069 RENAL FUNCTION PANEL: CPT

## 2018-11-16 PROCEDURE — 82570 ASSAY OF URINE CREATININE: CPT

## 2018-11-16 PROCEDURE — 36415 COLL VENOUS BLD VENIPUNCTURE: CPT

## 2019-01-14 ENCOUNTER — NON-PROVIDER VISIT (OUTPATIENT)
Dept: OTHER | Facility: MEDICAL CENTER | Age: 69
End: 2019-01-14
Payer: COMMERCIAL

## 2019-01-14 ENCOUNTER — HOSPITAL ENCOUNTER (OUTPATIENT)
Dept: RADIOLOGY | Facility: MEDICAL CENTER | Age: 69
End: 2019-01-14
Attending: FAMILY MEDICINE

## 2019-01-14 ENCOUNTER — HOSPITAL ENCOUNTER (OUTPATIENT)
Dept: RADIOLOGY | Facility: MEDICAL CENTER | Age: 69
End: 2019-01-14
Attending: FAMILY MEDICINE
Payer: COMMERCIAL

## 2019-01-14 ENCOUNTER — HOSPITAL ENCOUNTER (OUTPATIENT)
Facility: MEDICAL CENTER | Age: 69
End: 2019-01-14
Attending: FAMILY MEDICINE
Payer: COMMERCIAL

## 2019-01-14 DIAGNOSIS — Z00.00 WELL ADULT EXAM: ICD-10-CM

## 2019-01-14 LAB
APPEARANCE UR: CLEAR
BILIRUB UR QL STRIP.AUTO: NEGATIVE
COLOR UR: YELLOW
CREAT UR-MCNC: 253.6 MG/DL
GLUCOSE UR STRIP.AUTO-MCNC: NEGATIVE MG/DL
HCV AB SER QL: NEGATIVE
KETONES UR STRIP.AUTO-MCNC: NEGATIVE MG/DL
LEUKOCYTE ESTERASE UR QL STRIP.AUTO: NEGATIVE
MICRO URNS: NORMAL
MICROALBUMIN UR-MCNC: <0.7 MG/DL
MICROALBUMIN/CREAT UR: NORMAL MG/G (ref 0–30)
NITRITE UR QL STRIP.AUTO: NEGATIVE
PH UR STRIP.AUTO: 5 [PH]
PROT UR QL STRIP: NEGATIVE MG/DL
RBC UR QL AUTO: NEGATIVE
SP GR UR STRIP.AUTO: 1.03
UROBILINOGEN UR STRIP.AUTO-MCNC: 0.2 MG/DL

## 2019-01-14 PROCEDURE — 77080 DXA BONE DENSITY AXIAL: CPT

## 2019-01-14 PROCEDURE — 306734 US-TOTAL VASCULAR SCREENING (S/P)

## 2019-01-14 PROCEDURE — 0126T US-CIMT DUPLEX: CPT

## 2019-01-14 PROCEDURE — 71046 X-RAY EXAM CHEST 2 VIEWS: CPT

## 2019-01-14 PROCEDURE — 76700 US EXAM ABDOM COMPLETE: CPT

## 2019-01-24 ENCOUNTER — HOSPITAL ENCOUNTER (OUTPATIENT)
Dept: CARDIOLOGY | Facility: MEDICAL CENTER | Age: 69
End: 2019-01-24
Attending: FAMILY MEDICINE
Payer: COMMERCIAL

## 2019-01-24 DIAGNOSIS — R94.31 ABNORMAL RESTING ECG FINDINGS: ICD-10-CM

## 2019-01-24 DIAGNOSIS — Q21.12 PFO (PATENT FORAMEN OVALE): ICD-10-CM

## 2019-01-24 DIAGNOSIS — I25.84 CORONARY ATHEROSCLEROSIS DUE TO CALCIFIED CORONARY LESION: ICD-10-CM

## 2019-01-24 DIAGNOSIS — I25.10 CORONARY ATHEROSCLEROSIS DUE TO CALCIFIED CORONARY LESION: ICD-10-CM

## 2019-01-24 LAB — LV EJECT FRACT  99904: 55

## 2019-01-24 PROCEDURE — 93018 CV STRESS TEST I&R ONLY: CPT | Performed by: INTERNAL MEDICINE

## 2019-01-24 PROCEDURE — 700117 HCHG RX CONTRAST REV CODE 255: Performed by: FAMILY MEDICINE

## 2019-01-24 PROCEDURE — 93017 CV STRESS TEST TRACING ONLY: CPT

## 2019-01-24 PROCEDURE — 93350 STRESS TTE ONLY: CPT | Mod: 26 | Performed by: INTERNAL MEDICINE

## 2019-01-24 RX ADMIN — HUMAN ALBUMIN MICROSPHERES AND PERFLUTREN 3 ML: 10; .22 INJECTION, SOLUTION INTRAVENOUS at 09:00

## 2019-02-07 ENCOUNTER — NON-PROVIDER VISIT (OUTPATIENT)
Dept: OTHER | Facility: MEDICAL CENTER | Age: 69
End: 2019-02-07
Attending: FAMILY MEDICINE
Payer: COMMERCIAL

## 2019-02-07 ENCOUNTER — OFFICE VISIT (OUTPATIENT)
Dept: OTHER | Facility: MEDICAL CENTER | Age: 69
End: 2019-02-07
Attending: FAMILY MEDICINE

## 2019-02-07 VITALS
HEIGHT: 72 IN | SYSTOLIC BLOOD PRESSURE: 106 MMHG | DIASTOLIC BLOOD PRESSURE: 68 MMHG | TEMPERATURE: 98.5 F | RESPIRATION RATE: 12 BRPM | WEIGHT: 185.4 LBS | BODY MASS INDEX: 25.11 KG/M2 | HEART RATE: 74 BPM | OXYGEN SATURATION: 99 %

## 2019-02-07 DIAGNOSIS — E88.819 INSULIN RESISTANCE: ICD-10-CM

## 2019-02-07 DIAGNOSIS — I10 ESSENTIAL HYPERTENSION: ICD-10-CM

## 2019-02-07 DIAGNOSIS — Z23 NEED FOR SHINGLES VACCINE: ICD-10-CM

## 2019-02-07 DIAGNOSIS — Z00.00 WELL ADULT EXAM: Primary | ICD-10-CM

## 2019-02-07 DIAGNOSIS — R93.2 HIGHLY ECHOGENIC LIVER ON ULTRASOUND: ICD-10-CM

## 2019-02-07 DIAGNOSIS — Z28.39 BEHIND ON IMMUNIZATIONS: ICD-10-CM

## 2019-02-07 DIAGNOSIS — R79.82 CRP ELEVATED: ICD-10-CM

## 2019-02-07 DIAGNOSIS — Z23 NEED FOR PNEUMOCOCCAL VACCINATION: ICD-10-CM

## 2019-02-07 DIAGNOSIS — E88.810 METABOLIC SYNDROME: ICD-10-CM

## 2019-02-07 DIAGNOSIS — R93.1 AGATSTON CAC SCORE 200-399: ICD-10-CM

## 2019-02-07 DIAGNOSIS — I65.23 CAROTID ATHEROSCLEROSIS, BILATERAL: ICD-10-CM

## 2019-02-07 DIAGNOSIS — G47.34 NOCTURNAL HYPOXIA: ICD-10-CM

## 2019-02-07 DIAGNOSIS — R73.09 ELEVATED HEMOGLOBIN A1C: ICD-10-CM

## 2019-02-07 DIAGNOSIS — E78.2 MIXED HYPERLIPIDEMIA WITH APOLIPOPROTEIN E4 VARIANT: ICD-10-CM

## 2019-02-07 PROCEDURE — 90670 PCV13 VACCINE IM: CPT | Performed by: FAMILY MEDICINE

## 2019-02-07 PROCEDURE — 90471 IMMUNIZATION ADMIN: CPT | Performed by: FAMILY MEDICINE

## 2019-02-07 NOTE — PROGRESS NOTES
"[facility]    EXECUTIVE HISTORY AND PHYSICAL  Performed by Dr. Pablito Keslser    SUBJECTIVE:    Chief Complaint   Patient presents with   • Executive Physical   • Other     Increased waist circumference; BMI: 25.4   • Hyperlipidemia     ApoE4   • Abnormal Labs     Elevated A1c   • Aortic Atherosclerosis     & Right Carotid Atherosclerosis   • Coronary Artery Disease     Per CTCS of 377.7 in 2017   • Other     Mildly abnormal OPO   • Immunizations     Prevnar given today; Rx given for Shingrix   • Other     Advanced Directives discussed, dispensed       Jose Fallon is a 68 y.o. male,   Established Patient @ [facility]    Preventive medicine issues discussed:  abuse, aspirin, dental, Alcohol, Tobacco, HIV/AIDS, injuries, mental health/depression, nutrition, exercise, occupational health, sexual behavior, UV exposure, advanced directives, Cancer Screening. Vaccines.      PROBLEM #1-HISTORY OF PRESENT ILLNESS  PATIENT STATEMENT OF PROBLEM - Cardio-metabolic related issues  ONSET - discussed again today   COURSE - Asymptomatic. No ASCVD event history. Known CAD per CTCS of 377.7 in 2017. Increased waist circumference (41\"), and BMI: 25.4. Currently taking glucophage, avalide, simvastatin, aspirin. Current imaging/non-lab testing: echogenic liver, mild abdominal aorta and right carotid atherosclerosis, reassuring cIMT of 60 vs 69, negative/normal stress echo, mildly abnormal OPO. Current pertinent labs:    HIGH & INT RISK: LDL-P/ApoA1/Sterol-hyperabsorption/A1c(5.9)/FIBR/hsCRP/ApoE4.    Counseled.   INTENSITY/STATUS/LOCATION/RADIATION - variable/ currently subclinical/ as above  AGGRAVATORS - Multifactorial   RELIEVERS - medications, some Therapeutic Lifestyle Changes   TREATMENTS/COMPLIANCE/@GOAL? - as above/ could improve Therapeutic Lifestyle Changes/ clinically yes    PROBLEM #2-HISTORY OF PRESENT ILLNESS  PATIENT STATEMENT OF PROBLEM - health maintenance issues  ONSET - " discussed today  COURSE - Prevnar given in clinic today, advanced directives discussed and paperwork dispensed, Rx given for Shingrix.     Diagnosing METABOLIC SYNDROME  -?-Must have 3 or more of the following 5 Risk Factors(Patient meets criteria # 1, 4, 5)     RISK FACTOR    DEFINING LEVEL  1-Abdominal Obesity        Waist circumference@[email or web address]   Men (North Americans)  >102 cm (>40 inches)   Women (North Americans)  >88 cm (>35 inches)  (see literature for Ethnic Group waist circumference differences)  2-Triglycerides ?150 mg/dL (or on treatment for this lipid disorder)  3-HDL Cholesterol    Men  <40 mg/dL (or on treatment for this lipid disorder)   Women <50 mg/dL (or on treatment for this lipid disorder)  4-Blood Pressure  ?130 systolic or ?84 diastolic (or on HTN treatment)  5-Fasting Glucose ?100 mg/dL(or previously diagnosed DM or Ins. Resistance)  (FYI: If FBS ?100 mg/dL, then patient also has Insulin Resistance)    Synonyms  Hypertension-hyperglycemia-hyperuricemia syndrome   Syndrome X   Dysmetabolic syndrome X   Insulin resistance syndrome   Metabolic dyslipidemia   The deadly quartet (upper-body obesity, glucose intolerance, hypertriglyceridemia, and hypertension)   Civilization syndrome  Reaven Syndrome    Diagnosing INSULIN RESISTANCE: Any 1 of following (Patient meets criteria # 1)  1. Presence of METABOLIC SYNDROME  2. TRIGLYCERIDE/HDL RATIO:  - >3.5 = IR in Caucasians  - ?3.0 = IR in /Armenian Americans  - ?2.0 = IR in Non- Blacks  3. Fasting Blood Sugar ? 100 mg/dL         (If FBS > 126, then DM2)  4. Oral Glucose Tolerance Test   - One hour glucose: ? 125 mg/dL   - (If > 150, significantly increased risk of developing DM2)  - Two hour glucose: ? 120 mg/dL  - (120-139=only 33% B-cell fx. 140-199=only 15% B-cell fx)   - (200 or above=DM2 and only 10% B-cell fx.)  - PRE-DIABETES, a type of Insulin Resistance:  o Two hour glucose of 140 to 199  5. A1c ? 6.5%                  "    (or ? 5.7 % AND the following 2 Dental Parameters:    1- ? 26% of Gum Pockets are ?5mm depth    2- ? 4 Missing Teeth)       No Known Allergies    Patient Active Problem List    Diagnosis Date Noted   • Mixed hyperlipidemia with apolipoprotein E4 variant 02/13/2019   • Carotid atherosclerosis, bilateral 02/13/2019   • Metabolic syndrome 02/13/2019   • Insulin resistance 02/13/2019   • BMI 25.0-25.9,adult. Waist circumference: 41\" 02/13/2019   • Highly echogenic liver on ultrasound 02/13/2019   • Nocturnal hypoxia 02/13/2019   • hsCRP elevated 02/13/2019   • Need for shingles vaccine 02/13/2019   • Echocardiogram findings suggestive of PFO (patent foramen ovale) 02/17/2018   • Aortic valve sclerosis without stenosis 02/17/2018   • Bilateral renal cysts 02/08/2018   • Elevated hemoglobin A1c 02/08/2018   • Carotid atherosclerosis, left, mild 02/08/2018   • Positive genetic test for apolipoprotein E4 genotype 02/08/2018   • B12 deficiency 02/08/2018   • Lifestyle problems 02/08/2018   • Physical deconditioning 02/08/2018   • Moderately severe multivessel coronary atherosclerosis due to calcified coronary lesions 02/08/2018   • Agatston CAC score 200-399 02/08/2018   • Abnormal resting ECG findings 02/08/2018   • Diverticulosis of colon, per 2/5/16 Colonoscopy 02/07/2018   • Internal and external hemorrhoids without complication, per 2/5/16 Colonoscopy 02/07/2018   • Essential hypertension 01/29/2018   • Dyslipidemia 01/29/2018   • Anxiety 01/29/2018   • Prediabetes 01/29/2018   • Vitamin D deficiency 01/29/2018   • Well adult exam 01/29/2018   • Family history of first degree relative with dementia 01/29/2018   • Family history of colon cancer in father 01/29/2018   • Family history of hypertension 01/29/2018   • Family history of breast cancer in sister 01/29/2018   • Hiatal hernia 01/29/2018   • History of vertigo 01/29/2018   • History of nephritis 01/29/2018   • Osteoarthritis 01/29/2018   • Behind on " immunizations 2018   • Hydrocele in adult, left 2018       Current Outpatient Prescriptions on File Prior to Visit   Medication Sig Dispense Refill   • metFORMIN ER (GLUCOPHAGE XR) 500 MG TABLET SR 24 HR Take 1 Tab by mouth every day. 90 Tab 4   • escitalopram (LEXAPRO) 10 MG Tab Take 1 Tab by mouth every day. 90 Tab 4   • irbesartan-hydrochlorothiazide (AVALIDE) 150-12.5 MG per tablet Take 1 Tab by mouth every day. 90 Tab 3   • simvastatin (ZOCOR) 20 MG Tab Take 1 Tab by mouth every day. 90 Tab 4   • aspirin EC (ECOTRIN) 81 MG Tablet Delayed Response Take 1 Tab by mouth.     • Cholecalciferol (VITAMIN D) 2000 units Cap Take 1 Cap by mouth every day.     • Multiple Vitamins-Minerals (OCUVITE ADULT 50+) Cap Take 1 Each by mouth.       No current facility-administered medications on file prior to visit.        Social History     Social History   • Marital status:      Spouse name: N/A   • Number of children: N/A   • Years of education: N/A     Occupational History   •       [employer]     Social History Main Topics   • Smoking status: Former Smoker     Packs/day: 1.00     Years: 2.00     Types: Cigarettes     Start date: 1957     Quit date: 1959   • Smokeless tobacco: Never Used   • Alcohol use 4.2 oz/week     7 Cans of beer per week   • Drug use: No   • Sexual activity: Not Currently     Other Topics Concern   • Not on file     Social History Narrative   • No narrative on file       Family History   Problem Relation Age of Onset   • Dementia Mother 70         @ 81 years   • Hypertension Father 78         @ 79 years   • Cancer Father 77        Colon Cancer   • Kidney Disease Father 78        Kidney failure was cause of death   • Breast Cancer Sister 50   • Heart Attack Paternal Grandmother 78        Fatal MI   • Heart Attack Paternal Grandfather 60        Fatal MI       Patient's Past, Social, and Family History reviewed     REVIEW OF SYMPTOMS:                "Pertinent Positives as above.    All other systems reviewed and negative.     OBJECTIVE:    /68   Pulse 74   Temp 36.9 °C (98.5 °F) (Temporal)   Resp 12   Ht 1.816 m (5' 11.5\")   Wt 84.1 kg (185 lb 6.4 oz)   SpO2 99%   BMI 25.50 kg/m²   Body mass index is 25.5 kg/m².    Well developed, well nourished male, no acute distress, non-ill appearing. Comfortable, appears stated age, pleasant and cooperative, Alert and Oriented x 3.   HEAD: atraumatic, normocephalic   EYES: Conjunctiva normal, EOMI, PERRLA, acuity grossly intact.   EARS/NOSE/THROAT: TM's normal, no SSX of infection, no perforation, no hemotympanum, acuity grossly intact. Oropharynx: benign, no lesions noted. Nares: benign.   NECK: supple, no adenopathy, no thyromegaly or nodules, no JVD, no carotid bruits.   CHEST/LUNGS: clear to auscultation and percussion bilaterally. No adventitious breath sounds.   CARDIOVASCULAR: regular rate and rhythm, no murmur. PMI not displaced. Good central and peripheral pulses.   BACK: no CVA tenderness.   ABDOMEN: soft, non-tender, non-distended, no masses, no hepatosplenomegaly. Normal active bowel tones.   : deferred.   Rectal: ALEJANDRO wnl  Extremities: warm/well-perfused, no cyanosis, clubbing, or edema.   SKIN: clear, unbroken, no rashes, normal turgor.   Neuro: Mental Status: Alert and Oriented x 3. CN II-XII grossly intact. Gait normal. Non-focal, intact. Normal strength, sensation    ASSESSMENT:    Encounter Diagnoses   Name Primary?   • Executive History and Physical Examination Yes   • Essential hypertension    • Mixed hyperlipidemia with apolipoprotein E4 variant    • Behind on immunizations    • Elevated hemoglobin A1c    • Carotid atherosclerosis, bilateral    • Agatston CAC score 200-399    • Metabolic syndrome    • Insulin resistance    • BMI 25.0-25.9,adult. Waist circumference: 41\"    • Highly echogenic liver on ultrasound    • Nocturnal hypoxia    • hsCRP elevated    • Need for shingles vaccine  "       PLAN:    Total Face-to-Face time spent with patient: 90 minutes  Amount of time spent counseling patient and/or coordinating care: 50 minutes    The nature of patient counseling as below:  -Patient Education  -Differential Diagnoses and treatment options discussed  -Risks, benefits, alternatives discussed  -Labs reviewed with patient in detail  -Imaging/ETT/Spirometry/vision/hearing reports reviewed with patient in detail  -Health Maintenance Exam issues discussed  -Exercise  -Dietary recommendations discussed  -Weight Loss strategies discussed  -Therapeutic Lifestyle Changes discussed    The nature of coordination of care as below:  -Medications added: Ezetimibe 10 mg/day  -Medications discontinued: none  -Medications adjusted: none  -Continue present chronic medications  -Referrals: none  -Other: Plan to repeat OPO in 6 months.   Prevnar given in clinic today   Advanced Directives discussed and paperwork dispensed     -Seek medical attention immediately if worse    FOLLOW-UP:  -With me in 6 months

## 2019-02-07 NOTE — LETTER
"February 13, 2019        Jose Venturat  [address]  [place] [address]        Dear Jose:    Thank you for again participating in [facility].  We covered a great deal of information, and I have summarized my Assessment and Plan below.  Please carefully review all the information in this packet.  I would like to see you back for a follow-up appointment in about 6 months, with another Overnight Pulse Oximetry home-test a week or so ahead of time.     Overall, I consider you to be in good health. In terms of cardio-metabolic related issues, you have the following: treated hypertension, ApoE4 dyslipidemia, elevated A1c, metabolic syndrome, insulin resistance, elevated hsCRP, increased waist circumference, fatty liver on ultrasound, and mild nocturnal hypoxemia. The best treatment for most of these conditions is Therapeutic Lifestyle Changes.  Specifically, I strongly recommend eating \"real food, mostly plants, not too much\", daily exercise, striving for a normal weight/BMI, active stress management, 7-8 hours of quality sleep per night, a loving social environment, and an altruistic philosophy.  As discussed, I added daily Ezetimibe 10 mg to your medication regimen. You received a Prevnar Vaccine in clinic today. Please continue to attempt to obtain a Shingrix vaccine from your pharmacist. Keep up the good work, and if you have any questions or concerns, please don't hesitate to call.        Sincerely,        Pablito Kessler M.D.    ASSESSMENT:    Encounter Diagnoses   Name Primary?   • Executive History and Physical Examination Yes   • Essential hypertension    • Mixed hyperlipidemia with apolipoprotein E4 variant    • Behind on immunizations    • Elevated hemoglobin A1c    • Carotid atherosclerosis, bilateral    • Agatston CAC score 200-399    • Metabolic syndrome    • Insulin resistance    • BMI 25.0-25.9,adult. Waist circumference: 41\"    • Highly echogenic liver on ultrasound    • " Nocturnal hypoxia    • hsCRP elevated    • Need for shingles vaccine        PLAN:    Total Face-to-Face time spent with patient: 90 minutes  Amount of time spent counseling patient and/or coordinating care: 50 minutes    The nature of patient counseling as below:  -Patient Education  -Differential Diagnoses and treatment options discussed  -Risks, benefits, alternatives discussed  -Labs reviewed with patient in detail  -Imaging/ETT/Spirometry/vision/hearing reports reviewed with patient in detail  -Health Maintenance Exam issues discussed  -Exercise  -Dietary recommendations discussed  -Weight Loss strategies discussed  -Therapeutic Lifestyle Changes discussed    The nature of coordination of care as below:  -Medications added: Ezetimibe 10 mg/day  -Medications discontinued: none  -Medications adjusted: none  -Continue present chronic medications  -Referrals: none  -Other: Plan to repeat OPO in 6 months.   Prevnar given in clinic today   Advanced Directives discussed and paperwork dispensed     -Seek medical attention immediately if worse    FOLLOW-UP:  -With me in 6 months

## 2019-02-13 PROBLEM — R93.2 HIGHLY ECHOGENIC LIVER ON ULTRASOUND: Status: ACTIVE | Noted: 2019-02-13

## 2019-02-13 PROBLEM — E88.819 INSULIN RESISTANCE: Status: ACTIVE | Noted: 2019-02-13

## 2019-02-13 PROBLEM — Z23 NEED FOR SHINGLES VACCINE: Status: ACTIVE | Noted: 2019-02-13

## 2019-02-13 PROBLEM — G47.34 NOCTURNAL HYPOXIA: Status: ACTIVE | Noted: 2019-02-13

## 2019-02-13 PROBLEM — E78.2 MIXED HYPERLIPIDEMIA WITH APOLIPOPROTEIN E4 VARIANT: Status: ACTIVE | Noted: 2019-02-13

## 2019-02-13 PROBLEM — E88.810 METABOLIC SYNDROME: Status: ACTIVE | Noted: 2019-02-13

## 2019-02-13 PROBLEM — I65.23 CAROTID ATHEROSCLEROSIS, BILATERAL: Status: ACTIVE | Noted: 2019-02-13

## 2019-02-13 PROBLEM — R79.82 CRP ELEVATED: Status: ACTIVE | Noted: 2019-02-13

## 2019-02-13 NOTE — PATIENT INSTRUCTIONS
Current Outpatient Prescriptions Ordered in [facility]   Medication Sig Dispense Refill   • metFORMIN ER (GLUCOPHAGE XR) 500 MG TABLET SR 24 HR Take 1 Tab by mouth every day. 90 Tab 4   • escitalopram (LEXAPRO) 10 MG Tab Take 1 Tab by mouth every day. 90 Tab 4   • irbesartan-hydrochlorothiazide (AVALIDE) 150-12.5 MG per tablet Take 1 Tab by mouth every day. 90 Tab 3   • simvastatin (ZOCOR) 20 MG Tab Take 1 Tab by mouth every day. 90 Tab 4   • aspirin EC (ECOTRIN) 81 MG Tablet Delayed Response Take 1 Tab by mouth.     • Cholecalciferol (VITAMIN D) 2000 units Cap Take 1 Cap by mouth every day.     • Multiple Vitamins-Minerals (OCUVITE ADULT 50+) Cap Take 1 Each by mouth.       No current Epic-ordered facility-administered medications on file.

## 2019-03-15 DIAGNOSIS — E78.41 ELEVATED LIPOPROTEIN(A): ICD-10-CM

## 2019-03-15 DIAGNOSIS — E78.2 MIXED HYPERLIPIDEMIA: ICD-10-CM

## 2019-03-15 RX ORDER — EZETIMIBE 10 MG/1
10 TABLET ORAL DAILY
Qty: 90 TAB | Refills: 3 | Status: SHIPPED | OUTPATIENT
Start: 2019-03-15 | End: 2020-08-25

## 2019-05-08 ENCOUNTER — OFFICE VISIT (OUTPATIENT)
Dept: CARDIOLOGY | Facility: MEDICAL CENTER | Age: 69
End: 2019-05-08
Payer: COMMERCIAL

## 2019-05-08 VITALS
WEIGHT: 185 LBS | OXYGEN SATURATION: 93 % | HEIGHT: 72 IN | HEART RATE: 88 BPM | BODY MASS INDEX: 25.06 KG/M2 | SYSTOLIC BLOOD PRESSURE: 102 MMHG | DIASTOLIC BLOOD PRESSURE: 58 MMHG

## 2019-05-08 DIAGNOSIS — Q21.12 PFO (PATENT FORAMEN OVALE): ICD-10-CM

## 2019-05-08 DIAGNOSIS — Z79.899 ENCOUNTER FOR LONG-TERM (CURRENT) USE OF HIGH-RISK MEDICATION: ICD-10-CM

## 2019-05-08 DIAGNOSIS — I25.10 CORONARY ARTERY CALCIFICATION: ICD-10-CM

## 2019-05-08 DIAGNOSIS — I10 ESSENTIAL HYPERTENSION: ICD-10-CM

## 2019-05-08 DIAGNOSIS — I25.84 CORONARY ARTERY CALCIFICATION: ICD-10-CM

## 2019-05-08 DIAGNOSIS — E78.5 DYSLIPIDEMIA: ICD-10-CM

## 2019-05-08 PROCEDURE — 99215 OFFICE O/P EST HI 40 MIN: CPT | Performed by: INTERNAL MEDICINE

## 2019-05-08 ASSESSMENT — ENCOUNTER SYMPTOMS
LOSS OF CONSCIOUSNESS: 0
ABDOMINAL PAIN: 0
DIZZINESS: 0
ORTHOPNEA: 0
PND: 0
FALLS: 0
SHORTNESS OF BREATH: 0
PALPITATIONS: 0
DEPRESSION: 0

## 2019-05-08 NOTE — PROGRESS NOTES
"Subjective:   Jose Fallon is a 68-year-old male presented clinic for follow-up on coronary calcifications.    Pertinent history:  Hypertension  Hyperlipidemia  Coronary artery calcification    Patient has been doing well since his last appointment.  Denies any chest discomfort.  He has been walking regularly.  No dyspnea with exertion either.    His blood pressures have been well controlled, like today.  He continues to be on simvastatin for his hyperlipidemia which he is tolerating well.    He underwent an exercise stress test in January of this year and has multiple questions about this.     Past Medical History:   Diagnosis Date   • Anxiety     on Lexapro since    • Headache     \"life long\" per pt   • Hyperlipidemia    • Hypertension    • Vertigo      Past Surgical History:   Procedure Laterality Date   • HERNIA REPAIR       Family History   Problem Relation Age of Onset   • Dementia Mother 70         @ 81 years   • Hypertension Father 78         @ 79 years   • Cancer Father 77        Colon Cancer   • Kidney Disease Father 78        Kidney failure was cause of death   • Breast Cancer Sister 50   • Heart Attack Paternal Grandmother 78        Fatal MI   • Heart Attack Paternal Grandfather 60        Fatal MI     History   Smoking Status   • Former Smoker   • Packs/day: 1.00   • Years: 2.00   • Types: Cigarettes   • Start date: 1957   • Quit date: 1959   Smokeless Tobacco   • Never Used     Occasional alcohol use.    No Known Allergies  Outpatient Encounter Prescriptions as of 2019   Medication Sig Dispense Refill   • ezetimibe (ZETIA) 10 MG Tab Take 1 Tab by mouth every day. 90 Tab 3   • metFORMIN ER (GLUCOPHAGE XR) 500 MG TABLET SR 24 HR Take 1 Tab by mouth every day. 90 Tab 4   • escitalopram (LEXAPRO) 10 MG Tab Take 1 Tab by mouth every day. 90 Tab 4   • irbesartan-hydrochlorothiazide (AVALIDE) 150-12.5 MG per tablet Take 1 Tab by mouth every day. 90 Tab 3   • simvastatin " "(ZOCOR) 20 MG Tab Take 1 Tab by mouth every day. 90 Tab 4   • aspirin EC (ECOTRIN) 81 MG Tablet Delayed Response Take 1 Tab by mouth.     • Cholecalciferol (VITAMIN D) 2000 units Cap Take 1 Cap by mouth every day.     • Multiple Vitamins-Minerals (OCUVITE ADULT 50+) Cap Take 1 Each by mouth.       No facility-administered encounter medications on file as of 5/8/2019.      Review of Systems   Constitutional: Negative for malaise/fatigue.   Respiratory: Negative for shortness of breath.    Cardiovascular: Negative for chest pain, palpitations, orthopnea, leg swelling and PND.   Gastrointestinal: Negative for abdominal pain.   Musculoskeletal: Negative for falls.   Neurological: Negative for dizziness and loss of consciousness.   Psychiatric/Behavioral: Negative for depression.   All other systems reviewed and are negative.       Objective:   /58 (BP Location: Left arm, Patient Position: Sitting, BP Cuff Size: Adult)   Pulse 88   Ht 1.816 m (5' 11.5\")   Wt 83.9 kg (185 lb)   SpO2 93%   BMI 25.44 kg/m²     Physical Exam   Constitutional: He is oriented to person, place, and time. He appears well-developed and well-nourished. No distress.   HENT:   Head: Normocephalic and atraumatic.   Eyes: Conjunctivae are normal.   Neck: Normal range of motion. Neck supple. No JVD present.   Cardiovascular: Normal rate, regular rhythm and normal heart sounds.  Exam reveals no gallop and no friction rub.    No murmur heard.  Pulmonary/Chest: Effort normal and breath sounds normal. No respiratory distress. He has no wheezes. He has no rales.   Abdominal: Soft. There is no tenderness.   Musculoskeletal: He exhibits no edema.   Neurological: He is alert and oriented to person, place, and time.   Skin: Skin is warm and dry. He is not diaphoretic.   Psychiatric: He has a normal mood and affect.   Nursing note and vitals reviewed.    Exercise treadmill test performed in February 2018. Baseline EKG shows normal sinus rhythm. Q " waves in V1 through V3. Upsloping ST elevation and J-point elevation seen in the V2.  Patient exercised 2 minutes and 43 seconds on the Nain protocol. Test was reportedly stopped for target heart rate achieved. Patient denies having any symptoms. No ischemic ST-T wave changes were seen. 93% of maximum predicted target heart rate was achieved.    CT coronary calcium scoring performed in January 2018 showed a total coronary calcium score of 377.    Echocardiogram performed in February 2018 showed normal LV systolic function. Normal diastolic function. Right to left shunt seen on agitated saline contrast study suggestive of an intracardiac shunt.      Labs performed in November 2018 were reviewed and showed normal creatinine.    Exercise stress echocardiogram performed January 2019 was personally reviewed and per my interpretation showed no wall motion abnormalities.  Patient exercised for 8 minutes on the Nain protocol.    Assessment:     1. Essential hypertension     2. Dyslipidemia  Lipid Profile   3. Echocardiogram findings suggestive of PFO (patent foramen ovale)     4. Coronary artery calcification     5. Encounter for long-term (current) use of high-risk medication         Medical Decision Making:  Today's Assessment / Status / Plan:     Coronary artery calcification:  Hypertension:  Hyperlipidemia:  Patient has been doing well.  No anginal symptoms with exercise.  He recently underwent an exercise treadmill test which he did well on.  No wall motion abnormalities were noted.  Patient was reassured about the test findings.  Continue aspirin.  His blood pressure is at goal.  Continue irbesartan and hydrochlorothiazide at current dose.  His renal function has been normal.  Continue Zetia and simvastatin at current dose.  Lipid panel was ordered today.    Right to left shunt: Noted on echocardiogram suggestive of PFO.  Patient does not have any concerning symptoms.  Continue to monitor for now.  He is on  aspirin.    Return to clinic in 1 year or earlier if needed.    Thank you for allowing me to participate in the care of this patient. Please do not hesitate to contact me with any questions.    Heike Azul MD  Cardiologist  [facility]    PLEASE NOTE: This dictation was created using voice recognition software.

## 2019-05-08 NOTE — LETTER
"     [facility]   [address]   [address]  [place] [address]  Phone: [phone]  Fax: [phone]              Jose Fallon  1950    Encounter Date: 2019    Heike Azul M.D.          PROGRESS NOTE:  Subjective:   Jose Fallon is a 68-year-old male presented clinic for follow-up on coronary calcifications.    Pertinent history:  Hypertension  Hyperlipidemia  Coronary artery calcification    Patient has been doing well since his last appointment.  Denies any chest discomfort.  He has been walking regularly.  No dyspnea with exertion either.    His blood pressures have been well controlled, like today.  He continues to be on simvastatin for his hyperlipidemia which he is tolerating well.    He underwent an exercise stress test in January of this year and has multiple questions about this.     Past Medical History:   Diagnosis Date   • Anxiety     on Lexapro since    • Headache     \"life long\" per pt   • Hyperlipidemia    • Hypertension    • Vertigo      Past Surgical History:   Procedure Laterality Date   • HERNIA REPAIR       Family History   Problem Relation Age of Onset   • Dementia Mother 70         @ 81 years   • Hypertension Father 78         @ 79 years   • Cancer Father 77        Colon Cancer   • Kidney Disease Father 78        Kidney failure was cause of death   • Breast Cancer Sister 50   • Heart Attack Paternal Grandmother 78        Fatal MI   • Heart Attack Paternal Grandfather 60        Fatal MI     History   Smoking Status   • Former Smoker   • Packs/day: 1.00   • Years: 2.00   • Types: Cigarettes   • Start date: 1957   • Quit date: 1959   Smokeless Tobacco   • Never Used     Occasional alcohol use.    No Known Allergies  Outpatient Encounter Prescriptions as of 2019   Medication Sig Dispense Refill   • ezetimibe (ZETIA) 10 MG Tab Take 1 Tab by mouth every day. 90 Tab 3   • metFORMIN ER " "(GLUCOPHAGE XR) 500 MG TABLET SR 24 HR Take 1 Tab by mouth every day. 90 Tab 4   • escitalopram (LEXAPRO) 10 MG Tab Take 1 Tab by mouth every day. 90 Tab 4   • irbesartan-hydrochlorothiazide (AVALIDE) 150-12.5 MG per tablet Take 1 Tab by mouth every day. 90 Tab 3   • simvastatin (ZOCOR) 20 MG Tab Take 1 Tab by mouth every day. 90 Tab 4   • aspirin EC (ECOTRIN) 81 MG Tablet Delayed Response Take 1 Tab by mouth.     • Cholecalciferol (VITAMIN D) 2000 units Cap Take 1 Cap by mouth every day.     • Multiple Vitamins-Minerals (OCUVITE ADULT 50+) Cap Take 1 Each by mouth.       No facility-administered encounter medications on file as of 5/8/2019.      Review of Systems   Constitutional: Negative for malaise/fatigue.   Respiratory: Negative for shortness of breath.    Cardiovascular: Negative for chest pain, palpitations, orthopnea, leg swelling and PND.   Gastrointestinal: Negative for abdominal pain.   Musculoskeletal: Negative for falls.   Neurological: Negative for dizziness and loss of consciousness.   Psychiatric/Behavioral: Negative for depression.   All other systems reviewed and are negative.       Objective:   /58 (BP Location: Left arm, Patient Position: Sitting, BP Cuff Size: Adult)   Pulse 88   Ht 1.816 m (5' 11.5\")   Wt 83.9 kg (185 lb)   SpO2 93%   BMI 25.44 kg/m²      Physical Exam   Constitutional: He is oriented to person, place, and time. He appears well-developed and well-nourished. No distress.   HENT:   Head: Normocephalic and atraumatic.   Eyes: Conjunctivae are normal.   Neck: Normal range of motion. Neck supple. No JVD present.   Cardiovascular: Normal rate, regular rhythm and normal heart sounds.  Exam reveals no gallop and no friction rub.    No murmur heard.  Pulmonary/Chest: Effort normal and breath sounds normal. No respiratory distress. He has no wheezes. He has no rales.   Abdominal: Soft. There is no tenderness.   Musculoskeletal: He exhibits no edema.   Neurological: He is " alert and oriented to person, place, and time.   Skin: Skin is warm and dry. He is not diaphoretic.   Psychiatric: He has a normal mood and affect.   Nursing note and vitals reviewed.    Exercise treadmill test performed in February 2018. Baseline EKG shows normal sinus rhythm. Q waves in V1 through V3. Upsloping ST elevation and J-point elevation seen in the V2.  Patient exercised 2 minutes and 43 seconds on the Nain protocol. Test was reportedly stopped for target heart rate achieved. Patient denies having any symptoms. No ischemic ST-T wave changes were seen. 93% of maximum predicted target heart rate was achieved.    CT coronary calcium scoring performed in January 2018 showed a total coronary calcium score of 377.    Echocardiogram performed in February 2018 showed normal LV systolic function. Normal diastolic function. Right to left shunt seen on agitated saline contrast study suggestive of an intracardiac shunt.      Labs performed in November 2018 were reviewed and showed normal creatinine.    Exercise stress echocardiogram performed January 2019 was personally reviewed and per my interpretation showed no wall motion abnormalities.  Patient exercised for 8 minutes on the Nain protocol.    Assessment:     1. Essential hypertension     2. Dyslipidemia  Lipid Profile   3. Echocardiogram findings suggestive of PFO (patent foramen ovale)     4. Coronary artery calcification     5. Encounter for long-term (current) use of high-risk medication         Medical Decision Making:  Today's Assessment / Status / Plan:     Coronary artery calcification:  Hypertension:  Hyperlipidemia:  Patient has been doing well.  No anginal symptoms with exercise.  He recently underwent an exercise treadmill test which he did well on.  No wall motion abnormalities were noted.  Patient was reassured about the test findings.  Continue aspirin.  His blood pressure is at goal.  Continue irbesartan and hydrochlorothiazide at current dose.   His renal function has been normal.  Continue Zetia and simvastatin at current dose.  Lipid panel was ordered today.    Right to left shunt: Noted on echocardiogram suggestive of PFO.  Patient does not have any concerning symptoms.  Continue to monitor for now.  He is on aspirin.    Return to clinic in 1 year or earlier if needed.    Thank you for allowing me to participate in the care of this patient. Please do not hesitate to contact me with any questions.    Heike Azul MD  Cardiologist  [facility]    PLEASE NOTE: This dictation was created using voice recognition software.           Pablito Kessler M.D.  [address]  [address]  [place] [address]  VIA In Basket

## 2019-09-23 DIAGNOSIS — E78.5 DYSLIPIDEMIA: ICD-10-CM

## 2019-09-23 RX ORDER — SIMVASTATIN 20 MG
20 TABLET ORAL DAILY
Qty: 90 TAB | Refills: 0 | Status: SHIPPED | OUTPATIENT
Start: 2019-09-23 | End: 2019-12-23

## 2019-11-06 ENCOUNTER — OFFICE VISIT (OUTPATIENT)
Dept: INTERNAL MEDICINE | Facility: IMAGING CENTER | Age: 69
End: 2019-11-06
Payer: COMMERCIAL

## 2019-11-06 VITALS
RESPIRATION RATE: 14 BRPM | HEART RATE: 78 BPM | DIASTOLIC BLOOD PRESSURE: 64 MMHG | SYSTOLIC BLOOD PRESSURE: 100 MMHG | BODY MASS INDEX: 24.08 KG/M2 | OXYGEN SATURATION: 97 % | WEIGHT: 172 LBS | HEIGHT: 71 IN | TEMPERATURE: 99.3 F

## 2019-11-06 DIAGNOSIS — E88.810 METABOLIC SYNDROME: ICD-10-CM

## 2019-11-06 DIAGNOSIS — R63.4 WEIGHT LOSS: ICD-10-CM

## 2019-11-06 DIAGNOSIS — Z86.010 HISTORY OF COLON POLYPS: ICD-10-CM

## 2019-11-06 DIAGNOSIS — E78.2 MIXED HYPERLIPIDEMIA WITH APOLIPOPROTEIN E4 VARIANT: ICD-10-CM

## 2019-11-06 DIAGNOSIS — Z87.448 HISTORY OF NEPHRITIS: ICD-10-CM

## 2019-11-06 DIAGNOSIS — D64.9 ANEMIA, NORMOCYTIC NORMOCHROMIC: ICD-10-CM

## 2019-11-06 DIAGNOSIS — I10 ESSENTIAL HYPERTENSION: ICD-10-CM

## 2019-11-06 DIAGNOSIS — R93.1 AGATSTON CAC SCORE 200-399: ICD-10-CM

## 2019-11-06 DIAGNOSIS — E53.8 B12 DEFICIENCY: ICD-10-CM

## 2019-11-06 DIAGNOSIS — F41.1 GENERALIZED ANXIETY DISORDER: ICD-10-CM

## 2019-11-06 DIAGNOSIS — Z12.5 SCREENING FOR PROSTATE CANCER: ICD-10-CM

## 2019-11-06 DIAGNOSIS — Z79.899 LONG TERM USE OF DRUG: ICD-10-CM

## 2019-11-06 DIAGNOSIS — Z23 NEED FOR INFLUENZA VACCINATION: ICD-10-CM

## 2019-11-06 DIAGNOSIS — E55.9 VITAMIN D DEFICIENCY: ICD-10-CM

## 2019-11-06 PROBLEM — Z86.0100 HISTORY OF COLON POLYPS: Status: ACTIVE | Noted: 2019-11-06

## 2019-11-06 PROCEDURE — 90662 IIV NO PRSV INCREASED AG IM: CPT | Performed by: INTERNAL MEDICINE

## 2019-11-06 PROCEDURE — 99214 OFFICE O/P EST MOD 30 MIN: CPT | Mod: 25 | Performed by: INTERNAL MEDICINE

## 2019-11-06 PROCEDURE — 90471 IMMUNIZATION ADMIN: CPT | Performed by: INTERNAL MEDICINE

## 2019-11-06 RX ORDER — ESCITALOPRAM OXALATE 10 MG/1
TABLET ORAL
Qty: 135 TAB | Refills: 3 | Status: SHIPPED | OUTPATIENT
Start: 2019-11-06 | End: 2021-01-04

## 2019-11-06 ASSESSMENT — ENCOUNTER SYMPTOMS
ROS GI COMMENTS: NO BLACK STOOLS
WEIGHT LOSS: 1
DIARRHEA: 0
DIZZINESS: 0
BLOOD IN STOOL: 0
ABDOMINAL PAIN: 0
CONSTIPATION: 0
HEARTBURN: 0

## 2019-11-06 NOTE — PROGRESS NOTES
"Established Patient Note   HPI:        Mani is here today to establish since his physician left current practice. He is being treated for HTN, hyperlipidemia and metabolic syndrome (prediabetes) for which he takes medications. He is being seen by cardiologist to follow his elevated CAC. He states he has lost weight over past few months without being on any diet. He states he has been more active than in past such as work around house that he had not done in past. He states he will awaken typically once a night to urinate but will have difficulty falling back to sleep with some anxiety. He has been treated for anxiety for past 10-15 years.    Past Medical History:   Diagnosis Date   • Agatston CAC score 200-399 2/8/2018    CAC Jan. 2018: LMA 0, LCX 63.5, .1, RCA 13.4, PDA 0 (total 377.7)   • Anxiety     on Lexapro since 2003   • B12 deficiency 2/8/2018   • Carotid atherosclerosis, bilateral 2/13/2019   • Headache     \"life long\" per pt   • History of colon polyps 11/6/2019   • Hyperlipidemia    • Hypertension    • Vertigo 2015       Current Outpatient Medications   Medication Sig Dispense Refill   • escitalopram (LEXAPRO) 10 MG Tab Take 1.5 tablets once a day 135 Tab 3   • simvastatin (ZOCOR) 20 MG Tab Take 1 Tab by mouth every day. 90 Tab 0   • irbesartan-hydrochlorothiazide (AVALIDE) 150-12.5 MG per tablet TAKE 1 TABLET BY MOUTH EVERY DAY 90 Tab 0   • ezetimibe (ZETIA) 10 MG Tab Take 1 Tab by mouth every day. (Patient taking differently: Take 5 mg by mouth every day.) 90 Tab 3   • metFORMIN ER (GLUCOPHAGE XR) 500 MG TABLET SR 24 HR Take 1 Tab by mouth every day. 90 Tab 4   • aspirin EC (ECOTRIN) 81 MG Tablet Delayed Response Take 1 Tab by mouth.     • Cholecalciferol (VITAMIN D) 2000 units Cap Take 1 Cap by mouth every day.     • Multiple Vitamins-Minerals (OCUVITE ADULT 50+) Cap Take 1 Each by mouth.       No current facility-administered medications for this visit.          No Known Allergies      Social " "History     Tobacco Use   • Smoking status: Former Smoker     Packs/day: 1.00     Years: 2.00     Pack years: 2.00     Types: Cigarettes     Start date: 1957     Last attempt to quit: 1959     Years since quittin.9   • Smokeless tobacco: Never Used   Substance Use Topics   • Alcohol use: Yes     Alcohol/week: 4.2 oz     Types: 7 Cans of beer per week   • Drug use: No       Past Surgical History:   Procedure Laterality Date   • HERNIA REPAIR          Review of Systems   Constitutional: Positive for weight loss. Negative for malaise/fatigue.        Appetite decent.   Cardiovascular: Negative for chest pain.   Gastrointestinal: Negative for abdominal pain, blood in stool, constipation, diarrhea and heartburn.        No black stools   Neurological: Negative for dizziness.       Ambulatory Vitals  /64 (BP Location: Left arm, Patient Position: Sitting, BP Cuff Size: Adult)   Pulse 78   Temp 37.4 °C (99.3 °F) (Temporal)   Resp 14   Ht 1.803 m (5' 11\")   Wt 78 kg (172 lb)   SpO2 97%   BMI 23.99 kg/m²     Physical Exam   Constitutional: He is well-developed, well-nourished, and in no distress. No distress.   Neck: Neck supple. No JVD present. No thyromegaly present.   Cardiovascular: Normal rate, regular rhythm, normal heart sounds and intact distal pulses. Exam reveals no gallop and no friction rub.   No murmur heard.  No carotid bruits bilaterally.   Pulmonary/Chest: Effort normal and breath sounds normal. He has no wheezes. He has no rales.   Abdominal: Soft. He exhibits no distension and no mass. There is no tenderness.   Genitourinary:    Genitourinary Comments: No flank tenderness bilaterally.     Musculoskeletal:         General: No edema.   Neurological: He is alert. No cranial nerve deficit. Gait normal. Coordination normal.   Skin: He is not diaphoretic.       Component      Latest Ref Rng & Units 2018   WBC      4.8 - 10.8 K/uL 5.6    RBC      4.70 - 6.10 M/uL 4.54 " (L)    Hemoglobin      14.0 - 18.0 g/dL 13.3 (L)    Hematocrit      42.0 - 52.0 % 41.9 (L)    MCV      81.4 - 97.8 fL 92.3    MCH      27.0 - 33.0 pg 29.3    MCHC      33.7 - 35.3 g/dL 31.7 (L)    RDW      35.9 - 50.0 fL 42.0    Platelet Count      164 - 446 K/uL 229    MPV      9.0 - 12.9 fL 10.7    Neutrophils-Polys      44.00 - 72.00 % 57.80    Lymphocytes      22.00 - 41.00 % 25.60    Monocytes      0.00 - 13.40 % 7.90    Eosinophils      0.00 - 6.90 % 7.60 (H)    Basophils      0.00 - 1.80 % 0.70    Immature Granulocytes      0.00 - 0.90 % 0.40    Nucleated RBC      /100 WBC 0.00    Neutrophils (Absolute)      1.82 - 7.42 K/uL 3.21    Lymphs (Absolute)      1.00 - 4.80 K/uL 1.42    Monos (Absolute)      0.00 - 0.85 K/uL 0.44    Eos (Absolute)      0.00 - 0.51 K/uL 0.42    Baso (Absolute)      0.00 - 0.12 K/uL 0.04    Immature Granulocytes (abs)      0.00 - 0.11 K/uL 0.02    NRBC (Absolute)      K/uL 0.00    Color       Yellow Yellow   Character       Clear Clear   Specific Gravity      <1.035 1.026 1.027   Ph      5.0 - 8.0 5.5 5.0   Glucose      Negative mg/dL Negative Negative   Ketones      Negative mg/dL Negative Negative   Protein      Negative mg/dL Negative Negative   Bilirubin      Negative Negative Negative   Urobilinogen, Urine      Negative 0.2 0.2   Nitrite      Negative Negative Negative   Leukocyte Esterase      Negative Negative Negative   Occult Blood      Negative Negative Negative   Micro Urine Req       see below see below   Sodium      135 - 145 mmol/L 144    Potassium      3.6 - 5.5 mmol/L 3.8    Chloride      96 - 112 mmol/L 108    Co2      20 - 33 mmol/L 29    Glucose      65 - 99 mg/dL 97    Creatinine      0.50 - 1.40 mg/dL 1.15    Bun      8 - 22 mg/dL 29 (H)    Calcium      8.5 - 10.5 mg/dL 9.3    Phosphorus      2.5 - 4.5 mg/dL 3.7    Albumin      3.2 - 4.9 g/dL 3.9    Iron      50 - 180 ug/dL 65    Total Iron Binding      250 - 450 ug/dL 336    % Saturation      15 - 55 % 19     Total Protein, Urine      0.0 - 15.0 mg/dL 9.3    Creatinine, Random Urine      mg/dL 184.00    Protein Creatinine Ratio      15 - 68 mg/g 51    Creatinine, Urine      mg/dL  253.60   Microalbumin, Urine Random      mg/dL  <0.7   Micro Alb Creat Ratio      0 - 30 mg/g  see below   GFR If African American      >60 mL/min/1.73 m 2 >60    GFR If Non African American      >60 mL/min/1.73 m 2 >60    Ferritin      22.0 - 322.0 ng/mL 98.8    Uric Acid      2.5 - 8.3 mg/dL 5.7    Hepatitis C Antibody      Negative  Negative     Assessment and Plan:     1. Weight loss     2. Essential hypertension  Comp Metabolic Panel    LipoFit by NMR    TSH   3. Anemia, normocytic normochromic  VITAMIN B12    CBC WITH DIFFERENTIAL    FERRITIN    IRON/TOTAL IRON BIND    HEMOGLOBIN A1C    Comp Metabolic Panel    TSH   4. Agatston CAC score 200-399     5. Vitamin D deficiency  VITAMIN D,25 HYDROXY   6. Metabolic syndrome  URINALYSIS    Comp Metabolic Panel    LipoFit by NMR    TSH   7. Mixed hyperlipidemia with apolipoprotein E4 variant  LipoFit by NMR    TSH   8. B12 deficiency     9. History of nephritis  Comp Metabolic Panel   10. Screening for prostate cancer  PROSTATE SPECIFIC AG SCREENING    URINALYSIS   11. History of colon polyps     12. Long term use of drug  CREATINE KINASE   13. Generalized anxiety disorder  escitalopram (LEXAPRO) 10 MG Tab     Advised monitor BP at home; if systolic begins to drop below 100 he will break avalide in half to take qd. Will order yearly blood work for January with follow up after. Advised he continue to weigh himself 3 times a week and if weight continues to drop will need blood work done sooner and more aggressive work up. If weight stabilizes over next 3-4 weeks he will follow up as scheduled. I have discussed would like to avoid benzodiazepine; he is willing to try higher dose of lexapro.    Korey Onofre M.D.

## 2019-11-13 DIAGNOSIS — I10 ESSENTIAL HYPERTENSION: ICD-10-CM

## 2019-11-13 RX ORDER — IRBESARTAN AND HYDROCHLOROTHIAZIDE 150; 12.5 MG/1; MG/1
1 TABLET, FILM COATED ORAL
Qty: 90 TAB | Refills: 3 | Status: SHIPPED | OUTPATIENT
Start: 2019-11-13 | End: 2021-01-29

## 2019-12-20 DIAGNOSIS — E78.5 DYSLIPIDEMIA: ICD-10-CM

## 2019-12-23 RX ORDER — SIMVASTATIN 20 MG
TABLET ORAL
Qty: 90 TAB | Refills: 1 | Status: SHIPPED | OUTPATIENT
Start: 2019-12-23 | End: 2020-06-15

## 2020-01-21 ENCOUNTER — HOSPITAL ENCOUNTER (OUTPATIENT)
Facility: MEDICAL CENTER | Age: 70
End: 2020-01-21
Attending: INTERNAL MEDICINE
Payer: COMMERCIAL

## 2020-01-21 ENCOUNTER — NON-PROVIDER VISIT (OUTPATIENT)
Dept: INTERNAL MEDICINE | Facility: IMAGING CENTER | Age: 70
End: 2020-01-21
Payer: COMMERCIAL

## 2020-01-21 DIAGNOSIS — Z79.899 LONG TERM USE OF DRUG: ICD-10-CM

## 2020-01-21 DIAGNOSIS — Z01.89 ENCOUNTER FOR ROUTINE LABORATORY TESTING: ICD-10-CM

## 2020-01-21 DIAGNOSIS — I10 ESSENTIAL HYPERTENSION: ICD-10-CM

## 2020-01-21 DIAGNOSIS — Z87.448 HISTORY OF NEPHRITIS: ICD-10-CM

## 2020-01-21 DIAGNOSIS — Z12.5 SCREENING FOR PROSTATE CANCER: ICD-10-CM

## 2020-01-21 DIAGNOSIS — E88.810 METABOLIC SYNDROME: ICD-10-CM

## 2020-01-21 DIAGNOSIS — E55.9 VITAMIN D DEFICIENCY: ICD-10-CM

## 2020-01-21 DIAGNOSIS — E78.2 MIXED HYPERLIPIDEMIA WITH APOLIPOPROTEIN E4 VARIANT: ICD-10-CM

## 2020-01-21 DIAGNOSIS — D64.9 ANEMIA, NORMOCYTIC NORMOCHROMIC: ICD-10-CM

## 2020-01-21 LAB
25(OH)D3 SERPL-MCNC: 40 NG/ML (ref 30–100)
ALBUMIN SERPL BCP-MCNC: 4.2 G/DL (ref 3.2–4.9)
ALBUMIN/GLOB SERPL: 1.2 G/DL
ALP SERPL-CCNC: 54 U/L (ref 30–99)
ALT SERPL-CCNC: 19 U/L (ref 2–50)
ANION GAP SERPL CALC-SCNC: 5 MMOL/L (ref 0–11.9)
APPEARANCE UR: CLEAR
AST SERPL-CCNC: 23 U/L (ref 12–45)
BASOPHILS # BLD AUTO: 0.7 % (ref 0–1.8)
BASOPHILS # BLD: 0.04 K/UL (ref 0–0.12)
BILIRUB SERPL-MCNC: 0.8 MG/DL (ref 0.1–1.5)
BILIRUB UR QL STRIP.AUTO: NEGATIVE
BUN SERPL-MCNC: 24 MG/DL (ref 8–22)
CALCIUM SERPL-MCNC: 9.1 MG/DL (ref 8.5–10.5)
CHLORIDE SERPL-SCNC: 105 MMOL/L (ref 96–112)
CK SERPL-CCNC: 175 U/L (ref 0–154)
CO2 SERPL-SCNC: 30 MMOL/L (ref 20–33)
COLOR UR: YELLOW
CREAT SERPL-MCNC: 1.24 MG/DL (ref 0.5–1.4)
EOSINOPHIL # BLD AUTO: 0.49 K/UL (ref 0–0.51)
EOSINOPHIL NFR BLD: 8.7 % (ref 0–6.9)
ERYTHROCYTE [DISTWIDTH] IN BLOOD BY AUTOMATED COUNT: 42 FL (ref 35.9–50)
EST. AVERAGE GLUCOSE BLD GHB EST-MCNC: 120 MG/DL
FERRITIN SERPL-MCNC: 85.4 NG/ML (ref 22–322)
GLOBULIN SER CALC-MCNC: 3.6 G/DL (ref 1.9–3.5)
GLUCOSE SERPL-MCNC: 87 MG/DL (ref 65–99)
GLUCOSE UR STRIP.AUTO-MCNC: NEGATIVE MG/DL
HBA1C MFR BLD: 5.8 % (ref 0–5.6)
HCT VFR BLD AUTO: 45.1 % (ref 42–52)
HGB BLD-MCNC: 14.1 G/DL (ref 14–18)
IMM GRANULOCYTES # BLD AUTO: 0.02 K/UL (ref 0–0.11)
IMM GRANULOCYTES NFR BLD AUTO: 0.4 % (ref 0–0.9)
IRON SATN MFR SERPL: 33 % (ref 15–55)
IRON SERPL-MCNC: 108 UG/DL (ref 50–180)
KETONES UR STRIP.AUTO-MCNC: NEGATIVE MG/DL
LEUKOCYTE ESTERASE UR QL STRIP.AUTO: NEGATIVE
LYMPHOCYTES # BLD AUTO: 1.57 K/UL (ref 1–4.8)
LYMPHOCYTES NFR BLD: 27.9 % (ref 22–41)
MCH RBC QN AUTO: 29.6 PG (ref 27–33)
MCHC RBC AUTO-ENTMCNC: 31.3 G/DL (ref 33.7–35.3)
MCV RBC AUTO: 94.5 FL (ref 81.4–97.8)
MICRO URNS: NORMAL
MONOCYTES # BLD AUTO: 0.36 K/UL (ref 0–0.85)
MONOCYTES NFR BLD AUTO: 6.4 % (ref 0–13.4)
NEUTROPHILS # BLD AUTO: 3.14 K/UL (ref 1.82–7.42)
NEUTROPHILS NFR BLD: 55.9 % (ref 44–72)
NITRITE UR QL STRIP.AUTO: NEGATIVE
NRBC # BLD AUTO: 0 K/UL
NRBC BLD-RTO: 0 /100 WBC
PH UR STRIP.AUTO: 5 [PH] (ref 5–8)
PLATELET # BLD AUTO: 206 K/UL (ref 164–446)
PMV BLD AUTO: 10.3 FL (ref 9–12.9)
POTASSIUM SERPL-SCNC: 3.7 MMOL/L (ref 3.6–5.5)
PROT SERPL-MCNC: 7.8 G/DL (ref 6–8.2)
PROT UR QL STRIP: NEGATIVE MG/DL
PSA SERPL-MCNC: 0.98 NG/ML (ref 0–4)
RBC # BLD AUTO: 4.77 M/UL (ref 4.7–6.1)
RBC UR QL AUTO: NEGATIVE
SODIUM SERPL-SCNC: 140 MMOL/L (ref 135–145)
SP GR UR STRIP.AUTO: 1.03
TIBC SERPL-MCNC: 332 UG/DL (ref 250–450)
TSH SERPL DL<=0.005 MIU/L-ACNC: 2.28 UIU/ML (ref 0.38–5.33)
UROBILINOGEN UR STRIP.AUTO-MCNC: 0.2 MG/DL
VIT B12 SERPL-MCNC: 294 PG/ML (ref 211–911)
WBC # BLD AUTO: 5.6 K/UL (ref 4.8–10.8)

## 2020-01-21 PROCEDURE — 82728 ASSAY OF FERRITIN: CPT

## 2020-01-21 PROCEDURE — 82306 VITAMIN D 25 HYDROXY: CPT

## 2020-01-21 PROCEDURE — 83704 LIPOPROTEIN BLD QUAN PART: CPT

## 2020-01-21 PROCEDURE — 81003 URINALYSIS AUTO W/O SCOPE: CPT

## 2020-01-21 PROCEDURE — 84443 ASSAY THYROID STIM HORMONE: CPT

## 2020-01-21 PROCEDURE — 85025 COMPLETE CBC W/AUTO DIFF WBC: CPT

## 2020-01-21 PROCEDURE — 80061 LIPID PANEL: CPT

## 2020-01-21 PROCEDURE — 83550 IRON BINDING TEST: CPT

## 2020-01-21 PROCEDURE — 82607 VITAMIN B-12: CPT

## 2020-01-21 PROCEDURE — 84153 ASSAY OF PSA TOTAL: CPT

## 2020-01-21 PROCEDURE — 82550 ASSAY OF CK (CPK): CPT

## 2020-01-21 PROCEDURE — 80053 COMPREHEN METABOLIC PANEL: CPT

## 2020-01-21 PROCEDURE — 83036 HEMOGLOBIN GLYCOSYLATED A1C: CPT

## 2020-01-21 PROCEDURE — 83540 ASSAY OF IRON: CPT

## 2020-01-24 LAB
CHOLEST SERPL-MCNC: 105 MG/DL
HDL PARTICAL NO Q4363: 25.5 UMOL/L
HDL SIZE Q4361: 9.3 NM
HDLC SERPL-MCNC: 52 MG/DL (ref 40–59)
HLD.LARGE SERPL-SCNC: 6.9 UMOL/L
L VLDL PART NO Q4357: <1.5 NMOL/L
LDL SERPL QN: 20.8 NM
LDL SERPL-SCNC: 747 NMOL/L
LDL SMALL SERPL-SCNC: 224 NMOL/L
LDLC SERPL CALC-MCNC: 39 MG/DL
PATHOLOGY STUDY: ABNORMAL
TRIGL SERPL-MCNC: 68 MG/DL (ref 30–149)
VLDL SIZE Q4362: 44.5 NM

## 2020-02-11 DIAGNOSIS — R73.03 PREDIABETES: ICD-10-CM

## 2020-02-11 RX ORDER — METFORMIN HYDROCHLORIDE 500 MG/1
500 TABLET, EXTENDED RELEASE ORAL DAILY
Qty: 90 TAB | Refills: 3 | Status: SHIPPED | OUTPATIENT
Start: 2020-02-11 | End: 2021-02-08

## 2020-03-30 DIAGNOSIS — E53.8 B12 DEFICIENCY: ICD-10-CM

## 2020-03-30 DIAGNOSIS — Z79.899 LONG TERM USE OF DRUG: ICD-10-CM

## 2020-03-30 DIAGNOSIS — R74.8 ELEVATED CPK: ICD-10-CM

## 2020-03-30 DIAGNOSIS — E78.2 MIXED HYPERLIPIDEMIA WITH APOLIPOPROTEIN E4 VARIANT: ICD-10-CM

## 2020-03-30 DIAGNOSIS — R73.03 PREDIABETES: ICD-10-CM

## 2020-03-30 NOTE — PROGRESS NOTES
Reviewed lab results with Mani. Discussed that his B12 level is low (below 300) and to start B complex qd. Lipoprofile is good; he will remain on simvastatin since he is tolerating without any symptoms of myalgia even though his CPK is mildly elevated. Will check CMP, HbA1c, lipoprofile NMR, B12, CPK in 6 months with follow up to review 2 weeks later.

## 2020-05-27 ENCOUNTER — APPOINTMENT (OUTPATIENT)
Dept: CARDIOLOGY | Facility: MEDICAL CENTER | Age: 70
End: 2020-05-27
Payer: COMMERCIAL

## 2020-05-28 ENCOUNTER — TELEMEDICINE (OUTPATIENT)
Dept: CARDIOLOGY | Facility: MEDICAL CENTER | Age: 70
End: 2020-05-28
Payer: COMMERCIAL

## 2020-05-28 VITALS — WEIGHT: 175 LBS | HEIGHT: 71 IN | BODY MASS INDEX: 24.5 KG/M2

## 2020-05-28 DIAGNOSIS — I10 ESSENTIAL HYPERTENSION: ICD-10-CM

## 2020-05-28 PROCEDURE — 99212 OFFICE O/P EST SF 10 MIN: CPT | Mod: 95,CR | Performed by: INTERNAL MEDICINE

## 2020-05-28 ASSESSMENT — FIBROSIS 4 INDEX: FIB4 SCORE: 1.82

## 2020-05-28 NOTE — PROGRESS NOTES
Telemedicine Visit: Established Patient     This encounter was conducted via Zoom .   Verbal consent was obtained. Patient's identity was verified.    Subjective:   CC: Hypertension    Jose Fallon is a 70 y.o. male presenting to clinic for follow-up on hypertension.    Patient has been doing really well since his last appointment.  He has been exercising regularly without any cardiac symptoms.  His blood pressures have been mostly below 120s systolic.    ROS   See HPI.    He  has a past medical history of Agatston CAC score 200-399 (2018), Anxiety, B12 deficiency (2018), Carotid atherosclerosis, bilateral (2019), Headache, History of colon polyps (2019), Hyperlipidemia, Hypertension, and Vertigo ().    He  has a past surgical history that includes hernia repair ().    Family History   Problem Relation Age of Onset   • Dementia Mother 70         @ 81 years   • Hypertension Father 78         @ 79 years   • Cancer Father 77        Colon Cancer   • Kidney Disease Father 78        Kidney failure was cause of death   • Breast Cancer Sister 50   • Heart Attack Paternal Grandmother 78        Fatal MI   • Heart Attack Paternal Grandfather 60        Fatal MI       No Known Allergies    Current medicines (including changes today)  Current Outpatient Medications   Medication Sig Dispense Refill   • B Complex Vitamins (B COMPLEX 1 PO)      • metFORMIN ER (GLUCOPHAGE XR) 500 MG TABLET SR 24 HR Take 1 Tab by mouth every day. 90 Tab 3   • simvastatin (ZOCOR) 20 MG Tab TAKE ONE TABLET BY MOUTH EVERY DAY 90 Tab 1   • irbesartan-hydrochlorothiazide (AVALIDE) 150-12.5 MG per tablet Take 1 Tab by mouth every day. 90 Tab 3   • escitalopram (LEXAPRO) 10 MG Tab Take 1.5 tablets once a day 135 Tab 3   • ezetimibe (ZETIA) 10 MG Tab Take 1 Tab by mouth every day. (Patient taking differently: Take 5 mg by mouth every day.) 90 Tab 3   • aspirin EC (ECOTRIN) 81 MG Tablet Delayed Response Take 1 Tab by  mouth.     • Cholecalciferol (VITAMIN D) 2000 units Cap Take 1 Cap by mouth every day.     • Multiple Vitamins-Minerals (OCUVITE ADULT 50+) Cap Take 1 Each by mouth.       No current facility-administered medications for this visit.         Objective:   Vitals obtained by patient:  Vitals:       Physical Exam:  Constitutional: Alert, no distress, well-groomed.  Psych: Alert and oriented x3, normal affect and mood.     Assessment and Plan:   1. Essential hypertension          Blood pressure has been doing well.  Continue current regimen.  No other concerns.      Return to clinic in 1 year or prn.    Heike Azul MD, [facility]  Cardiologist  [facility]

## 2020-05-28 NOTE — LETTER
[facility]   [address]   [address]  [place] [address]  Phone: [phone]  Fax: [phone]              Jose Fallon  1950    Encounter Date: 2020    Heike Azul M.D.          PROGRESS NOTE:  Telemedicine Visit: Established Patient     This encounter was conducted via Zoom .   Verbal consent was obtained. Patient's identity was verified.    Subjective:   CC: Hypertension    Jose Fallon is a 70 y.o. male presenting to clinic for follow-up on hypertension.    Patient has been doing really well since his last appointment.  He has been exercising regularly without any cardiac symptoms.  His blood pressures have been mostly below 120s systolic.    ROS   See HPI.    He  has a past medical history of Agatston CAC score 200-399 (2018), Anxiety, B12 deficiency (2018), Carotid atherosclerosis, bilateral (2019), Headache, History of colon polyps (2019), Hyperlipidemia, Hypertension, and Vertigo ().    He  has a past surgical history that includes hernia repair ().    Family History   Problem Relation Age of Onset   • Dementia Mother 70         @ 81 years   • Hypertension Father 78         @ 79 years   • Cancer Father 77        Colon Cancer   • Kidney Disease Father 78        Kidney failure was cause of death   • Breast Cancer Sister 50   • Heart Attack Paternal Grandmother 78        Fatal MI   • Heart Attack Paternal Grandfather 60        Fatal MI       No Known Allergies    Current medicines (including changes today)  Current Outpatient Medications   Medication Sig Dispense Refill   • B Complex Vitamins (B COMPLEX 1 PO)      • metFORMIN ER (GLUCOPHAGE XR) 500 MG TABLET SR 24 HR Take 1 Tab by mouth every day. 90 Tab 3   • simvastatin (ZOCOR) 20 MG Tab TAKE ONE TABLET BY MOUTH EVERY DAY 90 Tab 1   • irbesartan-hydrochlorothiazide (AVALIDE) 150-12.5 MG per tablet Take 1 Tab by mouth every day.  90 Tab 3   • escitalopram (LEXAPRO) 10 MG Tab Take 1.5 tablets once a day 135 Tab 3   • ezetimibe (ZETIA) 10 MG Tab Take 1 Tab by mouth every day. (Patient taking differently: Take 5 mg by mouth every day.) 90 Tab 3   • aspirin EC (ECOTRIN) 81 MG Tablet Delayed Response Take 1 Tab by mouth.     • Cholecalciferol (VITAMIN D) 2000 units Cap Take 1 Cap by mouth every day.     • Multiple Vitamins-Minerals (OCUVITE ADULT 50+) Cap Take 1 Each by mouth.       No current facility-administered medications for this visit.         Objective:   Vitals obtained by patient:  Vitals:       Physical Exam:  Constitutional: Alert, no distress, well-groomed.  Psych: Alert and oriented x3, normal affect and mood.     Assessment and Plan:   1. Essential hypertension          Blood pressure has been doing well.  Continue current regimen.  No other concerns.      Return to clinic in 1 year or prn.    Heike Azul MD, [facility]  Cardiologist  [facility]        Korey Onofre M.D.  [address] [facility]  [place] [address]  VIA In Basket

## 2020-06-15 DIAGNOSIS — E78.5 DYSLIPIDEMIA: ICD-10-CM

## 2020-06-15 RX ORDER — SIMVASTATIN 20 MG
TABLET ORAL
Qty: 90 TAB | Refills: 3 | Status: SHIPPED | OUTPATIENT
Start: 2020-06-15 | End: 2021-06-17

## 2020-08-25 DIAGNOSIS — E78.2 MIXED HYPERLIPIDEMIA: ICD-10-CM

## 2020-08-25 RX ORDER — EZETIMIBE 10 MG/1
TABLET ORAL
Qty: 90 TAB | Refills: 3 | Status: SHIPPED | OUTPATIENT
Start: 2020-08-25 | End: 2022-02-02

## 2020-09-11 ENCOUNTER — NON-PROVIDER VISIT (OUTPATIENT)
Dept: INTERNAL MEDICINE | Facility: IMAGING CENTER | Age: 70
End: 2020-09-11
Payer: COMMERCIAL

## 2020-09-11 ENCOUNTER — HOSPITAL ENCOUNTER (OUTPATIENT)
Facility: MEDICAL CENTER | Age: 70
End: 2020-09-11
Attending: INTERNAL MEDICINE
Payer: COMMERCIAL

## 2020-09-11 DIAGNOSIS — Z79.899 LONG TERM USE OF DRUG: ICD-10-CM

## 2020-09-11 DIAGNOSIS — Z01.89 ENCOUNTER FOR ROUTINE LABORATORY TESTING: ICD-10-CM

## 2020-09-11 DIAGNOSIS — R74.8 ELEVATED CPK: ICD-10-CM

## 2020-09-11 DIAGNOSIS — R73.03 PREDIABETES: ICD-10-CM

## 2020-09-11 DIAGNOSIS — E53.8 B12 DEFICIENCY: ICD-10-CM

## 2020-09-11 DIAGNOSIS — E78.2 MIXED HYPERLIPIDEMIA WITH APOLIPOPROTEIN E4 VARIANT: ICD-10-CM

## 2020-09-11 LAB
ALBUMIN SERPL BCP-MCNC: 4 G/DL (ref 3.2–4.9)
ALBUMIN/GLOB SERPL: 1.3 G/DL
ALP SERPL-CCNC: 57 U/L (ref 30–99)
ALT SERPL-CCNC: 18 U/L (ref 2–50)
ANION GAP SERPL CALC-SCNC: 10 MMOL/L (ref 7–16)
AST SERPL-CCNC: 21 U/L (ref 12–45)
BILIRUB SERPL-MCNC: 0.5 MG/DL (ref 0.1–1.5)
BUN SERPL-MCNC: 22 MG/DL (ref 8–22)
CALCIUM SERPL-MCNC: 8.9 MG/DL (ref 8.5–10.5)
CHLORIDE SERPL-SCNC: 105 MMOL/L (ref 96–112)
CK SERPL-CCNC: 219 U/L (ref 0–154)
CO2 SERPL-SCNC: 27 MMOL/L (ref 20–33)
CREAT SERPL-MCNC: 1.16 MG/DL (ref 0.5–1.4)
EST. AVERAGE GLUCOSE BLD GHB EST-MCNC: 120 MG/DL
GLOBULIN SER CALC-MCNC: 3.2 G/DL (ref 1.9–3.5)
GLUCOSE SERPL-MCNC: 94 MG/DL (ref 65–99)
HBA1C MFR BLD: 5.8 % (ref 0–5.6)
POTASSIUM SERPL-SCNC: 4.2 MMOL/L (ref 3.6–5.5)
PROT SERPL-MCNC: 7.2 G/DL (ref 6–8.2)
SODIUM SERPL-SCNC: 142 MMOL/L (ref 135–145)
VIT B12 SERPL-MCNC: 763 PG/ML (ref 211–911)

## 2020-09-11 PROCEDURE — 82550 ASSAY OF CK (CPK): CPT

## 2020-09-11 PROCEDURE — 82607 VITAMIN B-12: CPT

## 2020-09-11 PROCEDURE — 83036 HEMOGLOBIN GLYCOSYLATED A1C: CPT

## 2020-09-11 PROCEDURE — 83704 LIPOPROTEIN BLD QUAN PART: CPT

## 2020-09-11 PROCEDURE — 80053 COMPREHEN METABOLIC PANEL: CPT

## 2020-09-11 PROCEDURE — 80061 LIPID PANEL: CPT

## 2020-09-17 LAB
CHOLEST SERPL-MCNC: 108 MG/DL
HDL PARTICAL NO Q4363: 26.4 UMOL/L
HDL SIZE Q4361: 9.2 NM
HDLC SERPL-MCNC: 51 MG/DL (ref 40–59)
HLD.LARGE SERPL-SCNC: 6.6 UMOL/L
L VLDL PART NO Q4357: <1.5 NMOL/L
LDL SERPL QN: 20.7 NM
LDL SERPL-SCNC: 824 NMOL/L
LDL SMALL SERPL-SCNC: 382 NMOL/L
LDLC SERPL CALC-MCNC: 47 MG/DL
PATHOLOGY STUDY: ABNORMAL
TRIGL SERPL-MCNC: 52 MG/DL (ref 30–149)
VLDL SIZE Q4362: 46.2 NM

## 2020-09-28 ENCOUNTER — OFFICE VISIT (OUTPATIENT)
Dept: INTERNAL MEDICINE | Facility: IMAGING CENTER | Age: 70
End: 2020-09-28
Payer: COMMERCIAL

## 2020-09-28 VITALS
SYSTOLIC BLOOD PRESSURE: 90 MMHG | DIASTOLIC BLOOD PRESSURE: 60 MMHG | BODY MASS INDEX: 25.06 KG/M2 | OXYGEN SATURATION: 94 % | TEMPERATURE: 98.7 F | HEIGHT: 71 IN | WEIGHT: 179 LBS | HEART RATE: 68 BPM | RESPIRATION RATE: 14 BRPM

## 2020-09-28 DIAGNOSIS — Z79.899 LONG TERM USE OF DRUG: ICD-10-CM

## 2020-09-28 DIAGNOSIS — Z12.5 PROSTATE CANCER SCREENING: ICD-10-CM

## 2020-09-28 DIAGNOSIS — R74.8 ELEVATED CPK: ICD-10-CM

## 2020-09-28 DIAGNOSIS — R73.03 PREDIABETES: ICD-10-CM

## 2020-09-28 DIAGNOSIS — I10 ESSENTIAL HYPERTENSION: ICD-10-CM

## 2020-09-28 DIAGNOSIS — Z86.39 HISTORY OF VITAMIN D DEFICIENCY: ICD-10-CM

## 2020-09-28 DIAGNOSIS — Z86.39 HISTORY OF NON ANEMIC VITAMIN B12 DEFICIENCY: ICD-10-CM

## 2020-09-28 DIAGNOSIS — Z23 NEED FOR INFLUENZA VACCINATION: ICD-10-CM

## 2020-09-28 DIAGNOSIS — E78.00 HYPERCHOLESTEROLEMIA: ICD-10-CM

## 2020-09-28 PROBLEM — R53.81 PHYSICAL DECONDITIONING: Status: RESOLVED | Noted: 2018-02-08 | Resolved: 2020-09-28

## 2020-09-28 PROCEDURE — 99214 OFFICE O/P EST MOD 30 MIN: CPT | Mod: 25 | Performed by: INTERNAL MEDICINE

## 2020-09-28 PROCEDURE — 90471 IMMUNIZATION ADMIN: CPT | Performed by: INTERNAL MEDICINE

## 2020-09-28 PROCEDURE — 90662 IIV NO PRSV INCREASED AG IM: CPT | Performed by: INTERNAL MEDICINE

## 2020-09-28 RX ORDER — VITAMIN B COMPLEX
1000 TABLET ORAL DAILY
COMMUNITY
End: 2022-12-07

## 2020-09-28 RX ORDER — UBIDECARENONE 200 MG
1 CAPSULE ORAL
Qty: 100 CAP | Refills: 3 | COMMUNITY
Start: 2020-09-28 | End: 2022-12-07

## 2020-09-28 ASSESSMENT — FIBROSIS 4 INDEX: FIB4 SCORE: 1.71

## 2020-09-28 ASSESSMENT — ENCOUNTER SYMPTOMS
DIZZINESS: 0
MYALGIAS: 0

## 2020-09-28 ASSESSMENT — PATIENT HEALTH QUESTIONNAIRE - PHQ9: CLINICAL INTERPRETATION OF PHQ2 SCORE: 0

## 2020-09-28 NOTE — PROGRESS NOTES
"Established Patient Note   HPI:        Mani arrives today to follow up HTN, prediabetes and hyperlipidemia; he has done recent lab work. He monitors BP at home and states systolic ranges 105-120.    Past Medical History:   Diagnosis Date   • Agatston CAC score 200-399 2018    CAC 2018: LMA 0, LCX 63.5, .1, RCA 13.4, PDA 0 (total 377.7)   • Anxiety     on Lexapro since    • B12 deficiency 2018   • Carotid atherosclerosis, bilateral 2019   • Headache     \"life long\" per pt   • History of colon polyps 2019   • Hyperlipidemia    • Hypertension    • Vertigo        Current Outpatient Medications   Medication Sig Dispense Refill   • vitamin D (CHOLECALCIFEROL) 1000 Unit (25 mcg) Tab Take 1,000 Units by mouth every day.     • Coenzyme Q10 200 MG Cap Take 1 Cap by mouth every day. 100 Cap 3   • ezetimibe (ZETIA) 10 MG Tab TAKE ONE TABLET BY MOUTH EVERY DAY 90 Tab 3   • simvastatin (ZOCOR) 20 MG Tab TAKE ONE TABLET BY MOUTH EVERY DAY 90 Tab 3   • B Complex Vitamins (B COMPLEX 1 PO)      • metFORMIN ER (GLUCOPHAGE XR) 500 MG TABLET SR 24 HR Take 1 Tab by mouth every day. 90 Tab 3   • irbesartan-hydrochlorothiazide (AVALIDE) 150-12.5 MG per tablet Take 1 Tab by mouth every day. 90 Tab 3   • escitalopram (LEXAPRO) 10 MG Tab Take 1.5 tablets once a day (Patient taking differently: 10 mg. Take 1.5 tablets once a day) 135 Tab 3   • aspirin EC (ECOTRIN) 81 MG Tablet Delayed Response Take 1 Tab by mouth.     • Multiple Vitamins-Minerals (OCUVITE ADULT 50+) Cap Take 1 Each by mouth.       No current facility-administered medications for this visit.          No Known Allergies      Social History     Tobacco Use   • Smoking status: Former Smoker     Packs/day: 1.00     Years: 2.00     Pack years: 2.00     Types: Cigarettes     Start date: 1957     Quit date: 1959     Years since quittin.8   • Smokeless tobacco: Never Used   Substance Use Topics   • Alcohol use: Yes     Alcohol/week: " "4.2 oz     Types: 7 Cans of beer per week   • Drug use: No       Past Surgical History:   Procedure Laterality Date   • HERNIA REPAIR  2006        Review of Systems   Musculoskeletal: Negative for myalgias.        No muscle weakness   Neurological: Negative for dizziness.       Ambulatory Vitals  BP (!) 90/60 (BP Location: Left arm, Patient Position: Sitting, BP Cuff Size: Adult)   Pulse 68   Temp 37.1 °C (98.7 °F) (Temporal)   Resp 14   Ht 1.803 m (5' 11\")   Wt 81.2 kg (179 lb)   SpO2 94%   BMI 24.97 kg/m²     Physical Exam   Constitutional: He is well-developed, well-nourished, and in no distress. No distress.   Cardiovascular: Normal rate, regular rhythm and normal heart sounds.   Pulmonary/Chest: Effort normal and breath sounds normal. He has no wheezes. He has no rales.   Musculoskeletal:         General: No edema.   Neurological: He is alert. No cranial nerve deficit. Gait normal. Coordination normal.   Skin: He is not diaphoretic.       Component      Latest Ref Rng & Units 1/21/2020 9/11/2020   WBC      4.8 - 10.8 K/uL 5.6    RBC      4.70 - 6.10 M/uL 4.77    Hemoglobin      14.0 - 18.0 g/dL 14.1    Hematocrit      42.0 - 52.0 % 45.1    MCV      81.4 - 97.8 fL 94.5    MCH      27.0 - 33.0 pg 29.6    MCHC      33.7 - 35.3 g/dL 31.3 (L)    RDW      35.9 - 50.0 fL 42.0    Platelet Count      164 - 446 K/uL 206    MPV      9.0 - 12.9 fL 10.3    Neutrophils-Polys      44.00 - 72.00 % 55.90    Lymphocytes      22.00 - 41.00 % 27.90    Monocytes      0.00 - 13.40 % 6.40    Eosinophils      0.00 - 6.90 % 8.70 (H)    Basophils      0.00 - 1.80 % 0.70    Immature Granulocytes      0.00 - 0.90 % 0.40    Nucleated RBC      /100 WBC 0.00    Neutrophils (Absolute)      1.82 - 7.42 K/uL 3.14    Lymphs (Absolute)      1.00 - 4.80 K/uL 1.57    Monos (Absolute)      0.00 - 0.85 K/uL 0.36    Eos (Absolute)      0.00 - 0.51 K/uL 0.49    Baso (Absolute)      0.00 - 0.12 K/uL 0.04    Immature Granulocytes (abs)      0.00 - " 0.11 K/uL 0.02    NRBC (Absolute)      K/uL 0.00    Sodium      135 - 145 mmol/L 140 142   Potassium      3.6 - 5.5 mmol/L 3.7 4.2   Chloride      96 - 112 mmol/L 105 105   Co2      20 - 33 mmol/L 30 27   Anion Gap      7.0 - 16.0 5.0 10.0   Glucose      65 - 99 mg/dL 87 94   Bun      8 - 22 mg/dL 24 (H) 22   Creatinine      0.50 - 1.40 mg/dL 1.24 1.16   Calcium      8.5 - 10.5 mg/dL 9.1 8.9   AST(SGOT)      12 - 45 U/L 23 21   ALT(SGPT)      2 - 50 U/L 19 18   Alkaline Phosphatase      30 - 99 U/L 54 57   Total Bilirubin      0.1 - 1.5 mg/dL 0.8 0.5   Albumin      3.2 - 4.9 g/dL 4.2 4.0   Total Protein      6.0 - 8.2 g/dL 7.8 7.2   Globulin      1.9 - 3.5 g/dL 3.6 (H) 3.2   A-G Ratio      g/dL 1.2 1.3   Color       Yellow    Character       Clear    Specific Gravity      <1.035 1.027    Ph      5.0 - 8.0 5.0    Glucose      Negative mg/dL Negative    Ketones      Negative mg/dL Negative    Protein      Negative mg/dL Negative    Bilirubin      Negative Negative    Urobilinogen, Urine      Negative 0.2    Nitrite      Negative Negative    Leukocyte Esterase      Negative Negative    Occult Blood      Negative Negative    Micro Urine Req       see below    Cholesterol,Tot      <=199 mg/dL 105 108   Triglycerides      30 - 149 mg/dL 68 52   HDL      40 - 59 mg/dL 52 51   LDL Cholesterol      <=129 mg/dL 39 47   LDL Particle      <=1135 nmol/L 747 824   Small LDL      <=634 nmol/L 224 382   L-VLDL Particle No.      <=2.7 nmol/L <1.5 <1.5   HDL Particle No.      >=33.0 umol/L 25.5 (L) 26.4 (L)   L-HDL Particle No.      >=4.2 umol/L 6.9 6.6   LDL Particle Size      >=20.7 nm 20.8 20.7   VLDL Size      <=46.7 nm 44.5 46.2   HDL Size      >=8.9 nm 9.3 9.2   EER LipoFit by NMR       See Note See Note   Iron      50 - 180 ug/dL 108    Total Iron Binding      250 - 450 ug/dL 332    % Saturation      15 - 55 % 33    Glycohemoglobin      0.0 - 5.6 % 5.8 (H) 5.8 (H)   Estim. Avg Glu      mg/dL 120 120   GFR If        >60 mL/min/1.73 m 2 >60 >60   GFR If Non African American      >60 mL/min/1.73 m 2 58 (A) >60   Vitamin B12 -True Cobalamin      211 - 911 pg/mL 294 763   Ferritin      22.0 - 322.0 ng/mL 85.4    Prostatic Specific Antigen Tot      0.00 - 4.00 ng/mL 0.98    25-Hydroxy   Vitamin D 25      30 - 100 ng/mL 40    CPK Total      0 - 154 U/L 175 (H) 219 (H)   TSH      0.380 - 5.330 uIU/mL 2.280      Assessment and Plan:     1. Essential hypertension  Comp Metabolic Panel    TSH    controlled   2. Hypercholesterolemia  Coenzyme Q10 200 MG Cap    LipoFit by NMR    TSH   3. Elevated CPK  Coenzyme Q10 200 MG Cap    CREATINE KINASE   4. Prediabetes  HEMOGLOBIN A1C    URINALYSIS    Comp Metabolic Panel    TSH   5. History of non anemic vitamin B12 deficiency  VITAMIN B12    CBC WITH DIFFERENTIAL   6. History of vitamin D deficiency  VITAMIN D,25 HYDROXY   7. Prostate cancer screening  PROSTATE SPECIFIC AG SCREENING   8. Long term use of drug  CBC WITH DIFFERENTIAL    Comp Metabolic Panel     Recommend he start taking coenzyme Q10 200-300 mg qd along with his statin.  Face to face time: 30 minutes with greater than 50% of time spent with direct patient contact and medical management.     Korey Onofre M.D.

## 2021-01-03 DIAGNOSIS — F41.1 GENERALIZED ANXIETY DISORDER: ICD-10-CM

## 2021-01-04 RX ORDER — ESCITALOPRAM OXALATE 10 MG/1
TABLET ORAL
Qty: 135 TAB | Refills: 3 | Status: SHIPPED | OUTPATIENT
Start: 2021-01-04 | End: 2022-02-18

## 2021-01-15 DIAGNOSIS — Z23 NEED FOR VACCINATION: ICD-10-CM

## 2021-01-28 DIAGNOSIS — I10 ESSENTIAL HYPERTENSION: ICD-10-CM

## 2021-01-29 RX ORDER — IRBESARTAN AND HYDROCHLOROTHIAZIDE 150; 12.5 MG/1; MG/1
TABLET, FILM COATED ORAL
Qty: 90 TAB | Refills: 3 | Status: SHIPPED | OUTPATIENT
Start: 2021-01-29 | End: 2022-02-02

## 2021-02-06 DIAGNOSIS — R73.03 PREDIABETES: ICD-10-CM

## 2021-02-08 RX ORDER — METFORMIN HYDROCHLORIDE 500 MG/1
TABLET, EXTENDED RELEASE ORAL
Qty: 90 TAB | Refills: 3 | Status: SHIPPED | OUTPATIENT
Start: 2021-02-08 | End: 2022-02-08

## 2021-03-26 ENCOUNTER — HOSPITAL ENCOUNTER (OUTPATIENT)
Facility: MEDICAL CENTER | Age: 71
End: 2021-03-26
Attending: INTERNAL MEDICINE
Payer: COMMERCIAL

## 2021-03-26 ENCOUNTER — NON-PROVIDER VISIT (OUTPATIENT)
Dept: INTERNAL MEDICINE | Facility: IMAGING CENTER | Age: 71
End: 2021-03-26
Payer: COMMERCIAL

## 2021-03-26 DIAGNOSIS — Z12.5 PROSTATE CANCER SCREENING: ICD-10-CM

## 2021-03-26 DIAGNOSIS — Z79.899 LONG TERM USE OF DRUG: ICD-10-CM

## 2021-03-26 DIAGNOSIS — R73.03 PREDIABETES: ICD-10-CM

## 2021-03-26 DIAGNOSIS — Z01.89 ENCOUNTER FOR ROUTINE LABORATORY TESTING: ICD-10-CM

## 2021-03-26 DIAGNOSIS — R74.8 ELEVATED CPK: ICD-10-CM

## 2021-03-26 DIAGNOSIS — I10 ESSENTIAL HYPERTENSION: ICD-10-CM

## 2021-03-26 DIAGNOSIS — Z86.39 HISTORY OF VITAMIN D DEFICIENCY: ICD-10-CM

## 2021-03-26 DIAGNOSIS — Z86.39 HISTORY OF NON ANEMIC VITAMIN B12 DEFICIENCY: ICD-10-CM

## 2021-03-26 DIAGNOSIS — E78.00 HYPERCHOLESTEROLEMIA: ICD-10-CM

## 2021-03-26 LAB
ALBUMIN SERPL BCP-MCNC: 3.9 G/DL (ref 3.2–4.9)
ALBUMIN/GLOB SERPL: 1.1 G/DL
ALP SERPL-CCNC: 61 U/L (ref 30–99)
ALT SERPL-CCNC: 22 U/L (ref 2–50)
ANION GAP SERPL CALC-SCNC: 8 MMOL/L (ref 7–16)
AST SERPL-CCNC: 26 U/L (ref 12–45)
BASOPHILS # BLD AUTO: 0.4 % (ref 0–1.8)
BASOPHILS # BLD: 0.02 K/UL (ref 0–0.12)
BILIRUB SERPL-MCNC: 0.6 MG/DL (ref 0.1–1.5)
BUN SERPL-MCNC: 23 MG/DL (ref 8–22)
CALCIUM SERPL-MCNC: 9 MG/DL (ref 8.5–10.5)
CHLORIDE SERPL-SCNC: 101 MMOL/L (ref 96–112)
CK SERPL-CCNC: 211 U/L (ref 0–154)
CO2 SERPL-SCNC: 29 MMOL/L (ref 20–33)
CREAT SERPL-MCNC: 1.07 MG/DL (ref 0.5–1.4)
EOSINOPHIL # BLD AUTO: 0.35 K/UL (ref 0–0.51)
EOSINOPHIL NFR BLD: 7.2 % (ref 0–6.9)
ERYTHROCYTE [DISTWIDTH] IN BLOOD BY AUTOMATED COUNT: 42.1 FL (ref 35.9–50)
EST. AVERAGE GLUCOSE BLD GHB EST-MCNC: 126 MG/DL
GLOBULIN SER CALC-MCNC: 3.5 G/DL (ref 1.9–3.5)
GLUCOSE SERPL-MCNC: 92 MG/DL (ref 65–99)
HBA1C MFR BLD: 6 % (ref 4–5.6)
HCT VFR BLD AUTO: 43.2 % (ref 42–52)
HGB BLD-MCNC: 13.6 G/DL (ref 14–18)
IMM GRANULOCYTES # BLD AUTO: 0.02 K/UL (ref 0–0.11)
IMM GRANULOCYTES NFR BLD AUTO: 0.4 % (ref 0–0.9)
LYMPHOCYTES # BLD AUTO: 1.32 K/UL (ref 1–4.8)
LYMPHOCYTES NFR BLD: 27.2 % (ref 22–41)
MCH RBC QN AUTO: 29.3 PG (ref 27–33)
MCHC RBC AUTO-ENTMCNC: 31.5 G/DL (ref 33.7–35.3)
MCV RBC AUTO: 93.1 FL (ref 81.4–97.8)
MONOCYTES # BLD AUTO: 0.41 K/UL (ref 0–0.85)
MONOCYTES NFR BLD AUTO: 8.5 % (ref 0–13.4)
NEUTROPHILS # BLD AUTO: 2.73 K/UL (ref 1.82–7.42)
NEUTROPHILS NFR BLD: 56.3 % (ref 44–72)
NRBC # BLD AUTO: 0 K/UL
NRBC BLD-RTO: 0 /100 WBC
PLATELET # BLD AUTO: 229 K/UL (ref 164–446)
PMV BLD AUTO: 10.6 FL (ref 9–12.9)
POTASSIUM SERPL-SCNC: 3.8 MMOL/L (ref 3.6–5.5)
PROT SERPL-MCNC: 7.4 G/DL (ref 6–8.2)
PSA SERPL-MCNC: 0.77 NG/ML (ref 0–4)
RBC # BLD AUTO: 4.64 M/UL (ref 4.7–6.1)
SODIUM SERPL-SCNC: 138 MMOL/L (ref 135–145)
TSH SERPL DL<=0.005 MIU/L-ACNC: 2.62 UIU/ML (ref 0.38–5.33)
VIT B12 SERPL-MCNC: 437 PG/ML (ref 211–911)
WBC # BLD AUTO: 4.9 K/UL (ref 4.8–10.8)

## 2021-03-26 PROCEDURE — 84443 ASSAY THYROID STIM HORMONE: CPT

## 2021-03-26 PROCEDURE — 82550 ASSAY OF CK (CPK): CPT

## 2021-03-26 PROCEDURE — 82607 VITAMIN B-12: CPT

## 2021-03-26 PROCEDURE — 85025 COMPLETE CBC W/AUTO DIFF WBC: CPT

## 2021-03-26 PROCEDURE — 83704 LIPOPROTEIN BLD QUAN PART: CPT

## 2021-03-26 PROCEDURE — 84153 ASSAY OF PSA TOTAL: CPT

## 2021-03-26 PROCEDURE — 82306 VITAMIN D 25 HYDROXY: CPT

## 2021-03-26 PROCEDURE — 80053 COMPREHEN METABOLIC PANEL: CPT

## 2021-03-26 PROCEDURE — 80061 LIPID PANEL: CPT

## 2021-03-26 PROCEDURE — 83036 HEMOGLOBIN GLYCOSYLATED A1C: CPT

## 2021-03-29 LAB
CHOLEST SERPL-MCNC: 113 MG/DL
HDL PARTICAL NO Q4363: 25.6 UMOL/L
HDL SIZE Q4361: 9.2 NM
HDLC SERPL-MCNC: 52 MG/DL (ref 40–59)
HLD.LARGE SERPL-SCNC: 6.6 UMOL/L
L VLDL PART NO Q4357: <1.5 NMOL/L
LDL SERPL QN: 20.7 NM
LDL SERPL-SCNC: 806 NMOL/L
LDL SMALL SERPL-SCNC: 353 NMOL/L
LDLC SERPL CALC-MCNC: 48 MG/DL
PATHOLOGY STUDY: ABNORMAL
TRIGL SERPL-MCNC: 65 MG/DL (ref 30–149)
VLDL SIZE Q4362: 44.9 NM

## 2021-03-30 LAB — 25(OH)D3 SERPL-MCNC: 45 NG/ML (ref 30–100)

## 2021-04-09 ENCOUNTER — HOSPITAL ENCOUNTER (OUTPATIENT)
Facility: MEDICAL CENTER | Age: 71
End: 2021-04-09
Attending: INTERNAL MEDICINE
Payer: COMMERCIAL

## 2021-04-09 ENCOUNTER — OFFICE VISIT (OUTPATIENT)
Dept: INTERNAL MEDICINE | Facility: IMAGING CENTER | Age: 71
End: 2021-04-09
Payer: COMMERCIAL

## 2021-04-09 VITALS
HEIGHT: 71 IN | WEIGHT: 183 LBS | BODY MASS INDEX: 25.62 KG/M2 | SYSTOLIC BLOOD PRESSURE: 102 MMHG | OXYGEN SATURATION: 95 % | RESPIRATION RATE: 14 BRPM | DIASTOLIC BLOOD PRESSURE: 60 MMHG | TEMPERATURE: 98.6 F | HEART RATE: 75 BPM

## 2021-04-09 DIAGNOSIS — Z86.39 HISTORY OF VITAMIN D DEFICIENCY: ICD-10-CM

## 2021-04-09 DIAGNOSIS — R74.8 ELEVATED CPK: ICD-10-CM

## 2021-04-09 DIAGNOSIS — Z79.899 LONG TERM USE OF DRUG: ICD-10-CM

## 2021-04-09 DIAGNOSIS — R93.1 AGATSTON CAC SCORE 200-399: ICD-10-CM

## 2021-04-09 DIAGNOSIS — R73.03 PREDIABETES: ICD-10-CM

## 2021-04-09 DIAGNOSIS — E78.00 HYPERCHOLESTEROLEMIA: ICD-10-CM

## 2021-04-09 DIAGNOSIS — G89.29 CHRONIC PAIN OF LEFT KNEE: ICD-10-CM

## 2021-04-09 DIAGNOSIS — Z86.39 HISTORY OF NON ANEMIC VITAMIN B12 DEFICIENCY: ICD-10-CM

## 2021-04-09 DIAGNOSIS — D64.9 ANEMIA, MILD: ICD-10-CM

## 2021-04-09 DIAGNOSIS — I10 ESSENTIAL HYPERTENSION: ICD-10-CM

## 2021-04-09 DIAGNOSIS — Z00.00 WELLNESS EXAMINATION: ICD-10-CM

## 2021-04-09 DIAGNOSIS — M25.562 CHRONIC PAIN OF LEFT KNEE: ICD-10-CM

## 2021-04-09 DIAGNOSIS — Z23 NEED FOR SHINGLES VACCINE: ICD-10-CM

## 2021-04-09 LAB
APPEARANCE UR: CLEAR
BILIRUB UR QL STRIP.AUTO: ABNORMAL
COLOR UR: ABNORMAL
GLUCOSE UR STRIP.AUTO-MCNC: NEGATIVE MG/DL
KETONES UR STRIP.AUTO-MCNC: ABNORMAL MG/DL
LEUKOCYTE ESTERASE UR QL STRIP.AUTO: NEGATIVE
MICRO URNS: ABNORMAL
NITRITE UR QL STRIP.AUTO: NEGATIVE
PH UR STRIP.AUTO: 5 [PH] (ref 5–8)
PROT UR QL STRIP: NEGATIVE MG/DL
RBC UR QL AUTO: NEGATIVE
SP GR UR STRIP.AUTO: 1.02
UROBILINOGEN UR STRIP.AUTO-MCNC: 1 MG/DL

## 2021-04-09 PROCEDURE — 99397 PER PM REEVAL EST PAT 65+ YR: CPT | Mod: 25 | Performed by: INTERNAL MEDICINE

## 2021-04-09 PROCEDURE — 90471 IMMUNIZATION ADMIN: CPT | Performed by: INTERNAL MEDICINE

## 2021-04-09 PROCEDURE — 81003 URINALYSIS AUTO W/O SCOPE: CPT

## 2021-04-09 PROCEDURE — 90750 HZV VACC RECOMBINANT IM: CPT | Performed by: INTERNAL MEDICINE

## 2021-04-09 ASSESSMENT — FIBROSIS 4 INDEX: FIB4 SCORE: 1.72

## 2021-04-09 NOTE — PROGRESS NOTES
"Established Patient Note   HPI:        Mani is here today for yearly exam. He states he does have documented arthritis in left knee that is now bothering him going down steps or hill.     Past Medical History:   Diagnosis Date   • Agatston CAC score 200-399 2018    CAC 2018: LMA 0, LCX 63.5, .1, RCA 13.4, PDA 0 (total 377.7)   • Anxiety     on Lexapro since    • B12 deficiency 2018   • Carotid atherosclerosis, bilateral 2019   • Headache     \"life long\" per pt   • History of colon polyps 2019   • Hyperlipidemia    • Hypertension    • Vertigo        Current Outpatient Medications   Medication Sig Dispense Refill   • metFORMIN ER (GLUCOPHAGE XR) 500 MG TABLET SR 24 HR TAKE ONE TABLET BY MOUTH EVERY DAY 90 Tab 3   • irbesartan-hydrochlorothiazide (AVALIDE) 150-12.5 MG per tablet TAKE ONE TABLET BY MOUTH EVERY DAY 90 Tab 3   • escitalopram (LEXAPRO) 10 MG Tab TAKE 1&1/2 TABLETS BY MOUTH ONCE DAILY (Patient taking differently: 10 mg.) 135 Tab 3   • vitamin D (CHOLECALCIFEROL) 1000 Unit (25 mcg) Tab Take 1,000 Units by mouth every day.     • Coenzyme Q10 200 MG Cap Take 1 Cap by mouth every day. 100 Cap 3   • ezetimibe (ZETIA) 10 MG Tab TAKE ONE TABLET BY MOUTH EVERY DAY (Patient taking differently: 5 mg.) 90 Tab 3   • simvastatin (ZOCOR) 20 MG Tab TAKE ONE TABLET BY MOUTH EVERY DAY 90 Tab 3   • B Complex Vitamins (B COMPLEX 1 PO)      • aspirin EC (ECOTRIN) 81 MG Tablet Delayed Response Take 1 Tab by mouth.     • Multiple Vitamins-Minerals (OCUVITE ADULT 50+) Cap Take 1 Each by mouth.       No current facility-administered medications for this visit.         No Known Allergies      Social History     Tobacco Use   • Smoking status: Former Smoker     Packs/day: 1.00     Years: 2.00     Pack years: 2.00     Types: Cigarettes     Start date: 1957     Quit date: 1959     Years since quittin.3   • Smokeless tobacco: Never Used   Substance Use Topics   • Alcohol use: Yes    " " Alcohol/week: 4.2 oz     Types: 7 Cans of beer per week   • Drug use: No       Past Surgical History:   Procedure Laterality Date   • HERNIA REPAIR  2006        ROS    Ambulatory Vitals  /60 (BP Location: Left arm, Patient Position: Sitting, BP Cuff Size: Adult)   Pulse 75   Temp 37 °C (98.6 °F) (Temporal)   Resp 14   Ht 1.803 m (5' 11\")   Wt 83 kg (183 lb)   SpO2 95%   BMI 25.52 kg/m²     Physical Exam   Constitutional: He is well-developed, well-nourished, and in no distress. No distress.   Cardiovascular: Normal rate, regular rhythm and normal heart sounds. Exam reveals no gallop and no friction rub.   No murmur heard.  No carotid bruits bilaterally.   Pulmonary/Chest: Effort normal and breath sounds normal. He has no wheezes. He has no rales.   Abdominal: Soft. He exhibits no distension and no mass. There is no abdominal tenderness.   Genitourinary:    Genitourinary Comments: Prostate without nodules.     Musculoskeletal:         General: No edema.   Neurological: He is alert. No cranial nerve deficit. Gait normal. Coordination normal.   Skin: He is not diaphoretic.       Component      Latest Ref Rng & Units 1/21/2020 9/11/2020 3/26/2021   WBC      4.8 - 10.8 K/uL 5.6  4.9   RBC      4.70 - 6.10 M/uL 4.77  4.64 (L)   Hemoglobin      14.0 - 18.0 g/dL 14.1  13.6 (L)   Hematocrit      42.0 - 52.0 % 45.1  43.2   MCV      81.4 - 97.8 fL 94.5  93.1   MCH      27.0 - 33.0 pg 29.6  29.3   MCHC      33.7 - 35.3 g/dL 31.3 (L)  31.5 (L)   RDW      35.9 - 50.0 fL 42.0  42.1   Platelet Count      164 - 446 K/uL 206  229   MPV      9.0 - 12.9 fL 10.3  10.6   Neutrophils-Polys      44.00 - 72.00 % 55.90  56.30   Lymphocytes      22.00 - 41.00 % 27.90  27.20   Monocytes      0.00 - 13.40 % 6.40  8.50   Eosinophils      0.00 - 6.90 % 8.70 (H)  7.20 (H)   Basophils      0.00 - 1.80 % 0.70  0.40   Immature Granulocytes      0.00 - 0.90 % 0.40  0.40   Nucleated RBC      /100 WBC 0.00  0.00   Neutrophils (Absolute)    "   1.82 - 7.42 K/uL 3.14  2.73   Lymphs (Absolute)      1.00 - 4.80 K/uL 1.57  1.32   Monos (Absolute)      0.00 - 0.85 K/uL 0.36  0.41   Eos (Absolute)      0.00 - 0.51 K/uL 0.49  0.35   Baso (Absolute)      0.00 - 0.12 K/uL 0.04  0.02   Immature Granulocytes (abs)      0.00 - 0.11 K/uL 0.02  0.02   NRBC (Absolute)      K/uL 0.00  0.00   Sodium      135 - 145 mmol/L 140 142 138   Potassium      3.6 - 5.5 mmol/L 3.7 4.2 3.8   Chloride      96 - 112 mmol/L 105 105 101   Co2      20 - 33 mmol/L 30 27 29   Anion Gap      7.0 - 16.0 5.0 10.0 8.0   Glucose      65 - 99 mg/dL 87 94 92   Bun      8 - 22 mg/dL 24 (H) 22 23 (H)   Creatinine      0.50 - 1.40 mg/dL 1.24 1.16 1.07   Calcium      8.5 - 10.5 mg/dL 9.1 8.9 9.0   AST(SGOT)      12 - 45 U/L 23 21 26   ALT(SGPT)      2 - 50 U/L 19 18 22   Alkaline Phosphatase      30 - 99 U/L 54 57 61   Total Bilirubin      0.1 - 1.5 mg/dL 0.8 0.5 0.6   Albumin      3.2 - 4.9 g/dL 4.2 4.0 3.9   Total Protein      6.0 - 8.2 g/dL 7.8 7.2 7.4   Globulin      1.9 - 3.5 g/dL 3.6 (H) 3.2 3.5   A-G Ratio      g/dL 1.2 1.3 1.1   Color       Yellow     Character       Clear     Specific Gravity      <1.035 1.027     Ph      5.0 - 8.0 5.0     Glucose      Negative mg/dL Negative     Ketones      Negative mg/dL Negative     Protein      Negative mg/dL Negative     Bilirubin      Negative Negative     Urobilinogen, Urine      Negative 0.2     Nitrite      Negative Negative     Leukocyte Esterase      Negative Negative     Occult Blood      Negative Negative     Micro Urine Req       see below     Cholesterol,Tot      <=199 mg/dL 105 108 113   Triglycerides      30 - 149 mg/dL 68 52 65   HDL      40 - 59 mg/dL 52 51 52   LDL Cholesterol      <=129 mg/dL 39 47 48   LDL Particle      <=1135 nmol/L 747 824 806   Small LDL      <=634 nmol/L 224 382 353   L-VLDL Particle No.      <=2.7 nmol/L <1.5 <1.5 <1.5   HDL Particle No.      >=33.0 umol/L 25.5 (L) 26.4 (L) 25.6 (L)   L-HDL Particle No.      >=4.2  umol/L 6.9 6.6 6.6   LDL Particle Size      >=20.7 nm 20.8 20.7 20.7   VLDL Size      <=46.7 nm 44.5 46.2 44.9   HDL Size      >=8.9 nm 9.3 9.2 9.2   EER LipoFit by NMR       See Note See Note See Note   Iron      50 - 180 ug/dL 108     Total Iron Binding      250 - 450 ug/dL 332     % Saturation      15 - 55 % 33     Glycohemoglobin      4.0 - 5.6 % 5.8 (H) 5.8 (H) 6.0 (H)   Estim. Avg Glu      mg/dL 120 120 126   GFR If African American      >60 mL/min/1.73 m 2 >60 >60 >60   GFR If Non African American      >60 mL/min/1.73 m 2 58 (A) >60 >60   Vitamin B12 -True Cobalamin      211 - 911 pg/mL 294 763 437   Ferritin      22.0 - 322.0 ng/mL 85.4     Prostatic Specific Antigen Tot      0.00 - 4.00 ng/mL 0.98  0.77   25-Hydroxy   Vitamin D 25      30 - 100 ng/mL 40  45   CPK Total      0 - 154 U/L 175 (H) 219 (H) 211 (H)   TSH      0.380 - 5.330 uIU/mL 2.280  2.620     Assessment and Plan:     1. Wellness examination     2. Agatston CAC score 200-399     3. Hypercholesterolemia  Lipid Profile   4. Essential hypertension  Comp Metabolic Panel   5. Prediabetes  HEMOGLOBIN A1C    Comp Metabolic Panel   6. History of non anemic vitamin B12 deficiency     7. History of vitamin D deficiency     8. Anemia, mild  CBC WITH DIFFERENTIAL    FERRITIN    IRON/TOTAL IRON BIND   9. Elevated CPK  CREATINE KINASE   10. Chronic pain of left knee      Osteoarthritis    11. Long term use of drug       Continue current medications at current doses. He has inquired about hyaluronic acid injection into knee joint; I have discussed this could help with pain for a period of time. He has seen an orthopedist in past for his knee; advised he discuss potential injection. Check CBC, CMP, iron/TIBC/%sat, ferritin, HbA1c, lipid, CPK in 6 months.    Korey Onofre M.D.

## 2021-06-07 ENCOUNTER — OFFICE VISIT (OUTPATIENT)
Dept: CARDIOLOGY | Facility: MEDICAL CENTER | Age: 71
End: 2021-06-07
Payer: COMMERCIAL

## 2021-06-07 VITALS
BODY MASS INDEX: 24.64 KG/M2 | RESPIRATION RATE: 14 BRPM | WEIGHT: 176 LBS | HEART RATE: 82 BPM | SYSTOLIC BLOOD PRESSURE: 112 MMHG | OXYGEN SATURATION: 94 % | DIASTOLIC BLOOD PRESSURE: 58 MMHG | HEIGHT: 71 IN

## 2021-06-07 DIAGNOSIS — I25.84 CORONARY ARTERY CALCIFICATION: ICD-10-CM

## 2021-06-07 DIAGNOSIS — I25.10 CORONARY ARTERY CALCIFICATION: ICD-10-CM

## 2021-06-07 DIAGNOSIS — E78.2 MIXED HYPERLIPIDEMIA: ICD-10-CM

## 2021-06-07 DIAGNOSIS — I10 ESSENTIAL HYPERTENSION: ICD-10-CM

## 2021-06-07 PROCEDURE — 99214 OFFICE O/P EST MOD 30 MIN: CPT | Performed by: INTERNAL MEDICINE

## 2021-06-07 RX ORDER — SODIUM, POTASSIUM,MAG SULFATES 17.5-3.13G
SOLUTION, RECONSTITUTED, ORAL ORAL
COMMUNITY
Start: 2021-05-17 | End: 2021-06-07

## 2021-06-07 ASSESSMENT — ENCOUNTER SYMPTOMS
DEPRESSION: 0
ORTHOPNEA: 0
PND: 0
PALPITATIONS: 0
LOSS OF CONSCIOUSNESS: 0
DIZZINESS: 0
SHORTNESS OF BREATH: 0
FALLS: 0
ABDOMINAL PAIN: 0

## 2021-06-07 ASSESSMENT — FIBROSIS 4 INDEX: FIB4 SCORE: 1.74

## 2021-06-07 NOTE — LETTER
"     [facility]   [address]   [address]  [place] [address]  Phone: [phone]  Fax: [phone]              Jose Fallon  1950    Encounter Date: 2021    Heike Azul M.D.          PROGRESS NOTE:  Chief Complaint   Patient presents with   • HTN (Controlled)   • Aortic Atherosclerosis     F/V Dx: Carotid atherosclerosis, bilateral       Subjective:   Mani Fallon is a 71 y.o. male who presents today for follow-up on coronary calcifications.    Pertinent history:  Hypertension  Hyperlipidemia  Coronary artery calcification     Patient continues to feel well overall.  He walks regularly and denies any anginal symptoms.    Past Medical History:   Diagnosis Date   • Agatston CAC score 200-399 2018    CAC 2018: LMA 0, LCX 63.5, .1, RCA 13.4, PDA 0 (total 377.7)   • Anxiety     on Lexapro since    • B12 deficiency 2018   • Carotid atherosclerosis, bilateral 2019   • Headache     \"life long\" per pt   • History of colon polyps 2019   • Hyperlipidemia    • Hypertension    • Vertigo      Past Surgical History:   Procedure Laterality Date   • HERNIA REPAIR  2006     Family History   Problem Relation Age of Onset   • Dementia Mother 70         @ 81 years   • Hypertension Father 78         @ 79 years   • Cancer Father 77        Colon Cancer   • Kidney Disease Father 78        Kidney failure was cause of death   • Breast Cancer Sister 50   • Heart Attack Paternal Grandmother 78        Fatal MI   • Heart Attack Paternal Grandfather 60        Fatal MI     Social History     Socioeconomic History   • Marital status:      Spouse name: Not on file   • Number of children: Not on file   • Years of education: Not on file   • Highest education level: Not on file   Occupational History   • Occupation:      Comment: [employer]   Tobacco Use   • Smoking status: Former Smoker     " Packs/day: 1.00     Years: 2.00     Pack years: 2.00     Types: Cigarettes     Start date: 1957     Quit date: 1959     Years since quittin.5   • Smokeless tobacco: Never Used   Substance and Sexual Activity   • Alcohol use: Yes     Alcohol/week: 4.2 oz     Types: 7 Cans of beer per week   • Drug use: No   • Sexual activity: Not Currently   Other Topics Concern   • Not on file   Social History Narrative   • Not on file     Social Determinants of Health     Financial Resource Strain:    • Difficulty of Paying Living Expenses:    Food Insecurity:    • Worried About Running Out of Food in the Last Year:    • Ran Out of Food in the Last Year:    Transportation Needs:    • Lack of Transportation (Medical):    • Lack of Transportation (Non-Medical):    Physical Activity:    • Days of Exercise per Week:    • Minutes of Exercise per Session:    Stress:    • Feeling of Stress :    Social Connections:    • Frequency of Communication with Friends and Family:    • Frequency of Social Gatherings with Friends and Family:    • Attends [employer] Services:    • Active Member of Clubs or Organizations:    • Attends Club or Organization Meetings:    • Marital Status:    Intimate Partner Violence:    • Fear of Current or Ex-Partner:    • Emotionally Abused:    • Physically Abused:    • Sexually Abused:      No Known Allergies  Outpatient Encounter Medications as of 2021   Medication Sig Dispense Refill   • metFORMIN ER (GLUCOPHAGE XR) 500 MG TABLET SR 24 HR TAKE ONE TABLET BY MOUTH EVERY DAY 90 Tab 3   • irbesartan-hydrochlorothiazide (AVALIDE) 150-12.5 MG per tablet TAKE ONE TABLET BY MOUTH EVERY DAY 90 Tab 3   • escitalopram (LEXAPRO) 10 MG Tab TAKE 1&1/2 TABLETS BY MOUTH ONCE DAILY (Patient taking differently: 10 mg.) 135 Tab 3   • vitamin D (CHOLECALCIFEROL) 1000 Unit (25 mcg) Tab Take 1,000 Units by mouth every day.     • Coenzyme Q10 200 MG Cap Take 1 Cap by mouth every day. 100 Cap 3   • ezetimibe (ZETIA) 10  "MG Tab TAKE ONE TABLET BY MOUTH EVERY DAY (Patient taking differently: 5 mg. .5 tab daily) 90 Tab 3   • simvastatin (ZOCOR) 20 MG Tab TAKE ONE TABLET BY MOUTH EVERY DAY 90 Tab 3   • B Complex Vitamins (B COMPLEX 1 PO)      • aspirin EC (ECOTRIN) 81 MG Tablet Delayed Response Take 1 Tab by mouth.     • Multiple Vitamins-Minerals (OCUVITE ADULT 50+) Cap Take 1 Each by mouth.     • [DISCONTINUED] SUPREP BOWEL PREP KIT 17.5-3.13-1.6 GM/177ML Solution  (Patient not taking: Reported on 6/7/2021)       No facility-administered encounter medications on file as of 6/7/2021.     Review of Systems   Constitutional: Negative for malaise/fatigue.   Respiratory: Negative for shortness of breath.    Cardiovascular: Negative for chest pain, palpitations, orthopnea, leg swelling and PND.   Gastrointestinal: Negative for abdominal pain.   Musculoskeletal: Negative for falls.   Neurological: Negative for dizziness and loss of consciousness.   Psychiatric/Behavioral: Negative for depression.   All other systems reviewed and are negative.       Objective:   /58 (BP Location: Left arm, Patient Position: Sitting, BP Cuff Size: Adult)   Pulse 82   Resp 14   Ht 1.803 m (5' 11\")   Wt 79.8 kg (176 lb)   SpO2 94%   BMI 24.55 kg/m²     Physical Exam   Constitutional: He is oriented to person, place, and time. He appears well-developed. No distress.   HENT:   Head: Normocephalic and atraumatic.   Eyes: Conjunctivae are normal. No scleral icterus.   Cardiovascular: Normal rate, regular rhythm and normal heart sounds. Exam reveals no gallop and no friction rub.   No murmur heard.  Pulmonary/Chest: Effort normal and breath sounds normal. No respiratory distress. He has no wheezes. He has no rales.   Musculoskeletal:         General: No swelling.      Cervical back: Normal range of motion and neck supple.   Neurological: He is alert and oriented to person, place, and time.   Skin: Skin is warm and dry. He is not diaphoretic.   "   Psychiatric: His behavior is normal. Judgment and thought content normal.   Nursing note and vitals reviewed.    CBC, Chem-7, lipid panel performed March 2021 were reviewed and showed mild anemia.  Normal renal function.  LDL 48.    Assessment:     1. Coronary artery calcification     2. Mixed hyperlipidemia     3. Essential hypertension         Medical Decision Making:  Today's Assessment / Status / Plan:     Patient has known coronary artery calcification.  He continues to be asymptomatic.  Continue aspirin.  Continue simvastatin and Zetia for his known hyperlipidemia.  Blood pressures well controlled.  Continue irbesartan hydrochlorothiazide.    Patient has a known right to left shunt on his echocardiogram suggestive of a PFO.  He has been asymptomatic.    Return to clinic in 1 year or earlier if needed.    Thank you for allowing me to participate in the care of this patient. Please do not hesitate to contact me with any questions.    Heike Azul MD, [facility]  Cardiologist  [facility]    PLEASE NOTE: This dictation was created using voice recognition software.           Korey Onofre M.D.  [address] [facility] [place] [address]  Via In Basket

## 2021-06-07 NOTE — PROGRESS NOTES
"Chief Complaint   Patient presents with   • HTN (Controlled)   • Aortic Atherosclerosis     F/V Dx: Carotid atherosclerosis, bilateral       Subjective:   Mani Fallon is a 71 y.o. male who presents today for follow-up on coronary calcifications.    Pertinent history:  Hypertension  Hyperlipidemia  Coronary artery calcification     Patient continues to feel well overall.  He walks regularly and denies any anginal symptoms.    Past Medical History:   Diagnosis Date   • Agatston CAC score 200-399 2018    CAC 2018: LMA 0, LCX 63.5, .1, RCA 13.4, PDA 0 (total 377.7)   • Anxiety     on Lexapro since    • B12 deficiency 2018   • Carotid atherosclerosis, bilateral 2019   • Headache     \"life long\" per pt   • History of colon polyps 2019   • Hyperlipidemia    • Hypertension    • Vertigo      Past Surgical History:   Procedure Laterality Date   • HERNIA REPAIR       Family History   Problem Relation Age of Onset   • Dementia Mother 70         @ 81 years   • Hypertension Father 78         @ 79 years   • Cancer Father 77        Colon Cancer   • Kidney Disease Father 78        Kidney failure was cause of death   • Breast Cancer Sister 50   • Heart Attack Paternal Grandmother 78        Fatal MI   • Heart Attack Paternal Grandfather 60        Fatal MI     Social History     Socioeconomic History   • Marital status:      Spouse name: Not on file   • Number of children: Not on file   • Years of education: Not on file   • Highest education level: Not on file   Occupational History   • Occupation:      Comment: [employer]   Tobacco Use   • Smoking status: Former Smoker     Packs/day: 1.00     Years: 2.00     Pack years: 2.00     Types: Cigarettes     Start date: 1957     Quit date: 1959     Years since quittin.5   • Smokeless tobacco: Never Used   Substance and Sexual Activity   • Alcohol use: Yes     Alcohol/week: 4.2 oz     Types: 7 Cans " of beer per week   • Drug use: No   • Sexual activity: Not Currently   Other Topics Concern   • Not on file   Social History Narrative   • Not on file     Social Determinants of Health     Financial Resource Strain:    • Difficulty of Paying Living Expenses:    Food Insecurity:    • Worried About Running Out of Food in the Last Year:    • Ran Out of Food in the Last Year:    Transportation Needs:    • Lack of Transportation (Medical):    • Lack of Transportation (Non-Medical):    Physical Activity:    • Days of Exercise per Week:    • Minutes of Exercise per Session:    Stress:    • Feeling of Stress :    Social Connections:    • Frequency of Communication with Friends and Family:    • Frequency of Social Gatherings with Friends and Family:    • Attends Buddhist Services:    • Active Member of Clubs or Organizations:    • Attends Club or Organization Meetings:    • Marital Status:    Intimate Partner Violence:    • Fear of Current or Ex-Partner:    • Emotionally Abused:    • Physically Abused:    • Sexually Abused:      No Known Allergies  Outpatient Encounter Medications as of 6/7/2021   Medication Sig Dispense Refill   • metFORMIN ER (GLUCOPHAGE XR) 500 MG TABLET SR 24 HR TAKE ONE TABLET BY MOUTH EVERY DAY 90 Tab 3   • irbesartan-hydrochlorothiazide (AVALIDE) 150-12.5 MG per tablet TAKE ONE TABLET BY MOUTH EVERY DAY 90 Tab 3   • escitalopram (LEXAPRO) 10 MG Tab TAKE 1&1/2 TABLETS BY MOUTH ONCE DAILY (Patient taking differently: 10 mg.) 135 Tab 3   • vitamin D (CHOLECALCIFEROL) 1000 Unit (25 mcg) Tab Take 1,000 Units by mouth every day.     • Coenzyme Q10 200 MG Cap Take 1 Cap by mouth every day. 100 Cap 3   • ezetimibe (ZETIA) 10 MG Tab TAKE ONE TABLET BY MOUTH EVERY DAY (Patient taking differently: 5 mg. .5 tab daily) 90 Tab 3   • simvastatin (ZOCOR) 20 MG Tab TAKE ONE TABLET BY MOUTH EVERY DAY 90 Tab 3   • B Complex Vitamins (B COMPLEX 1 PO)      • aspirin EC (ECOTRIN) 81 MG Tablet Delayed Response Take 1 Tab  "by mouth.     • Multiple Vitamins-Minerals (OCUVITE ADULT 50+) Cap Take 1 Each by mouth.     • [DISCONTINUED] SUPREP BOWEL PREP KIT 17.5-3.13-1.6 GM/177ML Solution  (Patient not taking: Reported on 6/7/2021)       No facility-administered encounter medications on file as of 6/7/2021.     Review of Systems   Constitutional: Negative for malaise/fatigue.   Respiratory: Negative for shortness of breath.    Cardiovascular: Negative for chest pain, palpitations, orthopnea, leg swelling and PND.   Gastrointestinal: Negative for abdominal pain.   Musculoskeletal: Negative for falls.   Neurological: Negative for dizziness and loss of consciousness.   Psychiatric/Behavioral: Negative for depression.   All other systems reviewed and are negative.       Objective:   /58 (BP Location: Left arm, Patient Position: Sitting, BP Cuff Size: Adult)   Pulse 82   Resp 14   Ht 1.803 m (5' 11\")   Wt 79.8 kg (176 lb)   SpO2 94%   BMI 24.55 kg/m²     Physical Exam   Constitutional: He is oriented to person, place, and time. He appears well-developed. No distress.   HENT:   Head: Normocephalic and atraumatic.   Eyes: Conjunctivae are normal. No scleral icterus.   Cardiovascular: Normal rate, regular rhythm and normal heart sounds. Exam reveals no gallop and no friction rub.   No murmur heard.  Pulmonary/Chest: Effort normal and breath sounds normal. No respiratory distress. He has no wheezes. He has no rales.   Musculoskeletal:         General: No swelling.      Cervical back: Normal range of motion and neck supple.   Neurological: He is alert and oriented to person, place, and time.   Skin: Skin is warm and dry. He is not diaphoretic.   Psychiatric: His behavior is normal. Judgment and thought content normal.   Nursing note and vitals reviewed.    CBC, Chem-7, lipid panel performed March 2021 were reviewed and showed mild anemia.  Normal renal function.  LDL 48.    Assessment:     1. Coronary artery calcification     2. Mixed " hyperlipidemia     3. Essential hypertension         Medical Decision Making:  Today's Assessment / Status / Plan:     Patient has known coronary artery calcification.  He continues to be asymptomatic.  Continue aspirin.  Continue simvastatin and Zetia for his known hyperlipidemia.  Blood pressures well controlled.  Continue irbesartan hydrochlorothiazide.    Patient has a known right to left shunt on his echocardiogram suggestive of a PFO.  He has been asymptomatic.    Return to clinic in 1 year or earlier if needed.    Thank you for allowing me to participate in the care of this patient. Please do not hesitate to contact me with any questions.    Heike Azul MD, [facility]  Cardiologist  [facility]    PLEASE NOTE: This dictation was created using voice recognition software.

## 2021-06-17 DIAGNOSIS — E78.5 DYSLIPIDEMIA: ICD-10-CM

## 2021-06-17 RX ORDER — SIMVASTATIN 20 MG
TABLET ORAL
Qty: 90 TABLET | Refills: 3 | Status: SHIPPED | OUTPATIENT
Start: 2021-06-17 | End: 2022-06-13

## 2021-10-08 ENCOUNTER — HOSPITAL ENCOUNTER (OUTPATIENT)
Facility: MEDICAL CENTER | Age: 71
End: 2021-10-08
Attending: INTERNAL MEDICINE
Payer: COMMERCIAL

## 2021-10-08 ENCOUNTER — NON-PROVIDER VISIT (OUTPATIENT)
Dept: INTERNAL MEDICINE | Facility: IMAGING CENTER | Age: 71
End: 2021-10-08
Payer: COMMERCIAL

## 2021-10-08 DIAGNOSIS — Z23 NEED FOR INFLUENZA VACCINATION: ICD-10-CM

## 2021-10-08 DIAGNOSIS — D64.9 ANEMIA, MILD: ICD-10-CM

## 2021-10-08 DIAGNOSIS — R73.03 PREDIABETES: ICD-10-CM

## 2021-10-08 DIAGNOSIS — I10 ESSENTIAL HYPERTENSION: ICD-10-CM

## 2021-10-08 DIAGNOSIS — E78.00 HYPERCHOLESTEROLEMIA: ICD-10-CM

## 2021-10-08 DIAGNOSIS — Z01.89 ENCOUNTER FOR ROUTINE LABORATORY TESTING: ICD-10-CM

## 2021-10-08 DIAGNOSIS — R74.8 ELEVATED CPK: ICD-10-CM

## 2021-10-08 LAB
ALBUMIN SERPL BCP-MCNC: 4 G/DL (ref 3.2–4.9)
ALBUMIN/GLOB SERPL: 1.2 G/DL
ALP SERPL-CCNC: 60 U/L (ref 30–99)
ALT SERPL-CCNC: 21 U/L (ref 2–50)
ANION GAP SERPL CALC-SCNC: 11 MMOL/L (ref 7–16)
AST SERPL-CCNC: 24 U/L (ref 12–45)
BASOPHILS # BLD AUTO: 0.8 % (ref 0–1.8)
BASOPHILS # BLD: 0.04 K/UL (ref 0–0.12)
BILIRUB SERPL-MCNC: 0.5 MG/DL (ref 0.1–1.5)
BUN SERPL-MCNC: 20 MG/DL (ref 8–22)
CALCIUM SERPL-MCNC: 9.1 MG/DL (ref 8.5–10.5)
CHLORIDE SERPL-SCNC: 103 MMOL/L (ref 96–112)
CHOLEST SERPL-MCNC: 116 MG/DL (ref 100–199)
CK SERPL-CCNC: 217 U/L (ref 0–154)
CO2 SERPL-SCNC: 27 MMOL/L (ref 20–33)
CREAT SERPL-MCNC: 1.11 MG/DL (ref 0.5–1.4)
EOSINOPHIL # BLD AUTO: 0.39 K/UL (ref 0–0.51)
EOSINOPHIL NFR BLD: 7.3 % (ref 0–6.9)
ERYTHROCYTE [DISTWIDTH] IN BLOOD BY AUTOMATED COUNT: 41.9 FL (ref 35.9–50)
FERRITIN SERPL-MCNC: 158 NG/ML (ref 22–322)
GLOBULIN SER CALC-MCNC: 3.3 G/DL (ref 1.9–3.5)
GLUCOSE SERPL-MCNC: 89 MG/DL (ref 65–99)
HCT VFR BLD AUTO: 41.7 % (ref 42–52)
HDLC SERPL-MCNC: 54 MG/DL
HGB BLD-MCNC: 13.6 G/DL (ref 14–18)
IMM GRANULOCYTES # BLD AUTO: 0.01 K/UL (ref 0–0.11)
IMM GRANULOCYTES NFR BLD AUTO: 0.2 % (ref 0–0.9)
IRON SATN MFR SERPL: 25 % (ref 15–55)
IRON SERPL-MCNC: 65 UG/DL (ref 50–180)
LDLC SERPL CALC-MCNC: 52 MG/DL
LYMPHOCYTES # BLD AUTO: 1.35 K/UL (ref 1–4.8)
LYMPHOCYTES NFR BLD: 25.3 % (ref 22–41)
MCH RBC QN AUTO: 30.2 PG (ref 27–33)
MCHC RBC AUTO-ENTMCNC: 32.6 G/DL (ref 33.7–35.3)
MCV RBC AUTO: 92.7 FL (ref 81.4–97.8)
MONOCYTES # BLD AUTO: 0.4 K/UL (ref 0–0.85)
MONOCYTES NFR BLD AUTO: 7.5 % (ref 0–13.4)
NEUTROPHILS # BLD AUTO: 3.14 K/UL (ref 1.82–7.42)
NEUTROPHILS NFR BLD: 58.9 % (ref 44–72)
NRBC # BLD AUTO: 0 K/UL
NRBC BLD-RTO: 0 /100 WBC
PLATELET # BLD AUTO: 234 K/UL (ref 164–446)
PMV BLD AUTO: 10.3 FL (ref 9–12.9)
POTASSIUM SERPL-SCNC: 4 MMOL/L (ref 3.6–5.5)
PROT SERPL-MCNC: 7.3 G/DL (ref 6–8.2)
RBC # BLD AUTO: 4.5 M/UL (ref 4.7–6.1)
SODIUM SERPL-SCNC: 141 MMOL/L (ref 135–145)
TIBC SERPL-MCNC: 262 UG/DL (ref 250–450)
TRIGL SERPL-MCNC: 51 MG/DL (ref 0–149)
UIBC SERPL-MCNC: 197 UG/DL (ref 110–370)
WBC # BLD AUTO: 5.3 K/UL (ref 4.8–10.8)

## 2021-10-08 PROCEDURE — 83550 IRON BINDING TEST: CPT

## 2021-10-08 PROCEDURE — 83540 ASSAY OF IRON: CPT

## 2021-10-08 PROCEDURE — 85025 COMPLETE CBC W/AUTO DIFF WBC: CPT

## 2021-10-08 PROCEDURE — 90662 IIV NO PRSV INCREASED AG IM: CPT | Performed by: INTERNAL MEDICINE

## 2021-10-08 PROCEDURE — 90471 IMMUNIZATION ADMIN: CPT | Performed by: INTERNAL MEDICINE

## 2021-10-08 PROCEDURE — 80053 COMPREHEN METABOLIC PANEL: CPT

## 2021-10-08 PROCEDURE — 83036 HEMOGLOBIN GLYCOSYLATED A1C: CPT

## 2021-10-08 PROCEDURE — 80061 LIPID PANEL: CPT

## 2021-10-08 PROCEDURE — 82550 ASSAY OF CK (CPK): CPT

## 2021-10-08 PROCEDURE — 82728 ASSAY OF FERRITIN: CPT

## 2021-10-10 LAB
EST. AVERAGE GLUCOSE BLD GHB EST-MCNC: 108 MG/DL
HBA1C MFR BLD: 5.4 % (ref 4–5.6)

## 2021-10-22 ENCOUNTER — OFFICE VISIT (OUTPATIENT)
Dept: INTERNAL MEDICINE | Facility: IMAGING CENTER | Age: 71
End: 2021-10-22
Payer: COMMERCIAL

## 2021-10-22 VITALS
HEART RATE: 85 BPM | BODY MASS INDEX: 25.06 KG/M2 | WEIGHT: 179 LBS | OXYGEN SATURATION: 95 % | RESPIRATION RATE: 14 BRPM | DIASTOLIC BLOOD PRESSURE: 64 MMHG | SYSTOLIC BLOOD PRESSURE: 110 MMHG | TEMPERATURE: 99 F | HEIGHT: 71 IN

## 2021-10-22 DIAGNOSIS — R74.8 ELEVATED CPK: ICD-10-CM

## 2021-10-22 DIAGNOSIS — D64.9 ANEMIA, MILD: ICD-10-CM

## 2021-10-22 DIAGNOSIS — Z79.899 LONG TERM USE OF DRUG: ICD-10-CM

## 2021-10-22 DIAGNOSIS — Z86.39 HISTORY OF NON ANEMIC VITAMIN B12 DEFICIENCY: ICD-10-CM

## 2021-10-22 DIAGNOSIS — Z12.5 SCREENING FOR PROSTATE CANCER: ICD-10-CM

## 2021-10-22 DIAGNOSIS — R73.03 PREDIABETES: ICD-10-CM

## 2021-10-22 DIAGNOSIS — Z86.39 HISTORY OF VITAMIN D DEFICIENCY: ICD-10-CM

## 2021-10-22 DIAGNOSIS — I10 ESSENTIAL HYPERTENSION: ICD-10-CM

## 2021-10-22 DIAGNOSIS — E78.00 HYPERCHOLESTEROLEMIA: ICD-10-CM

## 2021-10-22 PROCEDURE — 99214 OFFICE O/P EST MOD 30 MIN: CPT | Performed by: INTERNAL MEDICINE

## 2021-10-22 ASSESSMENT — FIBROSIS 4 INDEX: FIB4 SCORE: 1.61

## 2021-10-22 ASSESSMENT — ENCOUNTER SYMPTOMS: MYALGIAS: 0

## 2021-10-22 ASSESSMENT — PATIENT HEALTH QUESTIONNAIRE - PHQ9: CLINICAL INTERPRETATION OF PHQ2 SCORE: 0

## 2021-10-22 NOTE — PROGRESS NOTES
"Established Patient Note   HPI:        Mani returns today to follow up HTN, mild anemia and hyperlipidemia; he has done recent lab work. He saw his cardiologist in June. He states he has eliminated a fair amount of sweets since last seen in hopes it would help lower HbA1c.    Past Medical History:   Diagnosis Date   • Agatston CAC score 200-399 2/8/2018    CAC Jan. 2018: LMA 0, LCX 63.5, .1, RCA 13.4, PDA 0 (total 377.7)   • Anxiety     on Lexapro since 2003   • B12 deficiency 2/8/2018   • Carotid atherosclerosis, bilateral 2/13/2019   • Headache     \"life long\" per pt   • History of colon polyps 11/6/2019   • Hyperlipidemia    • Hypertension    • Vertigo 2015       Current Outpatient Medications   Medication Sig Dispense Refill   • simvastatin (ZOCOR) 20 MG Tab TAKE ONE TABLET BY MOUTH EVERY DAY 90 tablet 3   • metFORMIN ER (GLUCOPHAGE XR) 500 MG TABLET SR 24 HR TAKE ONE TABLET BY MOUTH EVERY DAY 90 Tab 3   • irbesartan-hydrochlorothiazide (AVALIDE) 150-12.5 MG per tablet TAKE ONE TABLET BY MOUTH EVERY DAY 90 Tab 3   • escitalopram (LEXAPRO) 10 MG Tab TAKE 1&1/2 TABLETS BY MOUTH ONCE DAILY (Patient taking differently: 10 mg.) 135 Tab 3   • vitamin D (CHOLECALCIFEROL) 1000 Unit (25 mcg) Tab Take 1,000 Units by mouth every day.     • Coenzyme Q10 200 MG Cap Take 1 Cap by mouth every day. 100 Cap 3   • ezetimibe (ZETIA) 10 MG Tab TAKE ONE TABLET BY MOUTH EVERY DAY (Patient taking differently: 5 mg. .5 tab daily) 90 Tab 3   • B Complex Vitamins (B COMPLEX 1 PO)      • aspirin EC (ECOTRIN) 81 MG Tablet Delayed Response Take 1 Tab by mouth.     • Multiple Vitamins-Minerals (OCUVITE ADULT 50+) Cap Take 1 Each by mouth.       No current facility-administered medications for this visit.         No Known Allergies      Social History     Tobacco Use   • Smoking status: Former Smoker     Packs/day: 1.00     Years: 2.00     Pack years: 2.00     Types: Cigarettes     Start date: 12/20/1957     Quit date: 12/20/1959     " "Years since quittin.8   • Smokeless tobacco: Never Used   Substance Use Topics   • Alcohol use: Yes     Alcohol/week: 4.2 oz     Types: 7 Cans of beer per week   • Drug use: No       Past Surgical History:   Procedure Laterality Date   • HERNIA REPAIR          Review of Systems   Musculoskeletal: Negative for myalgias.        No muscle weakness.       Ambulatory Vitals  /64 (BP Location: Left arm, Patient Position: Sitting, BP Cuff Size: Adult)   Pulse 85   Temp 37.2 °C (99 °F) (Temporal)   Resp 14   Ht 1.803 m (5' 11\")   Wt 81.2 kg (179 lb)   SpO2 95%   BMI 24.97 kg/m²     Physical Exam  Constitutional:       Appearance: Normal appearance.   Cardiovascular:      Rate and Rhythm: Normal rate and regular rhythm.      Heart sounds: Murmur heard.   Systolic murmur is present with a grade of 1/6.   No friction rub. No gallop.    Pulmonary:      Effort: Pulmonary effort is normal.      Breath sounds: Normal breath sounds. No wheezing or rales.   Abdominal:      General: Abdomen is flat.      Palpations: Abdomen is soft. There is no mass.      Tenderness: There is no abdominal tenderness. There is no right CVA tenderness or left CVA tenderness.   Musculoskeletal:      Right lower leg: No edema.      Left lower leg: No edema.   Neurological:      General: No focal deficit present.      Coordination: Coordination normal.      Gait: Gait normal.         Component      Latest Ref Rng & Units 2020 3/26/2021 2021 10/8/2021   WBC      4.8 - 10.8 K/uL  4.9  5.3   RBC      4.70 - 6.10 M/uL  4.64 (L)  4.50 (L)   Hemoglobin      14.0 - 18.0 g/dL  13.6 (L)  13.6 (L)   Hematocrit      42.0 - 52.0 %  43.2  41.7 (L)   MCV      81.4 - 97.8 fL  93.1  92.7   MCH      27.0 - 33.0 pg  29.3  30.2   MCHC      33.7 - 35.3 g/dL  31.5 (L)  32.6 (L)   RDW      35.9 - 50.0 fL  42.1  41.9   Platelet Count      164 - 446 K/uL  229  234   MPV      9.0 - 12.9 fL  10.6  10.3   Neutrophils-Polys      44.00 - 72.00 %  56.30  " 58.90   Lymphocytes      22.00 - 41.00 %  27.20  25.30   Monocytes      0.00 - 13.40 %  8.50  7.50   Eosinophils      0.00 - 6.90 %  7.20 (H)  7.30 (H)   Basophils      0.00 - 1.80 %  0.40  0.80   Immature Granulocytes      0.00 - 0.90 %  0.40  0.20   Nucleated RBC      /100 WBC  0.00  0.00   Neutrophils (Absolute)      1.82 - 7.42 K/uL  2.73  3.14   Lymphs (Absolute)      1.00 - 4.80 K/uL  1.32  1.35   Monos (Absolute)      0.00 - 0.85 K/uL  0.41  0.40   Eos (Absolute)      0.00 - 0.51 K/uL  0.35  0.39   Baso (Absolute)      0.00 - 0.12 K/uL  0.02  0.04   Immature Granulocytes (abs)      0.00 - 0.11 K/uL  0.02  0.01   NRBC (Absolute)      K/uL  0.00  0.00   Sodium      135 - 145 mmol/L 142 138  141   Potassium      3.6 - 5.5 mmol/L 4.2 3.8  4.0   Chloride      96 - 112 mmol/L 105 101  103   Co2      20 - 33 mmol/L 27 29  27   Anion Gap      7.0 - 16.0 10.0 8.0  11.0   Glucose      65 - 99 mg/dL 94 92  89   Bun      8 - 22 mg/dL 22 23 (H)  20   Creatinine      0.50 - 1.40 mg/dL 1.16 1.07  1.11   Calcium      8.5 - 10.5 mg/dL 8.9 9.0  9.1   AST(SGOT)      12 - 45 U/L 21 26  24   ALT(SGPT)      2 - 50 U/L 18 22  21   Alkaline Phosphatase      30 - 99 U/L 57 61  60   Total Bilirubin      0.1 - 1.5 mg/dL 0.5 0.6  0.5   Albumin      3.2 - 4.9 g/dL 4.0 3.9  4.0   Total Protein      6.0 - 8.2 g/dL 7.2 7.4  7.3   Globulin      1.9 - 3.5 g/dL 3.2 3.5  3.3   A-G Ratio      g/dL 1.3 1.1  1.2   Cholesterol,Tot      100 - 199 mg/dL 108 113  116   Triglycerides      0 - 149 mg/dL 52 65  51   HDL      >=40 mg/dL 51 52  54   LDL Cholesterol      <=129 mg/dL 47 48     LDL Particle      <=1135 nmol/L 824 806     Small LDL      <=634 nmol/L 382 353     L-VLDL Particle No.      <=2.7 nmol/L <1.5 <1.5     HDL Particle No.      >=33.0 umol/L 26.4 (L) 25.6 (L)     L-HDL Particle No.      >=4.2 umol/L 6.6 6.6     LDL Particle Size      >=20.7 nm 20.7 20.7     VLDL Size      <=46.7 nm 46.2 44.9     HDL Size      >=8.9 nm 9.2 9.2     EER  LipoFit by NMR       See Note See Note     Color         DK Yellow    Character         Clear    Specific Gravity      <1.035   1.024    Ph      5.0 - 8.0   5.0    Glucose      Negative mg/dL   Negative    Ketones      Negative mg/dL   Trace (A)    Protein      Negative mg/dL   Negative    Bilirubin      Negative   Small (A)    Urobilinogen, Urine      Negative   1.0    Nitrite      Negative   Negative    Leukocyte Esterase      Negative   Negative    Occult Blood      Negative   Negative    Micro Urine Req         see below    Iron      50 - 180 ug/dL    65   Total Iron Binding      250 - 450 ug/dL    262   Unsat Iron Binding      110 - 370 ug/dL    197   % Saturation      15 - 55 %    25   LDL      <100 mg/dL    52   Glycohemoglobin      4.0 - 5.6 % 5.8 (H) 6.0 (H)  5.4   Estim. Avg Glu      mg/dL 120 126  108   GFR If African American      >60 mL/min/1.73 m 2 >60 >60  >60   GFR If Non African American      >60 mL/min/1.73 m 2 >60 >60  >60   CPK Total      0 - 154 U/L 219 (H) 211 (H)  217 (H)   Vitamin B12 -True Cobalamin      211 - 911 pg/mL 763 437     TSH      0.380 - 5.330 uIU/mL  2.620     25-Hydroxy   Vitamin D 25      30 - 100 ng/mL  45     Prostatic Specific Antigen Tot      0.00 - 4.00 ng/mL  0.77     Ferritin      22.0 - 322.0 ng/mL    158.0     Assessment and Plan:     1. Essential hypertension  Comp Metabolic Panel    TSH   2. Hypercholesterolemia  Lipid Profile    TSH   3. Elevated CPK  CREATINE KINASE    Likely due to statin   4. History of vitamin D deficiency  VITAMIN D,25 HYDROXY   5. History of non anemic vitamin B12 deficiency  VITAMIN B12    CBC WITH DIFFERENTIAL   6. Prediabetes  HEMOGLOBIN A1C    Comp Metabolic Panel    URINALYSIS    TSH    HbA1c has normalized on recent check   7. Anemia, mild  VITAMIN B12    CBC WITH DIFFERENTIAL    RBC/Hgb/Hct stable past 6 months   8. Long term use of drug  Comp Metabolic Panel    CREATINE KINASE   9. Screening for prostate cancer  PROSTATE SPECIFIC AG  SCREENING     Continue current medications at current dosages. Discussed he could stop metformin; he wishes to remain on low dose at this time and repeat HbA1c in 6 months. Check CBC, B12, CMP, CPK, lipid panel, vitamin D, PSA, TSH, urinalysis in 6 months.  Face to face time: 30 minutes with greater than 50% of time spent with direct patient contact and medical management.     Korey Onofre M.D.

## 2022-01-21 ENCOUNTER — NON-PROVIDER VISIT (OUTPATIENT)
Dept: INTERNAL MEDICINE | Facility: IMAGING CENTER | Age: 72
End: 2022-01-21
Payer: COMMERCIAL

## 2022-01-21 ENCOUNTER — HOSPITAL ENCOUNTER (OUTPATIENT)
Facility: MEDICAL CENTER | Age: 72
End: 2022-01-21
Attending: INTERNAL MEDICINE
Payer: COMMERCIAL

## 2022-01-21 DIAGNOSIS — Z20.822 CLOSE EXPOSURE TO COVID-19 VIRUS: ICD-10-CM

## 2022-01-21 LAB — COVID ORDER STATUS COVID19: NORMAL

## 2022-01-21 PROCEDURE — U0003 INFECTIOUS AGENT DETECTION BY NUCLEIC ACID (DNA OR RNA); SEVERE ACUTE RESPIRATORY SYNDROME CORONAVIRUS 2 (SARS-COV-2) (CORONAVIRUS DISEASE [COVID-19]), AMPLIFIED PROBE TECHNIQUE, MAKING USE OF HIGH THROUGHPUT TECHNOLOGIES AS DESCRIBED BY CMS-2020-01-R: HCPCS

## 2022-01-21 PROCEDURE — U0005 INFEC AGEN DETEC AMPLI PROBE: HCPCS

## 2022-01-22 LAB
SARS-COV-2 RNA RESP QL NAA+PROBE: NOTDETECTED
SPECIMEN SOURCE: NORMAL

## 2022-02-01 DIAGNOSIS — E78.2 MIXED HYPERLIPIDEMIA: ICD-10-CM

## 2022-02-01 DIAGNOSIS — I10 ESSENTIAL HYPERTENSION: ICD-10-CM

## 2022-02-02 RX ORDER — IRBESARTAN AND HYDROCHLOROTHIAZIDE 150; 12.5 MG/1; MG/1
TABLET, FILM COATED ORAL
Qty: 90 TABLET | Refills: 3 | Status: SHIPPED
Start: 2022-02-02 | End: 2022-11-04

## 2022-02-02 RX ORDER — EZETIMIBE 10 MG/1
TABLET ORAL
Qty: 90 TABLET | Refills: 3 | Status: SHIPPED | OUTPATIENT
Start: 2022-02-02 | End: 2023-02-13 | Stop reason: SDUPTHER

## 2022-02-08 DIAGNOSIS — R73.03 PREDIABETES: ICD-10-CM

## 2022-02-08 RX ORDER — METFORMIN HYDROCHLORIDE 500 MG/1
TABLET, EXTENDED RELEASE ORAL
Qty: 90 TABLET | Refills: 3 | Status: SHIPPED | OUTPATIENT
Start: 2022-02-08 | End: 2023-02-28 | Stop reason: SDUPTHER

## 2022-02-18 DIAGNOSIS — F41.1 GENERALIZED ANXIETY DISORDER: ICD-10-CM

## 2022-02-18 RX ORDER — ESCITALOPRAM OXALATE 10 MG/1
TABLET ORAL
Qty: 135 TABLET | Refills: 3 | Status: SHIPPED
Start: 2022-02-18 | End: 2022-11-04

## 2022-03-18 ENCOUNTER — APPOINTMENT (OUTPATIENT)
Dept: INTERNAL MEDICINE | Facility: IMAGING CENTER | Age: 72
End: 2022-03-18
Payer: COMMERCIAL

## 2022-04-04 ENCOUNTER — OFFICE VISIT (OUTPATIENT)
Dept: INTERNAL MEDICINE | Facility: IMAGING CENTER | Age: 72
End: 2022-04-04
Payer: COMMERCIAL

## 2022-04-04 VITALS
BODY MASS INDEX: 25.06 KG/M2 | WEIGHT: 179 LBS | HEIGHT: 71 IN | OXYGEN SATURATION: 96 % | DIASTOLIC BLOOD PRESSURE: 60 MMHG | RESPIRATION RATE: 14 BRPM | HEART RATE: 76 BPM | TEMPERATURE: 98.6 F | SYSTOLIC BLOOD PRESSURE: 102 MMHG

## 2022-04-04 DIAGNOSIS — J06.9 UPPER RESPIRATORY TRACT INFECTION, UNSPECIFIED TYPE: ICD-10-CM

## 2022-04-04 PROCEDURE — 99213 OFFICE O/P EST LOW 20 MIN: CPT | Performed by: INTERNAL MEDICINE

## 2022-04-04 RX ORDER — CEFDINIR 300 MG/1
300 CAPSULE ORAL 2 TIMES DAILY
Qty: 20 CAPSULE | Refills: 0 | Status: SHIPPED | OUTPATIENT
Start: 2022-04-04 | End: 2022-04-29

## 2022-04-04 ASSESSMENT — FIBROSIS 4 INDEX: FIB4 SCORE: 1.61

## 2022-04-04 NOTE — PROGRESS NOTES
"Established Patient Note   HPI:        Mani comes in today with complaint ear congestion for past 2-3 weeks. He developed sore throat and post nasal drip 3 days ago. He has used flonase in past for congestion; he denies significant history of seasonal allergies. He states he has been noticing some phlegm and he noticed some green/yellow nasal discharge. He states when he gets this way that antibiotic has usually worked best in past.    Past Medical History:   Diagnosis Date   • Agatston CAC score 200-399 2/8/2018    CAC Jan. 2018: LMA 0, LCX 63.5, .1, RCA 13.4, PDA 0 (total 377.7)   • Anxiety     on Lexapro since 2003   • B12 deficiency 2/8/2018   • Carotid atherosclerosis, bilateral 2/13/2019   • Headache     \"life long\" per pt   • History of colon polyps 11/6/2019   • Hyperlipidemia    • Hypertension    • Vertigo 2015       Current Outpatient Medications   Medication Sig Dispense Refill   • cefdinir (OMNICEF) 300 MG Cap Take 1 Capsule by mouth 2 times a day. 20 Capsule 0   • escitalopram (LEXAPRO) 10 MG Tab TAKE 1 & 1/2 TABLET BY MOUTH ONCE DAILY 135 Tablet 3   • metFORMIN ER (GLUCOPHAGE XR) 500 MG TABLET SR 24 HR TAKE ONE TABLET BY MOUTH EVERY DAY 90 Tablet 3   • irbesartan-hydrochlorothiazide (AVALIDE) 150-12.5 MG per tablet TAKE ONE TABLET BY MOUTH EVERY DAY 90 Tablet 3   • ezetimibe (ZETIA) 10 MG Tab TAKE ONE TABLET BY MOUTH ONCE DAILY 90 Tablet 3   • simvastatin (ZOCOR) 20 MG Tab TAKE ONE TABLET BY MOUTH EVERY DAY 90 tablet 3   • vitamin D (CHOLECALCIFEROL) 1000 Unit (25 mcg) Tab Take 1,000 Units by mouth every day.     • Coenzyme Q10 200 MG Cap Take 1 Cap by mouth every day. 100 Cap 3   • B Complex Vitamins (B COMPLEX 1 PO)      • aspirin EC (ECOTRIN) 81 MG Tablet Delayed Response Take 1 Tab by mouth.     • Multiple Vitamins-Minerals (OCUVITE ADULT 50+) Cap Take 1 Each by mouth.       No current facility-administered medications for this visit.         No Known Allergies      Social History     Tobacco " "Use   • Smoking status: Former Smoker     Packs/day: 1.00     Years: 2.00     Pack years: 2.00     Types: Cigarettes     Start date: 1957     Quit date: 1959     Years since quittin.3   • Smokeless tobacco: Never Used   Substance Use Topics   • Alcohol use: Yes     Alcohol/week: 4.2 oz     Types: 7 Cans of beer per week   • Drug use: No       Past Surgical History:   Procedure Laterality Date   • HERNIA REPAIR  2006        ROS    Ambulatory Vitals  /60 (BP Location: Left arm, Patient Position: Sitting, BP Cuff Size: Adult)   Pulse 76   Temp 37 °C (98.6 °F) (Temporal)   Resp 14   Ht 1.803 m (5' 11\")   Wt 81.2 kg (179 lb)   SpO2 96%   BMI 24.97 kg/m²     Physical Exam  Constitutional:       Appearance: Normal appearance.   HENT:      Right Ear: Tympanic membrane normal.      Left Ear: Tympanic membrane normal.      Nose: No congestion.      Comments: Sinuses nontender with palpation over maxillary and frontal sinuses.     Mouth/Throat:      Pharynx: Oropharynx is clear. No oropharyngeal exudate.   Cardiovascular:      Pulses: Normal pulses.      Heart sounds: Normal heart sounds. No murmur heard.    No gallop.   Pulmonary:      Effort: Pulmonary effort is normal.      Breath sounds: Normal breath sounds. No wheezing or rales.           Assessment and Plan:     1. Upper respiratory tract infection, unspecified type  cefdinir (OMNICEF) 300 MG Cap    Suspect mild sinusitis     Discussed he should also take probiotic (otc) along with antibiotic to help avoid potential complication of antibiotic use. If symptoms do not improve following antibiotic will consider CT sinuses and refer to ENT.    Korey Onofre M.D.  "

## 2022-04-15 ENCOUNTER — NON-PROVIDER VISIT (OUTPATIENT)
Dept: INTERNAL MEDICINE | Facility: IMAGING CENTER | Age: 72
End: 2022-04-15
Payer: COMMERCIAL

## 2022-04-15 ENCOUNTER — HOSPITAL ENCOUNTER (OUTPATIENT)
Facility: MEDICAL CENTER | Age: 72
End: 2022-04-15
Attending: INTERNAL MEDICINE
Payer: COMMERCIAL

## 2022-04-15 DIAGNOSIS — D64.9 ANEMIA, MILD: ICD-10-CM

## 2022-04-15 DIAGNOSIS — E78.00 HYPERCHOLESTEROLEMIA: ICD-10-CM

## 2022-04-15 DIAGNOSIS — R74.8 ELEVATED CPK: ICD-10-CM

## 2022-04-15 DIAGNOSIS — Z01.89 ENCOUNTER FOR ROUTINE LABORATORY TESTING: ICD-10-CM

## 2022-04-15 DIAGNOSIS — R73.03 PREDIABETES: ICD-10-CM

## 2022-04-15 DIAGNOSIS — I10 ESSENTIAL HYPERTENSION: ICD-10-CM

## 2022-04-15 DIAGNOSIS — Z79.899 LONG TERM USE OF DRUG: ICD-10-CM

## 2022-04-15 DIAGNOSIS — Z12.5 SCREENING FOR PROSTATE CANCER: ICD-10-CM

## 2022-04-15 DIAGNOSIS — Z86.39 HISTORY OF NON ANEMIC VITAMIN B12 DEFICIENCY: ICD-10-CM

## 2022-04-15 DIAGNOSIS — Z86.39 HISTORY OF VITAMIN D DEFICIENCY: ICD-10-CM

## 2022-04-15 LAB
25(OH)D3 SERPL-MCNC: 44 NG/ML (ref 30–100)
ALBUMIN SERPL BCP-MCNC: 4 G/DL (ref 3.2–4.9)
ALBUMIN/GLOB SERPL: 1.3 G/DL
ALP SERPL-CCNC: 62 U/L (ref 30–99)
ALT SERPL-CCNC: 21 U/L (ref 2–50)
ANION GAP SERPL CALC-SCNC: 11 MMOL/L (ref 7–16)
AST SERPL-CCNC: 34 U/L (ref 12–45)
BASOPHILS # BLD AUTO: 0.6 % (ref 0–1.8)
BASOPHILS # BLD: 0.03 K/UL (ref 0–0.12)
BILIRUB SERPL-MCNC: 0.6 MG/DL (ref 0.1–1.5)
BUN SERPL-MCNC: 18 MG/DL (ref 8–22)
CALCIUM SERPL-MCNC: 9.1 MG/DL (ref 8.5–10.5)
CHLORIDE SERPL-SCNC: 102 MMOL/L (ref 96–112)
CHOLEST SERPL-MCNC: 125 MG/DL (ref 100–199)
CK SERPL-CCNC: 251 U/L (ref 0–154)
CO2 SERPL-SCNC: 26 MMOL/L (ref 20–33)
CREAT SERPL-MCNC: 1.13 MG/DL (ref 0.5–1.4)
EOSINOPHIL # BLD AUTO: 0.25 K/UL (ref 0–0.51)
EOSINOPHIL NFR BLD: 4.6 % (ref 0–6.9)
ERYTHROCYTE [DISTWIDTH] IN BLOOD BY AUTOMATED COUNT: 42.8 FL (ref 35.9–50)
EST. AVERAGE GLUCOSE BLD GHB EST-MCNC: 111 MG/DL
GFR SERPLBLD CREATININE-BSD FMLA CKD-EPI: 69 ML/MIN/1.73 M 2
GLOBULIN SER CALC-MCNC: 3.1 G/DL (ref 1.9–3.5)
GLUCOSE SERPL-MCNC: 88 MG/DL (ref 65–99)
HBA1C MFR BLD: 5.5 % (ref 4–5.6)
HCT VFR BLD AUTO: 41.1 % (ref 42–52)
HDLC SERPL-MCNC: 57 MG/DL
HGB BLD-MCNC: 13.4 G/DL (ref 14–18)
IMM GRANULOCYTES # BLD AUTO: 0.02 K/UL (ref 0–0.11)
IMM GRANULOCYTES NFR BLD AUTO: 0.4 % (ref 0–0.9)
LDLC SERPL CALC-MCNC: 57 MG/DL
LYMPHOCYTES # BLD AUTO: 1.48 K/UL (ref 1–4.8)
LYMPHOCYTES NFR BLD: 27.3 % (ref 22–41)
MCH RBC QN AUTO: 30.1 PG (ref 27–33)
MCHC RBC AUTO-ENTMCNC: 32.6 G/DL (ref 33.7–35.3)
MCV RBC AUTO: 92.4 FL (ref 81.4–97.8)
MONOCYTES # BLD AUTO: 0.44 K/UL (ref 0–0.85)
MONOCYTES NFR BLD AUTO: 8.1 % (ref 0–13.4)
NEUTROPHILS # BLD AUTO: 3.21 K/UL (ref 1.82–7.42)
NEUTROPHILS NFR BLD: 59 % (ref 44–72)
NRBC # BLD AUTO: 0 K/UL
NRBC BLD-RTO: 0 /100 WBC
PLATELET # BLD AUTO: 244 K/UL (ref 164–446)
PMV BLD AUTO: 10.2 FL (ref 9–12.9)
POTASSIUM SERPL-SCNC: 3.6 MMOL/L (ref 3.6–5.5)
PROT SERPL-MCNC: 7.1 G/DL (ref 6–8.2)
PSA SERPL-MCNC: 1.05 NG/ML (ref 0–4)
RBC # BLD AUTO: 4.45 M/UL (ref 4.7–6.1)
SODIUM SERPL-SCNC: 139 MMOL/L (ref 135–145)
TRIGL SERPL-MCNC: 54 MG/DL (ref 0–149)
TSH SERPL DL<=0.005 MIU/L-ACNC: 2.68 UIU/ML (ref 0.38–5.33)
VIT B12 SERPL-MCNC: 856 PG/ML (ref 211–911)
WBC # BLD AUTO: 5.4 K/UL (ref 4.8–10.8)

## 2022-04-15 PROCEDURE — 80061 LIPID PANEL: CPT

## 2022-04-15 PROCEDURE — 83036 HEMOGLOBIN GLYCOSYLATED A1C: CPT

## 2022-04-15 PROCEDURE — 82550 ASSAY OF CK (CPK): CPT

## 2022-04-15 PROCEDURE — 82607 VITAMIN B-12: CPT

## 2022-04-15 PROCEDURE — 82306 VITAMIN D 25 HYDROXY: CPT

## 2022-04-15 PROCEDURE — 85025 COMPLETE CBC W/AUTO DIFF WBC: CPT

## 2022-04-15 PROCEDURE — 84443 ASSAY THYROID STIM HORMONE: CPT

## 2022-04-15 PROCEDURE — 80053 COMPREHEN METABOLIC PANEL: CPT

## 2022-04-15 PROCEDURE — 84153 ASSAY OF PSA TOTAL: CPT

## 2022-04-29 ENCOUNTER — HOSPITAL ENCOUNTER (OUTPATIENT)
Facility: MEDICAL CENTER | Age: 72
End: 2022-04-29
Attending: INTERNAL MEDICINE
Payer: COMMERCIAL

## 2022-04-29 ENCOUNTER — OFFICE VISIT (OUTPATIENT)
Dept: INTERNAL MEDICINE | Facility: IMAGING CENTER | Age: 72
End: 2022-04-29
Payer: COMMERCIAL

## 2022-04-29 VITALS
TEMPERATURE: 98.8 F | BODY MASS INDEX: 25.48 KG/M2 | DIASTOLIC BLOOD PRESSURE: 62 MMHG | OXYGEN SATURATION: 97 % | HEART RATE: 68 BPM | RESPIRATION RATE: 14 BRPM | SYSTOLIC BLOOD PRESSURE: 116 MMHG | WEIGHT: 182 LBS | HEIGHT: 71 IN

## 2022-04-29 DIAGNOSIS — I10 ESSENTIAL HYPERTENSION: ICD-10-CM

## 2022-04-29 DIAGNOSIS — R74.8 ELEVATED CPK: ICD-10-CM

## 2022-04-29 DIAGNOSIS — D64.9 ANEMIA, UNSPECIFIED TYPE: ICD-10-CM

## 2022-04-29 DIAGNOSIS — E78.00 HYPERCHOLESTEROLEMIA: ICD-10-CM

## 2022-04-29 DIAGNOSIS — R73.03 PREDIABETES: ICD-10-CM

## 2022-04-29 DIAGNOSIS — Z23 NEED FOR SHINGLES VACCINE: ICD-10-CM

## 2022-04-29 LAB
APPEARANCE UR: CLEAR
BILIRUB UR QL STRIP.AUTO: NEGATIVE
COLOR UR: YELLOW
GLUCOSE UR STRIP.AUTO-MCNC: NEGATIVE MG/DL
KETONES UR STRIP.AUTO-MCNC: NEGATIVE MG/DL
LEUKOCYTE ESTERASE UR QL STRIP.AUTO: NEGATIVE
MICRO URNS: NORMAL
NITRITE UR QL STRIP.AUTO: NEGATIVE
PH UR STRIP.AUTO: 5 [PH] (ref 5–8)
PROT UR QL STRIP: NEGATIVE MG/DL
RBC UR QL AUTO: NEGATIVE
SP GR UR STRIP.AUTO: 1.01
UROBILINOGEN UR STRIP.AUTO-MCNC: 0.2 MG/DL

## 2022-04-29 PROCEDURE — 90471 IMMUNIZATION ADMIN: CPT | Performed by: INTERNAL MEDICINE

## 2022-04-29 PROCEDURE — 99214 OFFICE O/P EST MOD 30 MIN: CPT | Performed by: INTERNAL MEDICINE

## 2022-04-29 PROCEDURE — 90750 HZV VACC RECOMBINANT IM: CPT | Performed by: INTERNAL MEDICINE

## 2022-04-29 PROCEDURE — 81003 URINALYSIS AUTO W/O SCOPE: CPT

## 2022-04-29 ASSESSMENT — ENCOUNTER SYMPTOMS: MYALGIAS: 0

## 2022-04-29 ASSESSMENT — FIBROSIS 4 INDEX: FIB4 SCORE: 2.19

## 2022-04-29 NOTE — PROGRESS NOTES
"Established Patient Note   HPI:        Mani returns today to follow up HTN and hyperlipidemia; he has done recent lab work. He states sinus infection cleared with antibiotic that he took earlier this month.    Past Medical History:   Diagnosis Date   • Agatston CAC score 200-399 2018    CAC 2018: LMA 0, LCX 63.5, .1, RCA 13.4, PDA 0 (total 377.7)   • Anxiety     on Lexapro since    • B12 deficiency 2018   • Carotid atherosclerosis, bilateral 2019   • Headache     \"life long\" per pt   • History of colon polyps 2019   • Hyperlipidemia    • Hypertension    • Vertigo        Current Outpatient Medications   Medication Sig Dispense Refill   • escitalopram (LEXAPRO) 10 MG Tab TAKE 1 & 1/2 TABLET BY MOUTH ONCE DAILY (Patient taking differently: 10 mg.) 135 Tablet 3   • metFORMIN ER (GLUCOPHAGE XR) 500 MG TABLET SR 24 HR TAKE ONE TABLET BY MOUTH EVERY DAY 90 Tablet 3   • irbesartan-hydrochlorothiazide (AVALIDE) 150-12.5 MG per tablet TAKE ONE TABLET BY MOUTH EVERY DAY 90 Tablet 3   • ezetimibe (ZETIA) 10 MG Tab TAKE ONE TABLET BY MOUTH ONCE DAILY 90 Tablet 3   • simvastatin (ZOCOR) 20 MG Tab TAKE ONE TABLET BY MOUTH EVERY DAY 90 tablet 3   • vitamin D (CHOLECALCIFEROL) 1000 Unit (25 mcg) Tab Take 1,000 Units by mouth every day.     • Coenzyme Q10 200 MG Cap Take 1 Cap by mouth every day. 100 Cap 3   • B Complex Vitamins (B COMPLEX 1 PO)      • aspirin EC (ECOTRIN) 81 MG Tablet Delayed Response Take 1 Tab by mouth.     • Multiple Vitamins-Minerals (OCUVITE ADULT 50+) Cap Take 1 Each by mouth.       No current facility-administered medications for this visit.         No Known Allergies      Social History     Tobacco Use   • Smoking status: Former Smoker     Packs/day: 1.00     Years: 2.00     Pack years: 2.00     Types: Cigarettes     Start date: 1957     Quit date: 1959     Years since quittin.4   • Smokeless tobacco: Never Used   Substance Use Topics   • Alcohol use: Yes " "    Alcohol/week: 4.2 oz     Types: 7 Cans of beer per week   • Drug use: No       Past Surgical History:   Procedure Laterality Date   • HERNIA REPAIR  2006        Review of Systems   Musculoskeletal: Negative for myalgias.        Denies any generalized muscle pain or weakness.       Ambulatory Vitals  /62 (BP Location: Left arm, Patient Position: Sitting, BP Cuff Size: Adult)   Pulse 68   Temp 37.1 °C (98.8 °F) (Temporal)   Resp 14   Ht 1.803 m (5' 11\")   Wt 82.6 kg (182 lb)   SpO2 97%   BMI 25.38 kg/m²     Physical Exam  Constitutional:       Appearance: Normal appearance.   Cardiovascular:      Rate and Rhythm: Normal rate and regular rhythm.      Heart sounds: Normal heart sounds. No murmur heard.    No friction rub. No gallop.   Pulmonary:      Effort: Pulmonary effort is normal.      Breath sounds: Normal breath sounds. No wheezing or rales.   Genitourinary:     Prostate: Normal.   Musculoskeletal:      Right lower leg: No edema.      Left lower leg: No edema.         Component      Latest Ref Rng & Units 10/8/2021 1/21/2022 4/15/2022   WBC      4.8 - 10.8 K/uL 5.3  5.4   RBC      4.70 - 6.10 M/uL 4.50 (L)  4.45 (L)   Hemoglobin      14.0 - 18.0 g/dL 13.6 (L)  13.4 (L)   Hematocrit      42.0 - 52.0 % 41.7 (L)  41.1 (L)   MCV      81.4 - 97.8 fL 92.7  92.4   MCH      27.0 - 33.0 pg 30.2  30.1   MCHC      33.7 - 35.3 g/dL 32.6 (L)  32.6 (L)   RDW      35.9 - 50.0 fL 41.9  42.8   Platelet Count      164 - 446 K/uL 234  244   MPV      9.0 - 12.9 fL 10.3  10.2   Neutrophils-Polys      44.00 - 72.00 % 58.90  59.00   Lymphocytes      22.00 - 41.00 % 25.30  27.30   Monocytes      0.00 - 13.40 % 7.50  8.10   Eosinophils      0.00 - 6.90 % 7.30 (H)  4.60   Basophils      0.00 - 1.80 % 0.80  0.60   Immature Granulocytes      0.00 - 0.90 % 0.20  0.40   Nucleated RBC      /100 WBC 0.00  0.00   Neutrophils (Absolute)      1.82 - 7.42 K/uL 3.14  3.21   Lymphs (Absolute)      1.00 - 4.80 K/uL 1.35  1.48   Monos " (Absolute)      0.00 - 0.85 K/uL 0.40  0.44   Eos (Absolute)      0.00 - 0.51 K/uL 0.39  0.25   Baso (Absolute)      0.00 - 0.12 K/uL 0.04  0.03   Immature Granulocytes (abs)      0.00 - 0.11 K/uL 0.01  0.02   NRBC (Absolute)      K/uL 0.00  0.00   Sodium      135 - 145 mmol/L 141  139   Potassium      3.6 - 5.5 mmol/L 4.0  3.6   Chloride      96 - 112 mmol/L 103  102   Co2      20 - 33 mmol/L 27  26   Anion Gap      7.0 - 16.0 11.0  11.0   Glucose      65 - 99 mg/dL 89  88   Bun      8 - 22 mg/dL 20  18   Creatinine      0.50 - 1.40 mg/dL 1.11  1.13   Calcium      8.5 - 10.5 mg/dL 9.1  9.1   AST(SGOT)      12 - 45 U/L 24  34   ALT(SGPT)      2 - 50 U/L 21  21   Alkaline Phosphatase      30 - 99 U/L 60  62   Total Bilirubin      0.1 - 1.5 mg/dL 0.5  0.6   Albumin      3.2 - 4.9 g/dL 4.0  4.0   Total Protein      6.0 - 8.2 g/dL 7.3  7.1   Globulin      1.9 - 3.5 g/dL 3.3  3.1   A-G Ratio      g/dL 1.2  1.3   Iron      50 - 180 ug/dL 65     Total Iron Binding      250 - 450 ug/dL 262     Unsat Iron Binding      110 - 370 ug/dL 197     % Saturation      15 - 55 % 25     Cholesterol,Tot      100 - 199 mg/dL 116  125   Triglycerides      0 - 149 mg/dL 51  54   HDL      >=40 mg/dL 54  57   LDL      <100 mg/dL 52  57   Glycohemoglobin      4.0 - 5.6 % 5.4  5.5   Estim. Avg Glu      mg/dL 108  111   GFR If African American      >60 mL/min/1.73 m 2 >60     GFR If Non African American      >60 mL/min/1.73 m 2 >60     SARS-CoV-2 Source        Nasal Swab    SARS-CoV-2 by PCR        NotDetected    Ferritin      22.0 - 322.0 ng/mL 158.0     CPK Total      0 - 154 U/L 217 (H)  251 (H)   COVID Order Status        Received    Vitamin B12 -True Cobalamin      211 - 911 pg/mL   856   25-Hydroxy   Vitamin D 25      30 - 100 ng/mL   44   Prostatic Specific Antigen Tot      0.00 - 4.00 ng/mL   1.05   TSH      0.380 - 5.330 uIU/mL   2.680   GFR (CKD-EPI)      >60 mL/min/1.73 m 2   69     Component Ref Range & Units 2 wk ago   (4/15/22) 6  mo ago   (10/8/21) 1 yr ago   (3/26/21) 2 yr ago   (1/21/20) 3 yr ago   (11/16/18) 3 yr ago   (5/7/18)   WBC 4.8 - 10.8 K/uL 5.4  5.3  4.9  5.6  5.6  5.1    RBC 4.70 - 6.10 M/uL 4.45 Low   4.50 Low   4.64 Low   4.77  4.54 Low   4.52 Low     Hemoglobin 14.0 - 18.0 g/dL 13.4 Low   13.6 Low   13.6 Low   14.1  13.3 Low   13.4 Low     Hematocrit 42.0 - 52.0 % 41.1 Low   41.7 Low   43.2  45.1  41.9 Low   42.1    MCV 81.4 - 97.8 fL 92.4  92.7  93.1  94.5  92.3  93.1    MCH 27.0 - 33.0 pg 30.1  30.2  29.3  29.6  29.3  29.6    MCHC 33.7 - 35.3 g/dL 32.6 Low   32.6 Low   31.5 Low   31.3 Low   31.7 Low   31.8 Low     RDW 35.9 - 50.0 fL 42.8  41.9  42.1  42.0  42.0  42.8    Platelet Count 164 - 446 K/uL 244  234  229  206  229  221        Assessment and Plan:     1. Essential hypertension  Comp Metabolic Panel    Normotensive taking avalide 150/12.5 mg qd   2. Hypercholesterolemia  Lipid Profile    LDL-C 57 mg/dl taking simvastatin 20 mg qd and zetia 10 mg qd   3. Elevated CPK  CREATINE KINASE    Likely due to statin and ezetimibe   4. Anemia, mild normocytic  CBC WITH DIFFERENTIAL    FERRITIN    IRON/TOTAL IRON BIND    Comp Metabolic Panel    IGGY AND PE, SERUM    RBC/Hgb/Hct stable over past 3 years     Continue current medications at current dosages. Check CBC, iron/TIBC/%sat, ferritin, SPE/SIEP, lipid panel, CPK in 6 months.    Korey Onofre M.D.

## 2022-06-13 DIAGNOSIS — E78.5 DYSLIPIDEMIA: ICD-10-CM

## 2022-06-13 RX ORDER — SIMVASTATIN 20 MG
TABLET ORAL
Qty: 90 TABLET | Refills: 3 | Status: SHIPPED | OUTPATIENT
Start: 2022-06-13 | End: 2023-06-09 | Stop reason: SDUPTHER

## 2022-09-15 PROBLEM — M17.12 DEGENERATIVE ARTHRITIS OF LEFT KNEE: Status: ACTIVE | Noted: 2022-09-15

## 2022-09-27 ENCOUNTER — OFFICE VISIT (OUTPATIENT)
Dept: CARDIOLOGY | Facility: MEDICAL CENTER | Age: 72
End: 2022-09-27
Payer: COMMERCIAL

## 2022-09-27 VITALS
OXYGEN SATURATION: 95 % | WEIGHT: 171 LBS | BODY MASS INDEX: 23.16 KG/M2 | SYSTOLIC BLOOD PRESSURE: 102 MMHG | DIASTOLIC BLOOD PRESSURE: 64 MMHG | HEART RATE: 83 BPM | RESPIRATION RATE: 14 BRPM | HEIGHT: 72 IN

## 2022-09-27 DIAGNOSIS — R93.1 AGATSTON CAC SCORE 200-399: ICD-10-CM

## 2022-09-27 DIAGNOSIS — I35.8 AORTIC VALVE SCLEROSIS: ICD-10-CM

## 2022-09-27 DIAGNOSIS — Q21.12 PFO (PATENT FORAMEN OVALE): ICD-10-CM

## 2022-09-27 DIAGNOSIS — E78.2 MIXED HYPERLIPIDEMIA: ICD-10-CM

## 2022-09-27 DIAGNOSIS — Z01.810 ENCOUNTER FOR PRE-OPERATIVE CARDIOVASCULAR CLEARANCE: ICD-10-CM

## 2022-09-27 DIAGNOSIS — I65.23 CAROTID ATHEROSCLEROSIS, BILATERAL: ICD-10-CM

## 2022-09-27 PROCEDURE — 99214 OFFICE O/P EST MOD 30 MIN: CPT | Performed by: NURSE PRACTITIONER

## 2022-09-27 ASSESSMENT — FIBROSIS 4 INDEX: FIB4 SCORE: 2.22

## 2022-09-27 NOTE — LETTER
"     [facility]   [address], [address]  [place] [address]  Phone: [phone]  Fax: [phone]              Jose Fallon  1950    Encounter Date: 2022    THANH Mcgill          PROGRESS NOTE:        Cardiology Clinic Follow-up Note    Date of note:    2022  Primary Care Provider: Korey Onofre M.D.    Name:             Jose Fallon  YOB: 1950  MRN:               [id number]    CC: Yearly follow-up for hypertension, aortic atherosclerosis carotid atherosclerosis,     Primary Cardiologist: Dr. Azul    Patient HPI:   Jose Fallon is a 72 y.o. male with current medical problems including coronary artery calcification, hyperlipidemia, hypertension, CAC score of 377 in 2018, and right to left shunt on echocardiogram \"suggestive of PFO\"    Interim History:  Mr. Fallon was last seen in this cardiology office by Dr Azul on  21.  At that time he was continued on aspirin simvastatin and Zetia, BP well controlled on irbesartan and hydrochlorothiazide    Patient endorses medication compliance    Going to be having a total left knee replacement, MARLENE Dr. Villafuerte     Review of systems:  All others systems reviewed and negative except for what is outlined in the above HPI    Past Medical History:   Diagnosis Date   • Agatston CAC score 200-399 2018    CAC 2018: LMA 0, LCX 63.5, .1, RCA 13.4, PDA 0 (total 377.7)   • Anxiety     on Lexapro since    • B12 deficiency 2018   • Carotid atherosclerosis, bilateral 2019   • Headache     \"life long\" per pt   • History of colon polyps 2019   • Hyperlipidemia    • Hypertension    • Vertigo      Past Surgical History:   Procedure Laterality Date   • HERNIA REPAIR  2006     Family History   Problem Relation Age of Onset   • Dementia Mother 70         @ 81 years   • Hypertension Father 78         @ 79 years   • Cancer Father 77        " Colon Cancer   • Kidney Disease Father 78        Kidney failure was cause of death   • Breast Cancer Sister 50   • Heart Attack Paternal Grandmother 78        Fatal MI   • Heart Attack Paternal Grandfather 60        Fatal MI     Social History     Socioeconomic History   • Marital status:      Spouse name: Not on file   • Number of children: Not on file   • Years of education: Not on file   • Highest education level: Not on file   Occupational History   • Occupation:      Comment: [employer]   Tobacco Use   • Smoking status: Former     Packs/day: 1.00     Years: 2.00     Pack years: 2.00     Types: Cigarettes     Start date: 1957     Quit date: 1959     Years since quittin.8   • Smokeless tobacco: Never   Substance and Sexual Activity   • Alcohol use: Yes     Alcohol/week: 4.2 oz     Types: 7 Cans of beer per week   • Drug use: No   • Sexual activity: Not Currently   Other Topics Concern   • Not on file   Social History Narrative   • Not on file     Social Determinants of Health     Financial Resource Strain: Not on file   Food Insecurity: Not on file   Transportation Needs: Not on file   Physical Activity: Not on file   Stress: Not on file   Social Connections: Not on file   Intimate Partner Violence: Not on file   Housing Stability: Not on file     No Known Allergies  Current Outpatient Medications   Medication Sig Dispense Refill   • simvastatin (ZOCOR) 20 MG Tab TAKE ONE TABLET BY MOUTH EVERY DAY 90 Tablet 3   • escitalopram (LEXAPRO) 10 MG Tab TAKE 1 & 1/2 TABLET BY MOUTH ONCE DAILY (Patient taking differently: 10 mg.) 135 Tablet 3   • metFORMIN ER (GLUCOPHAGE XR) 500 MG TABLET SR 24 HR TAKE ONE TABLET BY MOUTH EVERY DAY 90 Tablet 3   • irbesartan-hydrochlorothiazide (AVALIDE) 150-12.5 MG per tablet TAKE ONE TABLET BY MOUTH EVERY DAY 90 Tablet 3   • ezetimibe (ZETIA) 10 MG Tab TAKE ONE TABLET BY MOUTH ONCE DAILY 90 Tablet 3   • vitamin D (CHOLECALCIFEROL) 1000 Unit (25  mcg) Tab Take 1,000 Units by mouth every day.     • Coenzyme Q10 200 MG Cap Take 1 Cap by mouth every day. 100 Cap 3   • B Complex Vitamins (B COMPLEX 1 PO)      • aspirin EC (ECOTRIN) 81 MG Tablet Delayed Response Take 1 Tab by mouth.     • Multiple Vitamins-Minerals (OCUVITE ADULT 50+) Cap Take 1 Each by mouth.       No current facility-administered medications for this visit.       Physical Exam:  Ambulatory Vitals  /64 (BP Location: Left arm, Patient Position: Sitting, BP Cuff Size: Adult)   Pulse 83   Resp 14   Ht 1.829 m (6')   Wt 77.6 kg (171 lb)   SpO2 95%    BP Readings from Last 4 Encounters:   09/27/22 102/64   09/26/22 103/66   04/29/22 116/62   04/04/22 102/60       Weight/BMI: Body mass index is 23.19 kg/m².  Wt Readings from Last 4 Encounters:   09/27/22 77.6 kg (171 lb)   09/26/22 79.4 kg (175 lb)   09/15/22 79.8 kg (176 lb)   04/29/22 82.6 kg (182 lb)     General: No apparent distress. Well nourished.   Neck: No JVD. No caroid bruits, trachea midline  Lungs: CTAB. Normal effort, without crackles/rhonchi, no wheezing  Heart: RRR. Normal S1/S2, no murmur, no rub. no lower extremity edema. 2+ radial pulses, 2+ DT pulses  Ext: No clubbing or cyanosis.  Abdomen: soft, non tender, non distended, no robert hepatomegaly.  Neurological: No focal deficits, no facial asymmetry.  Normal speech.  Psychiatric: Appropriate affect, alert and oriented x 4.   Skin: Warm and dry, no rash.    Lab Data Review:  Lab Results   Component Value Date/Time    CHOLSTRLTOT 125 04/15/2022 08:00 AM    LDL 57 04/15/2022 08:00 AM    HDL 57 04/15/2022 08:00 AM    TRIGLYCERIDE 54 04/15/2022 08:00 AM       Lab Results   Component Value Date/Time    SODIUM 139 04/15/2022 08:00 AM    POTASSIUM 3.6 04/15/2022 08:00 AM    CHLORIDE 102 04/15/2022 08:00 AM    CO2 26 04/15/2022 08:00 AM    GLUCOSE 88 04/15/2022 08:00 AM    BUN 18 04/15/2022 08:00 AM    CREATININE 1.13 04/15/2022 08:00 AM     Lab Results   Component Value Date/Time     ALKPHOSPHAT 62 04/15/2022 08:00 AM    ASTSGOT 34 04/15/2022 08:00 AM    ALTSGPT 21 04/15/2022 08:00 AM    TBILIRUBIN 0.6 04/15/2022 08:00 AM      Lab Results   Component Value Date/Time    WBC 5.4 04/15/2022 08:00 AM       Cardiac Imaging and Procedures Review:      EKG ***: My Personal interpretation reveals     Echo 1/24/2019:  Prior resting echo on 2/13/18, no change.   Normal stress echocardiogram.   Rest EF 55% with normal wall motion.   All LV segments became appropriately hyperdynamic without wall motion   abnormalities.   Average exercise tolerance for age.   Normal BP response.   Sinus rhythm, PVCs. 1.5 mm up sloping ST depression. No concerning  ischemic   ECG changes.   No chest pain during stress.   [facility] treadmill score 1, moderate risk (assume no prior CAD).     Echo 2/13/18  No prior study is available for comparison.   Normal left ventricular systolic function.  Left ventricular ejection fraction is visually estimated to be 60%.  Normal diastolic function.  Normal inferior vena cava size and inspiratory collapse.  Agitated saline contrast study was performed.  Bubbles were seen in the left atrium in 1-2 cardiac cycles suggestive   of an intra-cardiac shunt (PFO).   Aortic sclerosis without stenosis.    Stress Test ***:  ***    [facility] ***:  ***        Assessment and Clinical Decision Making:  No diagnosis found.  The following treatment plan was discussed      Plan reviewed in detail with the patient, verbalizes understanding and is in agreement.  Pt is to follow up with *** in ***  Encouraged Pt to follow up with us over the phone or electronically using my [employer] as cardiac issues/concerns arise.      PLEASE NOTE: This dictation was created using voice recognition software. I have made every reasonable attempt to correct obvious errors, but I expect that there are errors of grammar and possibly content that I did not discover before finalizing the note.       THANH Mcgill  [facility]  [phone]    Collaborating Physician: DR Augustus Villafuerte M.D.  [address] [facility] [facility]in[facility] [address]  Via In Basket

## 2022-09-27 NOTE — PROGRESS NOTES
"      Cardiology Clinic Follow-up Note    Date of note:    2022  Primary Care Provider: Korey Onofre M.D.    Name:             Jose Fallon  YOB: 1950  MRN:               [id number]    CC: Yearly follow-up for hypertension, aortic atherosclerosis carotid atherosclerosis,     Primary Cardiologist: Dr. Azul    Patient HPI:   Jose Fallon is a 72 y.o. male with current medical problems including coronary artery calcification, hyperlipidemia, hypertension, CAC score of 377 in 2018, and right to left shunt on echocardiogram \"suggestive of PFO\"    Interim History:  Mr. Fallon was last seen in this cardiology office by Dr Azul on  21.  At that time he was continued on aspirin simvastatin and Zetia, BP well controlled on irbesartan and hydrochlorothiazide.    Patient endorses medication compliance.    Going to be having a total left knee replacement, MARLENE Dr. Villafuerte.    He denies palpitations, chest pain, shortness of breath, dyspnea on exertion, dizziness or syncopal episodes, orthopnea, PND, lower extremity swelling, and recent weight gain.     Review of systems:  All others systems reviewed and negative except for what is outlined in the above HPI    Past Medical History:   Diagnosis Date    Agatston CAC score 200-399 2018    CAC 2018: LMA 0, LCX 63.5, .1, RCA 13.4, PDA 0 (total 377.7)    Anxiety     on Lexapro since     B12 deficiency 2018    Carotid atherosclerosis, bilateral 2019    Headache     \"life long\" per pt    History of colon polyps 2019    Hyperlipidemia     Hypertension     Vertigo      Past Surgical History:   Procedure Laterality Date    HERNIA REPAIR  2006     Family History   Problem Relation Age of Onset    Dementia Mother 70         @ 81 years    Hypertension Father 78         @ 79 years    Cancer Father 77        Colon Cancer    Kidney Disease Father 78        Kidney failure was cause of death    Breast Cancer Sister " 50    Heart Attack Paternal Grandmother 78        Fatal MI    Heart Attack Paternal Grandfather 60        Fatal MI     Social History     Socioeconomic History    Marital status:      Spouse name: Not on file    Number of children: Not on file    Years of education: Not on file    Highest education level: Not on file   Occupational History    Occupation:      Comment: [employer]   Tobacco Use    Smoking status: Former     Packs/day: 1.00     Years: 2.00     Pack years: 2.00     Types: Cigarettes     Start date: 1957     Quit date: 1959     Years since quittin.8    Smokeless tobacco: Never   Substance and Sexual Activity    Alcohol use: Yes     Alcohol/week: 4.2 oz     Types: 7 Cans of beer per week    Drug use: No    Sexual activity: Not Currently   Other Topics Concern    Not on file   Social History Narrative    Not on file     Social Determinants of Health     Financial Resource Strain: Not on file   Food Insecurity: Not on file   Transportation Needs: Not on file   Physical Activity: Not on file   Stress: Not on file   Social Connections: Not on file   Intimate Partner Violence: Not on file   Housing Stability: Not on file     No Known Allergies  Current Outpatient Medications   Medication Sig Dispense Refill    simvastatin (ZOCOR) 20 MG Tab TAKE ONE TABLET BY MOUTH EVERY DAY 90 Tablet 3    escitalopram (LEXAPRO) 10 MG Tab TAKE 1 & 1/2 TABLET BY MOUTH ONCE DAILY (Patient taking differently: 10 mg.) 135 Tablet 3    metFORMIN ER (GLUCOPHAGE XR) 500 MG TABLET SR 24 HR TAKE ONE TABLET BY MOUTH EVERY DAY 90 Tablet 3    irbesartan-hydrochlorothiazide (AVALIDE) 150-12.5 MG per tablet TAKE ONE TABLET BY MOUTH EVERY DAY 90 Tablet 3    ezetimibe (ZETIA) 10 MG Tab TAKE ONE TABLET BY MOUTH ONCE DAILY 90 Tablet 3    vitamin D (CHOLECALCIFEROL) 1000 Unit (25 mcg) Tab Take 1,000 Units by mouth every day.      Coenzyme Q10 200 MG Cap Take 1 Cap by mouth every day. 100 Cap 3    B Complex  Vitamins (B COMPLEX 1 PO)       aspirin EC (ECOTRIN) 81 MG Tablet Delayed Response Take 1 Tab by mouth.      Multiple Vitamins-Minerals (OCUVITE ADULT 50+) Cap Take 1 Each by mouth.       No current facility-administered medications for this visit.       Physical Exam:  Ambulatory Vitals  /64 (BP Location: Left arm, Patient Position: Sitting, BP Cuff Size: Adult)   Pulse 83   Resp 14   Ht 1.829 m (6')   Wt 77.6 kg (171 lb)   SpO2 95%    BP Readings from Last 4 Encounters:   09/27/22 102/64   09/26/22 103/66   04/29/22 116/62   04/04/22 102/60       Weight/BMI: Body mass index is 23.19 kg/m².  Wt Readings from Last 4 Encounters:   09/27/22 77.6 kg (171 lb)   09/26/22 79.4 kg (175 lb)   09/15/22 79.8 kg (176 lb)   04/29/22 82.6 kg (182 lb)     General: No apparent distress. Well nourished.   Neck: No JVD. No caroid bruits, trachea midline  Lungs: CTAB. Normal effort, without crackles/rhonchi, no wheezing  Heart: RRR. Normal S1/S2, no murmur, no rub. no lower extremity edema. 2+ radial pulses, 2+ DT pulses  Ext: No clubbing or cyanosis.  Abdomen: soft, non tender, non distended, no robert hepatomegaly.  Neurological: No focal deficits, no facial asymmetry.  Normal speech.  Psychiatric: Appropriate affect, alert and oriented x 4.   Skin: Warm and dry, no rash.    Lab Data Review:  Lab Results   Component Value Date/Time    CHOLSTRLTOT 125 04/15/2022 08:00 AM    LDL 57 04/15/2022 08:00 AM    HDL 57 04/15/2022 08:00 AM    TRIGLYCERIDE 54 04/15/2022 08:00 AM       Lab Results   Component Value Date/Time    SODIUM 139 04/15/2022 08:00 AM    POTASSIUM 3.6 04/15/2022 08:00 AM    CHLORIDE 102 04/15/2022 08:00 AM    CO2 26 04/15/2022 08:00 AM    GLUCOSE 88 04/15/2022 08:00 AM    BUN 18 04/15/2022 08:00 AM    CREATININE 1.13 04/15/2022 08:00 AM     Lab Results   Component Value Date/Time    ALKPHOSPHAT 62 04/15/2022 08:00 AM    ASTSGOT 34 04/15/2022 08:00 AM    ALTSGPT 21 04/15/2022 08:00 AM    TBILIRUBIN 0.6  "04/15/2022 08:00 AM      Lab Results   Component Value Date/Time    WBC 5.4 04/15/2022 08:00 AM       Cardiac Imaging and Procedures Review:      EKG 22: My Personal interpretation reveals SR 75    Echo 2019:  Prior resting echo on 18, no change.   Normal stress echocardiogram.   Rest EF 55% with normal wall motion.   All LV segments became appropriately hyperdynamic without wall motion   abnormalities.   Average exercise tolerance for age.   Normal BP response.   Sinus rhythm, PVCs. 1.5 mm up sloping ST depression. No concerning  ischemic   ECG changes.   No chest pain during stress.   [facility] treadmill score 1, moderate risk (assume no prior CAD).     Echo 18  No prior study is available for comparison.   Normal left ventricular systolic function.  Left ventricular ejection fraction is visually estimated to be 60%.  Normal diastolic function.  Normal inferior vena cava size and inspiratory collapse.  Agitated saline contrast study was performed.  Bubbles were seen in the left atrium in 1-2 cardiac cycles suggestive   of an intra-cardiac shunt (PFO).   Aortic sclerosis without stenosis.      Assessment and Clinical Decision Makin. Mixed hyperlipidemia        2. Agatston CAC score 200-399        3. Echocardiogram findings suggestive of PFO (patent foramen ovale)        4. Carotid atherosclerosis, bilateral        5. Aortic valve sclerosis without stenosis        6. Encounter for pre-operative cardiovascular clearance          The following treatment plan was discussed    Mixed hyperlipidemia  CAC score 377  Carotid atherosclerosis bilaterally  -Continue aspirin 81 mg  -Continue simvastatin 20 mg    Echocardiogram findings suggestive of PFO  -Echo from 2018 did show \" bubbles in left atrium for 1-2 cycles\" per notes this is \"suggestive of intra-cardiac shunt (PFO)\"  -Given no clinical symptoms it was decided to not further evaluate this \"suggestive finding\" with MAGALI    Encounter for preoperative " cardiovascular clearance  Patient is low risk of cardiovascular complications for low to moderate risk L knee surgery. He is medically optimized based on my last in person assessment, and if his symptoms have not changed, surgery should proceed without further cardiac work-up.     Plan reviewed in detail with the patient, verbalizes understanding and is in agreement.  Pt is to follow up with Dr. Kaplan in 1 year  Encouraged Pt to follow up with us over the phone or electronically using my MyChart as cardiac issues/concerns arise.      PLEASE NOTE: This dictation was created using voice recognition software. I have made every reasonable attempt to correct obvious errors, but I expect that there are errors of grammar and possibly content that I did not discover before finalizing the note.       KENYATTA Mcgill.R.N.   [facility]  [phone]    Collaborating Physician: DR Coffman

## 2022-09-27 NOTE — LETTER
"     [facility]   [address]  [place] [address]  Phone: [phone]  Fax: [phone]              Jose Fallon  1950    Encounter Date: 9/27/2022    THANH Mcgill          PROGRESS NOTE:        Cardiology Clinic Follow-up Note    Date of note:    9/27/2022  Primary Care Provider: Korey Onofre M.D.    Name:             Jose Fallon  YOB: 1950  MRN:               [id number]    CC: Yearly follow-up for hypertension, aortic atherosclerosis carotid atherosclerosis,     Primary Cardiologist: Dr. Azul    Patient HPI:   Jose Fallon is a 72 y.o. male with current medical problems including coronary artery calcification, hyperlipidemia, hypertension, CAC score of 377 in 2018, and right to left shunt on echocardiogram \"suggestive of PFO\"    Interim History:  Mr. Fallon was last seen in this cardiology office by Dr Azul on  6/7/21.  At that time he was continued on aspirin simvastatin and Zetia, BP well controlled on irbesartan and hydrochlorothiazide.    Patient endorses medication compliance.    Going to be having a total left knee replacement, MARLENE Dr. Villafuerte.    He denies palpitations, chest pain, shortness of breath, dyspnea on exertion, dizziness or syncopal episodes, orthopnea, PND, lower extremity swelling, and recent weight gain.     Review of systems:  All others systems reviewed and negative except for what is outlined in the above HPI    Past Medical History:   Diagnosis Date   • Agatston CAC score 200-399 2/8/2018    CAC Jan. 2018: LMA 0, LCX 63.5, .1, RCA 13.4, PDA 0 (total 377.7)   • Anxiety     on Lexapro since 2003   • B12 deficiency 2/8/2018   • Carotid atherosclerosis, bilateral 2/13/2019   • Headache     \"life long\" per pt   • History of colon polyps 11/6/2019   • Hyperlipidemia    • Hypertension    • Vertigo 2015     Past Surgical History:   Procedure Laterality Date   • HERNIA REPAIR  "      Family History   Problem Relation Age of Onset   • Dementia Mother 70         @ 81 years   • Hypertension Father 78         @ 79 years   • Cancer Father 77        Colon Cancer   • Kidney Disease Father 78        Kidney failure was cause of death   • Breast Cancer Sister 50   • Heart Attack Paternal Grandmother 78        Fatal MI   • Heart Attack Paternal Grandfather 60        Fatal MI     Social History     Socioeconomic History   • Marital status:      Spouse name: Not on file   • Number of children: Not on file   • Years of education: Not on file   • Highest education level: Not on file   Occupational History   • Occupation:      Comment: [employer]   Tobacco Use   • Smoking status: Former     Packs/day: 1.00     Years: 2.00     Pack years: 2.00     Types: Cigarettes     Start date: 1957     Quit date: 1959     Years since quittin.8   • Smokeless tobacco: Never   Substance and Sexual Activity   • Alcohol use: Yes     Alcohol/week: 4.2 oz     Types: 7 Cans of beer per week   • Drug use: No   • Sexual activity: Not Currently   Other Topics Concern   • Not on file   Social History Narrative   • Not on file     Social Determinants of Health     Financial Resource Strain: Not on file   Food Insecurity: Not on file   Transportation Needs: Not on file   Physical Activity: Not on file   Stress: Not on file   Social Connections: Not on file   Intimate Partner Violence: Not on file   Housing Stability: Not on file     No Known Allergies  Current Outpatient Medications   Medication Sig Dispense Refill   • simvastatin (ZOCOR) 20 MG Tab TAKE ONE TABLET BY MOUTH EVERY DAY 90 Tablet 3   • escitalopram (LEXAPRO) 10 MG Tab TAKE 1 & 1/2 TABLET BY MOUTH ONCE DAILY (Patient taking differently: 10 mg.) 135 Tablet 3   • metFORMIN ER (GLUCOPHAGE XR) 500 MG TABLET SR 24 HR TAKE ONE TABLET BY MOUTH EVERY DAY 90 Tablet 3   • irbesartan-hydrochlorothiazide (AVALIDE) 150-12.5 MG per  tablet TAKE ONE TABLET BY MOUTH EVERY DAY 90 Tablet 3   • ezetimibe (ZETIA) 10 MG Tab TAKE ONE TABLET BY MOUTH ONCE DAILY 90 Tablet 3   • vitamin D (CHOLECALCIFEROL) 1000 Unit (25 mcg) Tab Take 1,000 Units by mouth every day.     • Coenzyme Q10 200 MG Cap Take 1 Cap by mouth every day. 100 Cap 3   • B Complex Vitamins (B COMPLEX 1 PO)      • aspirin EC (ECOTRIN) 81 MG Tablet Delayed Response Take 1 Tab by mouth.     • Multiple Vitamins-Minerals (OCUVITE ADULT 50+) Cap Take 1 Each by mouth.       No current facility-administered medications for this visit.       Physical Exam:  Ambulatory Vitals  /64 (BP Location: Left arm, Patient Position: Sitting, BP Cuff Size: Adult)   Pulse 83   Resp 14   Ht 1.829 m (6')   Wt 77.6 kg (171 lb)   SpO2 95%    BP Readings from Last 4 Encounters:   09/27/22 102/64   09/26/22 103/66   04/29/22 116/62   04/04/22 102/60       Weight/BMI: Body mass index is 23.19 kg/m².  Wt Readings from Last 4 Encounters:   09/27/22 77.6 kg (171 lb)   09/26/22 79.4 kg (175 lb)   09/15/22 79.8 kg (176 lb)   04/29/22 82.6 kg (182 lb)     General: No apparent distress. Well nourished.   Neck: No JVD. No caroid bruits, trachea midline  Lungs: CTAB. Normal effort, without crackles/rhonchi, no wheezing  Heart: RRR. Normal S1/S2, no murmur, no rub. no lower extremity edema. 2+ radial pulses, 2+ DT pulses  Ext: No clubbing or cyanosis.  Abdomen: soft, non tender, non distended, no robert hepatomegaly.  Neurological: No focal deficits, no facial asymmetry.  Normal speech.  Psychiatric: Appropriate affect, alert and oriented x 4.   Skin: Warm and dry, no rash.    Lab Data Review:  Lab Results   Component Value Date/Time    CHOLSTRLTOT 125 04/15/2022 08:00 AM    LDL 57 04/15/2022 08:00 AM    HDL 57 04/15/2022 08:00 AM    TRIGLYCERIDE 54 04/15/2022 08:00 AM       Lab Results   Component Value Date/Time    SODIUM 139 04/15/2022 08:00 AM    POTASSIUM 3.6 04/15/2022 08:00 AM    CHLORIDE 102 04/15/2022 08:00  AM    CO2 26 04/15/2022 08:00 AM    GLUCOSE 88 04/15/2022 08:00 AM    BUN 18 04/15/2022 08:00 AM    CREATININE 1.13 04/15/2022 08:00 AM     Lab Results   Component Value Date/Time    ALKPHOSPHAT 62 04/15/2022 08:00 AM    ASTSGOT 34 04/15/2022 08:00 AM    ALTSGPT 21 04/15/2022 08:00 AM    TBILIRUBIN 0.6 04/15/2022 08:00 AM      Lab Results   Component Value Date/Time    WBC 5.4 04/15/2022 08:00 AM       Cardiac Imaging and Procedures Review:      EKG 22: My Personal interpretation reveals SR 75    Echo 2019:  Prior resting echo on 18, no change.   Normal stress echocardiogram.   Rest EF 55% with normal wall motion.   All LV segments became appropriately hyperdynamic without wall motion   abnormalities.   Average exercise tolerance for age.   Normal BP response.   Sinus rhythm, PVCs. 1.5 mm up sloping ST depression. No concerning  ischemic   ECG changes.   No chest pain during stress.   [facility] treadmill score 1, moderate risk (assume no prior CAD).     Echo 18  No prior study is available for comparison.   Normal left ventricular systolic function.  Left ventricular ejection fraction is visually estimated to be 60%.  Normal diastolic function.  Normal inferior vena cava size and inspiratory collapse.  Agitated saline contrast study was performed.  Bubbles were seen in the left atrium in 1-2 cardiac cycles suggestive   of an intra-cardiac shunt (PFO).   Aortic sclerosis without stenosis.      Assessment and Clinical Decision Makin. Mixed hyperlipidemia        2. Agatston CAC score 200-399        3. Echocardiogram findings suggestive of PFO (patent foramen ovale)        4. Carotid atherosclerosis, bilateral        5. Aortic valve sclerosis without stenosis        6. Encounter for pre-operative cardiovascular clearance          The following treatment plan was discussed    Mixed hyperlipidemia  CAC score 377  Carotid atherosclerosis bilaterally  -Continue aspirin 81 mg  -Continue simvastatin 20  "mg    Echocardiogram findings suggestive of PFO  -Echo from 2018 did show \" bubbles in left atrium for 1-2 cycles\" per notes this is \"suggestive of intra-cardiac shunt (PFO)\"  -Given no clinical symptoms it was decided to not further evaluate this \"suggestive finding\" with MAGALI    Encounter for preoperative cardiovascular clearance  Patient is low risk of cardiovascular complications for low to moderate risk L knee surgery. He is medically optimized based on my last in person assessment, and if his symptoms have not changed, surgery should proceed without further cardiac work-up.     Plan reviewed in detail with the patient, verbalizes understanding and is in agreement.  Pt is to follow up with Dr. Kaplan in 1 year  Encouraged Pt to follow up with us over the phone or electronically using my [employer] as cardiac issues/concerns arise.      PLEASE NOTE: This dictation was created using voice recognition software. I have made every reasonable attempt to correct obvious errors, but I expect that there are errors of grammar and possibly content that I did not discover before finalizing the note.       ANGIE McgillRMiladisN.   [facility]  [phone]    Collaborating Physician: DR Augustus Villafuerte M.D.  [address]  [place] [address]  Via In Basket              "

## 2022-09-29 ENCOUNTER — TELEPHONE (OUTPATIENT)
Dept: CARDIOLOGY | Facility: MEDICAL CENTER | Age: 72
End: 2022-09-29
Payer: COMMERCIAL

## 2022-09-29 NOTE — TELEPHONE ENCOUNTER
Message  Received: Yesterday  THANH Mcgill R.N.  I placed preoperative clearance recommendations in my progress note yesterday.  I believe I also forwarded my note to his orthopedic surgeon at [facility]     Thanks,     Lucy     --------------------------------------------------------    MARLENE clearance form completed and faxed bck to [phone]

## 2022-10-18 ENCOUNTER — HOSPITAL ENCOUNTER (OUTPATIENT)
Dept: RADIOLOGY | Facility: MEDICAL CENTER | Age: 72
End: 2022-10-18
Attending: ORTHOPAEDIC SURGERY
Payer: COMMERCIAL

## 2022-10-18 DIAGNOSIS — M17.12 PRIMARY OSTEOARTHRITIS OF LEFT KNEE: ICD-10-CM

## 2022-10-18 PROCEDURE — 73700 CT LOWER EXTREMITY W/O DYE: CPT | Mod: LT

## 2022-10-21 ENCOUNTER — HOSPITAL ENCOUNTER (OUTPATIENT)
Facility: MEDICAL CENTER | Age: 72
End: 2022-10-21
Attending: INTERNAL MEDICINE
Payer: COMMERCIAL

## 2022-10-21 ENCOUNTER — NON-PROVIDER VISIT (OUTPATIENT)
Dept: INTERNAL MEDICINE | Facility: IMAGING CENTER | Age: 72
End: 2022-10-21
Payer: COMMERCIAL

## 2022-10-21 DIAGNOSIS — Z01.89 ENCOUNTER FOR ROUTINE LABORATORY TESTING: ICD-10-CM

## 2022-10-21 DIAGNOSIS — E78.00 HYPERCHOLESTEROLEMIA: ICD-10-CM

## 2022-10-21 DIAGNOSIS — Z23 NEED FOR INFLUENZA VACCINATION: ICD-10-CM

## 2022-10-21 DIAGNOSIS — R74.8 ELEVATED CPK: ICD-10-CM

## 2022-10-21 DIAGNOSIS — I10 ESSENTIAL HYPERTENSION: ICD-10-CM

## 2022-10-21 DIAGNOSIS — D64.9 ANEMIA, UNSPECIFIED TYPE: ICD-10-CM

## 2022-10-21 LAB
ALBUMIN SERPL BCP-MCNC: 4.1 G/DL (ref 3.2–4.9)
ALBUMIN/GLOB SERPL: 1.3 G/DL
ALP SERPL-CCNC: 60 U/L (ref 30–99)
ALT SERPL-CCNC: 20 U/L (ref 2–50)
ANION GAP SERPL CALC-SCNC: 8 MMOL/L (ref 7–16)
AST SERPL-CCNC: 24 U/L (ref 12–45)
BASOPHILS # BLD AUTO: 0.6 % (ref 0–1.8)
BASOPHILS # BLD: 0.03 K/UL (ref 0–0.12)
BILIRUB SERPL-MCNC: 0.6 MG/DL (ref 0.1–1.5)
BUN SERPL-MCNC: 20 MG/DL (ref 8–22)
CALCIUM SERPL-MCNC: 8.9 MG/DL (ref 8.5–10.5)
CHLORIDE SERPL-SCNC: 104 MMOL/L (ref 96–112)
CHOLEST SERPL-MCNC: 118 MG/DL (ref 100–199)
CK SERPL-CCNC: 221 U/L (ref 0–154)
CO2 SERPL-SCNC: 28 MMOL/L (ref 20–33)
CREAT SERPL-MCNC: 1.09 MG/DL (ref 0.5–1.4)
EOSINOPHIL # BLD AUTO: 0.49 K/UL (ref 0–0.51)
EOSINOPHIL NFR BLD: 9.9 % (ref 0–6.9)
ERYTHROCYTE [DISTWIDTH] IN BLOOD BY AUTOMATED COUNT: 43.5 FL (ref 35.9–50)
FERRITIN SERPL-MCNC: 251 NG/ML (ref 22–322)
GFR SERPLBLD CREATININE-BSD FMLA CKD-EPI: 71 ML/MIN/1.73 M 2
GLOBULIN SER CALC-MCNC: 3.2 G/DL (ref 1.9–3.5)
GLUCOSE SERPL-MCNC: 86 MG/DL (ref 65–99)
HCT VFR BLD AUTO: 40.4 % (ref 42–52)
HDLC SERPL-MCNC: 56 MG/DL
HGB BLD-MCNC: 13.2 G/DL (ref 14–18)
IMM GRANULOCYTES # BLD AUTO: 0.02 K/UL (ref 0–0.11)
IMM GRANULOCYTES NFR BLD AUTO: 0.4 % (ref 0–0.9)
IRON SATN MFR SERPL: 29 % (ref 15–55)
IRON SERPL-MCNC: 75 UG/DL (ref 50–180)
LDLC SERPL CALC-MCNC: 49 MG/DL
LYMPHOCYTES # BLD AUTO: 1.36 K/UL (ref 1–4.8)
LYMPHOCYTES NFR BLD: 27.6 % (ref 22–41)
MCH RBC QN AUTO: 30.1 PG (ref 27–33)
MCHC RBC AUTO-ENTMCNC: 32.7 G/DL (ref 33.7–35.3)
MCV RBC AUTO: 92 FL (ref 81.4–97.8)
MONOCYTES # BLD AUTO: 0.41 K/UL (ref 0–0.85)
MONOCYTES NFR BLD AUTO: 8.3 % (ref 0–13.4)
NEUTROPHILS # BLD AUTO: 2.62 K/UL (ref 1.82–7.42)
NEUTROPHILS NFR BLD: 53.2 % (ref 44–72)
NRBC # BLD AUTO: 0 K/UL
NRBC BLD-RTO: 0 /100 WBC
PLATELET # BLD AUTO: 243 K/UL (ref 164–446)
PMV BLD AUTO: 10.5 FL (ref 9–12.9)
POTASSIUM SERPL-SCNC: 3.8 MMOL/L (ref 3.6–5.5)
PROT SERPL-MCNC: 7.3 G/DL (ref 6–8.2)
RBC # BLD AUTO: 4.39 M/UL (ref 4.7–6.1)
SODIUM SERPL-SCNC: 140 MMOL/L (ref 135–145)
TIBC SERPL-MCNC: 256 UG/DL (ref 250–450)
TRIGL SERPL-MCNC: 67 MG/DL (ref 0–149)
UIBC SERPL-MCNC: 181 UG/DL (ref 110–370)
WBC # BLD AUTO: 4.9 K/UL (ref 4.8–10.8)

## 2022-10-21 PROCEDURE — 90662 IIV NO PRSV INCREASED AG IM: CPT | Performed by: INTERNAL MEDICINE

## 2022-10-21 PROCEDURE — 83540 ASSAY OF IRON: CPT

## 2022-10-21 PROCEDURE — 82728 ASSAY OF FERRITIN: CPT

## 2022-10-21 PROCEDURE — 80053 COMPREHEN METABOLIC PANEL: CPT

## 2022-10-21 PROCEDURE — 84165 PROTEIN E-PHORESIS SERUM: CPT

## 2022-10-21 PROCEDURE — 82550 ASSAY OF CK (CPK): CPT

## 2022-10-21 PROCEDURE — 84155 ASSAY OF PROTEIN SERUM: CPT

## 2022-10-21 PROCEDURE — 90471 IMMUNIZATION ADMIN: CPT | Performed by: INTERNAL MEDICINE

## 2022-10-21 PROCEDURE — 80061 LIPID PANEL: CPT

## 2022-10-21 PROCEDURE — 83550 IRON BINDING TEST: CPT

## 2022-10-21 PROCEDURE — 85025 COMPLETE CBC W/AUTO DIFF WBC: CPT

## 2022-10-21 PROCEDURE — 86334 IMMUNOFIX E-PHORESIS SERUM: CPT

## 2022-10-26 LAB
ALBUMIN SERPL ELPH-MCNC: 3.81 G/DL (ref 3.75–5.01)
ALPHA1 GLOB SERPL ELPH-MCNC: 0.32 G/DL (ref 0.19–0.46)
ALPHA2 GLOB SERPL ELPH-MCNC: 0.61 G/DL (ref 0.48–1.05)
B-GLOBULIN SERPL ELPH-MCNC: 0.88 G/DL (ref 0.48–1.1)
GAMMA GLOB SERPL ELPH-MCNC: 1.68 G/DL (ref 0.62–1.51)
INTERPRETATION SERPL IFE-IMP: ABNORMAL
INTERPRETATION SERPL IFE-IMP: ABNORMAL
MONOCLONAL PROTEIN NL11656: ABNORMAL G/DL
PATHOLOGY STUDY: ABNORMAL
PROT SERPL-MCNC: 7.3 G/DL (ref 6.3–8.2)

## 2022-11-04 ENCOUNTER — OFFICE VISIT (OUTPATIENT)
Dept: INTERNAL MEDICINE | Facility: IMAGING CENTER | Age: 72
End: 2022-11-04
Payer: COMMERCIAL

## 2022-11-04 VITALS
RESPIRATION RATE: 14 BRPM | WEIGHT: 173 LBS | HEART RATE: 80 BPM | TEMPERATURE: 98.5 F | BODY MASS INDEX: 23.43 KG/M2 | OXYGEN SATURATION: 97 % | HEIGHT: 72 IN | DIASTOLIC BLOOD PRESSURE: 60 MMHG | SYSTOLIC BLOOD PRESSURE: 100 MMHG

## 2022-11-04 DIAGNOSIS — R74.8 ELEVATED CPK: ICD-10-CM

## 2022-11-04 DIAGNOSIS — D64.9 ANEMIA, UNSPECIFIED TYPE: ICD-10-CM

## 2022-11-04 DIAGNOSIS — Z86.39 HISTORY OF NON ANEMIC VITAMIN B12 DEFICIENCY: ICD-10-CM

## 2022-11-04 DIAGNOSIS — Z86.39 HISTORY OF VITAMIN D DEFICIENCY: ICD-10-CM

## 2022-11-04 DIAGNOSIS — Z12.5 SCREENING FOR PROSTATE CANCER: ICD-10-CM

## 2022-11-04 DIAGNOSIS — Z00.00 WELLNESS EXAMINATION: ICD-10-CM

## 2022-11-04 DIAGNOSIS — E78.00 HYPERCHOLESTEROLEMIA: ICD-10-CM

## 2022-11-04 DIAGNOSIS — I10 ESSENTIAL HYPERTENSION: ICD-10-CM

## 2022-11-04 PROCEDURE — 99214 OFFICE O/P EST MOD 30 MIN: CPT | Performed by: INTERNAL MEDICINE

## 2022-11-04 RX ORDER — ESCITALOPRAM OXALATE 10 MG/1
10 TABLET ORAL DAILY
COMMUNITY
End: 2023-03-28 | Stop reason: SDUPTHER

## 2022-11-04 RX ORDER — IRBESARTAN AND HYDROCHLOROTHIAZIDE 150; 12.5 MG/1; MG/1
0.5 TABLET, FILM COATED ORAL DAILY
Qty: 45 TABLET | Refills: 3
Start: 2022-11-04 | End: 2023-02-21 | Stop reason: SDUPTHER

## 2022-11-04 ASSESSMENT — PATIENT HEALTH QUESTIONNAIRE - PHQ9: CLINICAL INTERPRETATION OF PHQ2 SCORE: 0

## 2022-11-04 ASSESSMENT — ENCOUNTER SYMPTOMS: MYALGIAS: 0

## 2022-11-04 ASSESSMENT — FIBROSIS 4 INDEX: FIB4 SCORE: 1.61

## 2022-11-04 NOTE — PROGRESS NOTES
"Established Patient Note   HPI:        Mani returns today to follow up HTN and hyperlipidemia; he has done recent lab work.    Past Medical History:   Diagnosis Date    Agatston CAC score 200-399 2018    CAC 2018: LMA 0, LCX 63.5, .1, RCA 13.4, PDA 0 (total 377.7)    Anxiety     on Lexapro since     B12 deficiency 2018    Carotid atherosclerosis, bilateral 2019    Headache     \"life long\" per pt    History of colon polyps 2019    Hyperlipidemia     Hypertension     Vertigo        Current Outpatient Medications   Medication Sig Dispense Refill    escitalopram (LEXAPRO) 10 MG Tab Take 10 mg by mouth every day.      irbesartan-hydrochlorothiazide (AVALIDE) 150-12.5 MG per tablet Take 0.5 Tablets by mouth every day. 45 Tablet 3    simvastatin (ZOCOR) 20 MG Tab TAKE ONE TABLET BY MOUTH EVERY DAY 90 Tablet 3    metFORMIN ER (GLUCOPHAGE XR) 500 MG TABLET SR 24 HR TAKE ONE TABLET BY MOUTH EVERY DAY 90 Tablet 3    ezetimibe (ZETIA) 10 MG Tab TAKE ONE TABLET BY MOUTH ONCE DAILY (Patient taking differently: Take 5 mg by mouth every day.) 90 Tablet 3    vitamin D (CHOLECALCIFEROL) 1000 Unit (25 mcg) Tab Take 1,000 Units by mouth every day.      Coenzyme Q10 200 MG Cap Take 1 Cap by mouth every day. 100 Cap 3    B Complex Vitamins (B COMPLEX 1 PO)       aspirin EC (ECOTRIN) 81 MG Tablet Delayed Response Take 1 Tab by mouth.      Multiple Vitamins-Minerals (OCUVITE ADULT 50+) Cap Take 1 Each by mouth.       No current facility-administered medications for this visit.         No Known Allergies      Social History     Tobacco Use    Smoking status: Former     Packs/day: 1.00     Years: 2.00     Pack years: 2.00     Types: Cigarettes     Start date: 1957     Quit date: 1959     Years since quittin.9    Smokeless tobacco: Never   Substance Use Topics    Alcohol use: Yes     Alcohol/week: 4.2 oz     Types: 7 Cans of beer per week    Drug use: No       Past Surgical History: "   Procedure Laterality Date    HERNIA REPAIR  2006        Review of Systems   Cardiovascular:         No orthostatic lightheadedness   Musculoskeletal:  Negative for myalgias.        No generalized muscle weakness     Ambulatory Vitals  /60 (BP Location: Left arm, Patient Position: Sitting, BP Cuff Size: Adult)   Pulse 80   Temp 36.9 °C (98.5 °F) (Temporal)   Resp 14   Ht 1.829 m (6')   Wt 78.5 kg (173 lb)   SpO2 97%   BMI 23.46 kg/m²     Physical Exam  Constitutional:       Appearance: Normal appearance.   Neck:      Vascular: No carotid bruit.   Cardiovascular:      Rate and Rhythm: Normal rate and regular rhythm.      Heart sounds: Normal heart sounds. No murmur heard.    No friction rub. No gallop.   Pulmonary:      Effort: Pulmonary effort is normal.      Breath sounds: Normal breath sounds. No wheezing or rales.   Abdominal:      General: There is no distension.      Palpations: Abdomen is soft. There is no mass.      Tenderness: There is no abdominal tenderness. There is no right CVA tenderness or left CVA tenderness.   Musculoskeletal:      Right lower leg: No edema.      Left lower leg: No edema.   Neurological:      General: No focal deficit present.       Component      Latest Ref Rng & Units 4/15/2022 4/29/2022 9/26/2022   WBC      4.8 - 10.8 K/uL 5.4     RBC      4.70 - 6.10 M/uL 4.45 (L)     Hemoglobin      14.0 - 18.0 g/dL 13.4 (L)     Hematocrit      42.0 - 52.0 % 41.1 (L)     MCV      81.4 - 97.8 fL 92.4     MCH      27.0 - 33.0 pg 30.1     MCHC      33.7 - 35.3 g/dL 32.6 (L)     RDW      35.9 - 50.0 fL 42.8     Platelet Count      164 - 446 K/uL 244     MPV      9.0 - 12.9 fL 10.2     Neutrophils-Polys      44.00 - 72.00 % 59.00     Lymphocytes      22.00 - 41.00 % 27.30     Monocytes      0.00 - 13.40 % 8.10     Eosinophils      0.00 - 6.90 % 4.60     Basophils      0.00 - 1.80 % 0.60     Immature Granulocytes      0.00 - 0.90 % 0.40     Nucleated RBC      /100 WBC 0.00      Neutrophils (Absolute)      1.82 - 7.42 K/uL 3.21     Lymphs (Absolute)      1.00 - 4.80 K/uL 1.48     Monos (Absolute)      0.00 - 0.85 K/uL 0.44     Eos (Absolute)      0.00 - 0.51 K/uL 0.25     Baso (Absolute)      0.00 - 0.12 K/uL 0.03     Immature Granulocytes (abs)      0.00 - 0.11 K/uL 0.02     NRBC (Absolute)      K/uL 0.00     Sodium      135 - 145 mmol/L 139     Potassium      3.6 - 5.5 mmol/L 3.6     Chloride      96 - 112 mmol/L 102     Co2      20 - 33 mmol/L 26     Anion Gap      7.0 - 16.0 11.0     Glucose      65 - 99 mg/dL 88     Bun      8 - 22 mg/dL 18     Creatinine      0.50 - 1.40 mg/dL 1.13     Calcium      8.5 - 10.5 mg/dL 9.1     AST(SGOT)      12 - 45 U/L 34     ALT(SGPT)      2 - 50 U/L 21     Alkaline Phosphatase      30 - 99 U/L 62     Total Bilirubin      0.1 - 1.5 mg/dL 0.6     Albumin      3.2 - 4.9 g/dL 4.0     Total Protein      6.0 - 8.2 g/dL 7.1     Globulin      1.9 - 3.5 g/dL 3.1     A-G Ratio      g/dL 1.3     Alanine aminotransferase (ALT)      0 - 55   30   Albumin      3.3 - 5.5   3.5   Alkaline phosphatase      25 - 150   52   Aspartate aminotransferase (AST)      0 - 40   36   Calcium      8 - 10.3   9.1   Chloride      98 - 108   106   Creatinine      0.6 - 1.2   0.9   Glucose      75 - 120   101   Potassium      3.6 - 5.1   4.1   Sodium      133 - 145   145   Total bilirubin      0.2 - 1.6   1.0   Total carbon dioxide      18 - 33   29   Total protein      6.4 - 8.1   7.1   Blood Urea Nitrogen      7 - 22   19   Color        Yellow    Character        Clear    Specific Gravity      <1.035  1.015    Ph      5.0 - 8.0  5.0    Glucose      Negative mg/dL  Negative    Ketones      Negative mg/dL  Negative    Protein      Negative mg/dL  Negative    Bilirubin      Negative  Negative    Urobilinogen, Urine      Negative  0.2    Nitrite      Negative  Negative    Leukocyte Esterase      Negative  Negative    Occult Blood      Negative  Negative    Micro Urine Req        see  below    Albumin      3.75 - 5.01 g/dL      Alpha-1 Globulin      0.19 - 0.46 g/dL      Alpha-2 Globulin      0.48 - 1.05 g/dL      Beta Globulin      0.48 - 1.10 g/dL      Gamma Globulin      0.62 - 1.51 g/dL      Monoclonal Protein      g/dL      Total Protein, Serum      6.3 - 8.2 g/dL      Immunofixation            Interpretation            EER Monoclonal Protein Study, Ser            Cholesterol,Tot      100 - 199 mg/dL 125     Triglycerides      0 - 149 mg/dL 54     HDL      >=40 mg/dL 57     LDL      <100 mg/dL 57     Iron      50 - 180 ug/dL      Total Iron Binding      250 - 450 ug/dL      Unsat Iron Binding      110 - 370 ug/dL      % Saturation      15 - 55 %      Glycohemoglobin      0.0 - 5.6 % 5.5  5.6   Estim. Avg Glu      mg/dL 111     Vitamin B12 -True Cobalamin      211 - 911 pg/mL 856     25-Hydroxy   Vitamin D 25      30 - 100 ng/mL 44     Prostatic Specific Antigen Tot      0.00 - 4.00 ng/mL 1.05     TSH      0.380 - 5.330 uIU/mL 2.680     CPK Total      0 - 154 U/L 251 (H)     GFR (CKD-EPI)      >60 mL/min/1.73 m 2 69     Ferritin      22.0 - 322.0 ng/mL        Component      Latest Ref Rng & Units 10/21/2022   WBC      4.8 - 10.8 K/uL 4.9   RBC      4.70 - 6.10 M/uL 4.39 (L)   Hemoglobin      14.0 - 18.0 g/dL 13.2 (L)   Hematocrit      42.0 - 52.0 % 40.4 (L)   MCV      81.4 - 97.8 fL 92.0   MCH      27.0 - 33.0 pg 30.1   MCHC      33.7 - 35.3 g/dL 32.7 (L)   RDW      35.9 - 50.0 fL 43.5   Platelet Count      164 - 446 K/uL 243   MPV      9.0 - 12.9 fL 10.5   Neutrophils-Polys      44.00 - 72.00 % 53.20   Lymphocytes      22.00 - 41.00 % 27.60   Monocytes      0.00 - 13.40 % 8.30   Eosinophils      0.00 - 6.90 % 9.90 (H)   Basophils      0.00 - 1.80 % 0.60   Immature Granulocytes      0.00 - 0.90 % 0.40   Nucleated RBC      /100 WBC 0.00   Neutrophils (Absolute)      1.82 - 7.42 K/uL 2.62   Lymphs (Absolute)      1.00 - 4.80 K/uL 1.36   Monos (Absolute)      0.00 - 0.85 K/uL 0.41   Eos  (Absolute)      0.00 - 0.51 K/uL 0.49   Baso (Absolute)      0.00 - 0.12 K/uL 0.03   Immature Granulocytes (abs)      0.00 - 0.11 K/uL 0.02   NRBC (Absolute)      K/uL 0.00   Sodium      135 - 145 mmol/L 140   Potassium      3.6 - 5.5 mmol/L 3.8   Chloride      96 - 112 mmol/L 104   Co2      20 - 33 mmol/L 28   Anion Gap      7.0 - 16.0 8.0   Glucose      65 - 99 mg/dL 86   Bun      8 - 22 mg/dL 20   Creatinine      0.50 - 1.40 mg/dL 1.09   Calcium      8.5 - 10.5 mg/dL 8.9   AST(SGOT)      12 - 45 U/L 24   ALT(SGPT)      2 - 50 U/L 20   Alkaline Phosphatase      30 - 99 U/L 60   Total Bilirubin      0.1 - 1.5 mg/dL 0.6   Albumin      3.2 - 4.9 g/dL 4.1   Total Protein      6.0 - 8.2 g/dL 7.3   Globulin      1.9 - 3.5 g/dL 3.2   A-G Ratio      g/dL 1.3   Alanine aminotransferase (ALT)      0 - 55    Albumin      3.3 - 5.5    Alkaline phosphatase      25 - 150    Aspartate aminotransferase (AST)      0 - 40    Calcium      8 - 10.3    Chloride      98 - 108    Creatinine      0.6 - 1.2    Glucose      75 - 120    Potassium      3.6 - 5.1    Sodium      133 - 145    Total bilirubin      0.2 - 1.6    Total carbon dioxide      18 - 33    Total protein      6.4 - 8.1    Blood Urea Nitrogen      7 - 22    Color          Character          Specific Gravity      <1.035    Ph      5.0 - 8.0    Glucose      Negative mg/dL    Ketones      Negative mg/dL    Protein      Negative mg/dL    Bilirubin      Negative    Urobilinogen, Urine      Negative    Nitrite      Negative    Leukocyte Esterase      Negative    Occult Blood      Negative    Micro Urine Req          Albumin      3.75 - 5.01 g/dL 3.81   Alpha-1 Globulin      0.19 - 0.46 g/dL 0.32   Alpha-2 Globulin      0.48 - 1.05 g/dL 0.61   Beta Globulin      0.48 - 1.10 g/dL 0.88   Gamma Globulin      0.62 - 1.51 g/dL 1.68 (H)   Monoclonal Protein      g/dL Not Applicable   Total Protein, Serum      6.3 - 8.2 g/dL 7.3   Immunofixation       IGGY Done   Interpretation       See  Note   EER Monoclonal Protein Study, Ser       See Note   Cholesterol,Tot      100 - 199 mg/dL 118   Triglycerides      0 - 149 mg/dL 67   HDL      >=40 mg/dL 56   LDL      <100 mg/dL 49   Iron      50 - 180 ug/dL 75   Total Iron Binding      250 - 450 ug/dL 256   Unsat Iron Binding      110 - 370 ug/dL 181   % Saturation      15 - 55 % 29   Glycohemoglobin      0.0 - 5.6 %    Estim. Avg Glu      mg/dL    Vitamin B12 -True Cobalamin      211 - 911 pg/mL    25-Hydroxy   Vitamin D 25      30 - 100 ng/mL    Prostatic Specific Antigen Tot      0.00 - 4.00 ng/mL    TSH      0.380 - 5.330 uIU/mL    CPK Total      0 - 154 U/L 221 (H)   GFR (CKD-EPI)      >60 mL/min/1.73 m 2 71   Ferritin      22.0 - 322.0 ng/mL 251.0     Assessment and Plan:     1. Essential hypertension  irbesartan-hydrochlorothiazide (AVALIDE) 150-12.5 MG per tablet    Comp Metabolic Panel    URINALYSIS    TSH      2. Hypercholesterolemia  Lipid Profile    TSH      3. History of non anemic vitamin B12 deficiency  VITAMIN B12      4. History of vitamin D deficiency  VITAMIN D,25 HYDROXY (DEFICIENCY)      5. Elevated CPK  CREATINE KINASE      6. Anemia, mild normocytic  VITAMIN B12    CBC WITH DIFFERENTIAL    Comp Metabolic Panel    RBC/Hgb/Hct stable over past 4 years      7. Screening for prostate cancer  PROSTATE SPECIFIC AG SCREENING      8. Wellness examination  HEMOGLOBIN A1C    Comp Metabolic Panel    TSH        Discussed he could stop metformin since HbA1c has been normal on past couple checks and he is on a low dose. Advised he cut dose of avalide in half qd since BP is almost too low. Continue other medications at current dosages. Discussed if CPK does increase or he develops symptoms of general muscle weakness or pain would have him stop zetia. Check CBC, CMP, lipid panel, B12, vitamin D, PSA, TSH, urinalysis in 6 months.    Korey Onofre M.D.

## 2023-02-13 DIAGNOSIS — E78.2 MIXED HYPERLIPIDEMIA: ICD-10-CM

## 2023-02-13 RX ORDER — EZETIMIBE 10 MG/1
10 TABLET ORAL DAILY
Qty: 90 TABLET | Refills: 0 | Status: SHIPPED | OUTPATIENT
Start: 2023-02-13 | End: 2023-08-07

## 2023-02-21 DIAGNOSIS — I10 ESSENTIAL HYPERTENSION: ICD-10-CM

## 2023-02-22 RX ORDER — IRBESARTAN AND HYDROCHLOROTHIAZIDE 150; 12.5 MG/1; MG/1
0.5 TABLET, FILM COATED ORAL DAILY
Qty: 45 TABLET | Refills: 0 | Status: SHIPPED | OUTPATIENT
Start: 2023-02-22 | End: 2023-04-06

## 2023-02-28 DIAGNOSIS — R73.03 PREDIABETES: ICD-10-CM

## 2023-02-28 RX ORDER — METFORMIN HYDROCHLORIDE 500 MG/1
500 TABLET, EXTENDED RELEASE ORAL
Qty: 90 TABLET | Refills: 1 | Status: SHIPPED | OUTPATIENT
Start: 2023-02-28 | End: 2023-08-28 | Stop reason: SDUPTHER

## 2023-03-28 RX ORDER — ESCITALOPRAM OXALATE 10 MG/1
10 TABLET ORAL DAILY
Qty: 90 TABLET | Refills: 0 | Status: SHIPPED | OUTPATIENT
Start: 2023-03-28 | End: 2023-06-27

## 2023-04-06 DIAGNOSIS — I10 ESSENTIAL HYPERTENSION: ICD-10-CM

## 2023-04-06 RX ORDER — IRBESARTAN AND HYDROCHLOROTHIAZIDE 150; 12.5 MG/1; MG/1
TABLET, FILM COATED ORAL
Qty: 45 TABLET | Refills: 0 | Status: SHIPPED | OUTPATIENT
Start: 2023-04-06 | End: 2023-04-10 | Stop reason: SDUPTHER

## 2023-04-10 DIAGNOSIS — I10 ESSENTIAL HYPERTENSION: ICD-10-CM

## 2023-04-10 RX ORDER — IRBESARTAN AND HYDROCHLOROTHIAZIDE 150; 12.5 MG/1; MG/1
1 TABLET, FILM COATED ORAL DAILY
Qty: 90 TABLET | Refills: 0 | Status: SHIPPED | OUTPATIENT
Start: 2023-04-10 | End: 2023-07-05

## 2023-04-27 DIAGNOSIS — I10 ESSENTIAL HYPERTENSION: ICD-10-CM

## 2023-04-27 DIAGNOSIS — D64.9 ANEMIA, UNSPECIFIED TYPE: ICD-10-CM

## 2023-04-27 DIAGNOSIS — Z86.39 HISTORY OF NON ANEMIC VITAMIN B12 DEFICIENCY: ICD-10-CM

## 2023-04-27 DIAGNOSIS — Z00.00 WELLNESS EXAMINATION: ICD-10-CM

## 2023-04-27 DIAGNOSIS — E55.9 VITAMIN D DEFICIENCY: ICD-10-CM

## 2023-04-27 DIAGNOSIS — R74.8 ELEVATED CPK: ICD-10-CM

## 2023-04-27 DIAGNOSIS — Z12.5 SCREENING FOR PROSTATE CANCER: ICD-10-CM

## 2023-04-27 DIAGNOSIS — E78.00 HYPERCHOLESTEROLEMIA: ICD-10-CM

## 2023-04-28 ENCOUNTER — HOSPITAL ENCOUNTER (OUTPATIENT)
Facility: MEDICAL CENTER | Age: 73
End: 2023-04-28
Attending: FAMILY MEDICINE
Payer: COMMERCIAL

## 2023-04-28 ENCOUNTER — NON-PROVIDER VISIT (OUTPATIENT)
Dept: INTERNAL MEDICINE | Facility: IMAGING CENTER | Age: 73
End: 2023-04-28
Payer: COMMERCIAL

## 2023-04-28 DIAGNOSIS — Z01.89 ENCOUNTER FOR ROUTINE LABORATORY TESTING: ICD-10-CM

## 2023-04-28 DIAGNOSIS — Z86.39 HISTORY OF NON ANEMIC VITAMIN B12 DEFICIENCY: ICD-10-CM

## 2023-04-28 DIAGNOSIS — Z00.00 WELLNESS EXAMINATION: ICD-10-CM

## 2023-04-28 DIAGNOSIS — Z12.5 SCREENING FOR PROSTATE CANCER: ICD-10-CM

## 2023-04-28 DIAGNOSIS — I10 ESSENTIAL HYPERTENSION: ICD-10-CM

## 2023-04-28 DIAGNOSIS — E78.00 HYPERCHOLESTEROLEMIA: ICD-10-CM

## 2023-04-28 DIAGNOSIS — R74.8 ELEVATED CPK: ICD-10-CM

## 2023-04-28 DIAGNOSIS — D64.9 ANEMIA, UNSPECIFIED TYPE: ICD-10-CM

## 2023-04-28 DIAGNOSIS — E55.9 VITAMIN D DEFICIENCY: ICD-10-CM

## 2023-04-28 LAB
25(OH)D3 SERPL-MCNC: 41 NG/ML (ref 30–100)
ALBUMIN SERPL BCP-MCNC: 4 G/DL (ref 3.2–4.9)
ALBUMIN/GLOB SERPL: 1.2 G/DL
ALP SERPL-CCNC: 67 U/L (ref 30–99)
ALT SERPL-CCNC: 18 U/L (ref 2–50)
ANION GAP SERPL CALC-SCNC: 10 MMOL/L (ref 7–16)
APPEARANCE UR: CLEAR
AST SERPL-CCNC: 31 U/L (ref 12–45)
BASOPHILS # BLD AUTO: 0.6 % (ref 0–1.8)
BASOPHILS # BLD: 0.04 K/UL (ref 0–0.12)
BILIRUB SERPL-MCNC: 0.5 MG/DL (ref 0.1–1.5)
BILIRUB UR QL STRIP.AUTO: NEGATIVE
BUN SERPL-MCNC: 22 MG/DL (ref 8–22)
CALCIUM ALBUM COR SERPL-MCNC: 8.8 MG/DL (ref 8.5–10.5)
CALCIUM SERPL-MCNC: 8.8 MG/DL (ref 8.5–10.5)
CHLORIDE SERPL-SCNC: 107 MMOL/L (ref 96–112)
CHOLEST SERPL-MCNC: 117 MG/DL (ref 100–199)
CK SERPL-CCNC: 201 U/L (ref 0–154)
CO2 SERPL-SCNC: 27 MMOL/L (ref 20–33)
COLOR UR: NORMAL
CREAT SERPL-MCNC: 1.15 MG/DL (ref 0.5–1.4)
EOSINOPHIL # BLD AUTO: 0.57 K/UL (ref 0–0.51)
EOSINOPHIL NFR BLD: 9 % (ref 0–6.9)
ERYTHROCYTE [DISTWIDTH] IN BLOOD BY AUTOMATED COUNT: 43.8 FL (ref 35.9–50)
GFR SERPLBLD CREATININE-BSD FMLA CKD-EPI: 67 ML/MIN/1.73 M 2
GLOBULIN SER CALC-MCNC: 3.3 G/DL (ref 1.9–3.5)
GLUCOSE SERPL-MCNC: 90 MG/DL (ref 65–99)
GLUCOSE UR STRIP.AUTO-MCNC: NEGATIVE MG/DL
HCT VFR BLD AUTO: 40.8 % (ref 42–52)
HDLC SERPL-MCNC: 59 MG/DL
HGB BLD-MCNC: 13.1 G/DL (ref 14–18)
IMM GRANULOCYTES # BLD AUTO: 0.03 K/UL (ref 0–0.11)
IMM GRANULOCYTES NFR BLD AUTO: 0.5 % (ref 0–0.9)
KETONES UR STRIP.AUTO-MCNC: NEGATIVE MG/DL
LDLC SERPL CALC-MCNC: 50 MG/DL
LEUKOCYTE ESTERASE UR QL STRIP.AUTO: NEGATIVE
LYMPHOCYTES # BLD AUTO: 1.4 K/UL (ref 1–4.8)
LYMPHOCYTES NFR BLD: 22.2 % (ref 22–41)
MCH RBC QN AUTO: 29.5 PG (ref 27–33)
MCHC RBC AUTO-ENTMCNC: 32.1 G/DL (ref 33.7–35.3)
MCV RBC AUTO: 91.9 FL (ref 81.4–97.8)
MICRO URNS: NORMAL
MONOCYTES # BLD AUTO: 0.45 K/UL (ref 0–0.85)
MONOCYTES NFR BLD AUTO: 7.1 % (ref 0–13.4)
NEUTROPHILS # BLD AUTO: 3.81 K/UL (ref 1.82–7.42)
NEUTROPHILS NFR BLD: 60.6 % (ref 44–72)
NITRITE UR QL STRIP.AUTO: NEGATIVE
NRBC # BLD AUTO: 0 K/UL
NRBC BLD-RTO: 0 /100 WBC
PH UR STRIP.AUTO: 5 [PH] (ref 5–8)
PLATELET # BLD AUTO: 220 K/UL (ref 164–446)
PMV BLD AUTO: 10.2 FL (ref 9–12.9)
POTASSIUM SERPL-SCNC: 3.8 MMOL/L (ref 3.6–5.5)
PROT SERPL-MCNC: 7.3 G/DL (ref 6–8.2)
PROT UR QL STRIP: NEGATIVE MG/DL
PSA SERPL-MCNC: 1.08 NG/ML (ref 0–4)
RBC # BLD AUTO: 4.44 M/UL (ref 4.7–6.1)
RBC UR QL AUTO: NEGATIVE
SODIUM SERPL-SCNC: 144 MMOL/L (ref 135–145)
SP GR UR STRIP.AUTO: 1.02
TRIGL SERPL-MCNC: 42 MG/DL (ref 0–149)
TSH SERPL DL<=0.005 MIU/L-ACNC: 1.9 UIU/ML (ref 0.38–5.33)
UROBILINOGEN UR STRIP.AUTO-MCNC: 1 MG/DL
VIT B12 SERPL-MCNC: 784 PG/ML (ref 211–911)
WBC # BLD AUTO: 6.3 K/UL (ref 4.8–10.8)

## 2023-04-28 PROCEDURE — 82550 ASSAY OF CK (CPK): CPT

## 2023-04-28 PROCEDURE — 80053 COMPREHEN METABOLIC PANEL: CPT

## 2023-04-28 PROCEDURE — 81003 URINALYSIS AUTO W/O SCOPE: CPT

## 2023-04-28 PROCEDURE — 80061 LIPID PANEL: CPT

## 2023-04-28 PROCEDURE — 85025 COMPLETE CBC W/AUTO DIFF WBC: CPT

## 2023-04-28 PROCEDURE — 84153 ASSAY OF PSA TOTAL: CPT

## 2023-04-28 PROCEDURE — 82607 VITAMIN B-12: CPT

## 2023-04-28 PROCEDURE — 82306 VITAMIN D 25 HYDROXY: CPT

## 2023-04-28 PROCEDURE — 83036 HEMOGLOBIN GLYCOSYLATED A1C: CPT

## 2023-04-28 PROCEDURE — 84443 ASSAY THYROID STIM HORMONE: CPT

## 2023-04-29 LAB
EST. AVERAGE GLUCOSE BLD GHB EST-MCNC: 123 MG/DL
HBA1C MFR BLD: 5.9 % (ref 4–5.6)

## 2023-05-23 ENCOUNTER — OFFICE VISIT (OUTPATIENT)
Dept: INTERNAL MEDICINE | Facility: IMAGING CENTER | Age: 73
End: 2023-05-23
Payer: COMMERCIAL

## 2023-05-23 VITALS
SYSTOLIC BLOOD PRESSURE: 108 MMHG | BODY MASS INDEX: 22.89 KG/M2 | TEMPERATURE: 97.4 F | HEIGHT: 72 IN | WEIGHT: 169 LBS | HEART RATE: 78 BPM | DIASTOLIC BLOOD PRESSURE: 62 MMHG | RESPIRATION RATE: 14 BRPM | OXYGEN SATURATION: 95 %

## 2023-05-23 DIAGNOSIS — Z76.89 ESTABLISHING CARE WITH NEW DOCTOR, ENCOUNTER FOR: ICD-10-CM

## 2023-05-23 DIAGNOSIS — Z00.00 MEDICARE ANNUAL WELLNESS VISIT, SUBSEQUENT: Primary | ICD-10-CM

## 2023-05-23 DIAGNOSIS — I10 PRIMARY HYPERTENSION: ICD-10-CM

## 2023-05-23 DIAGNOSIS — R73.03 PREDIABETES: ICD-10-CM

## 2023-05-23 DIAGNOSIS — E78.2 MIXED HYPERLIPIDEMIA WITH APOLIPOPROTEIN E4 VARIANT: ICD-10-CM

## 2023-05-23 DIAGNOSIS — E53.8 B12 DEFICIENCY: ICD-10-CM

## 2023-05-23 PROCEDURE — 3078F DIAST BP <80 MM HG: CPT | Performed by: FAMILY MEDICINE

## 2023-05-23 PROCEDURE — 99214 OFFICE O/P EST MOD 30 MIN: CPT | Mod: 25 | Performed by: FAMILY MEDICINE

## 2023-05-23 PROCEDURE — G0439 PPPS, SUBSEQ VISIT: HCPCS | Performed by: FAMILY MEDICINE

## 2023-05-23 PROCEDURE — 3074F SYST BP LT 130 MM HG: CPT | Performed by: FAMILY MEDICINE

## 2023-05-23 ASSESSMENT — FIBROSIS 4 INDEX: FIB4 SCORE: 2.42

## 2023-05-23 ASSESSMENT — ENCOUNTER SYMPTOMS: GENERAL WELL-BEING: EXCELLENT

## 2023-05-23 ASSESSMENT — PATIENT HEALTH QUESTIONNAIRE - PHQ9: CLINICAL INTERPRETATION OF PHQ2 SCORE: 0

## 2023-05-23 ASSESSMENT — ACTIVITIES OF DAILY LIVING (ADL): BATHING_REQUIRES_ASSISTANCE: 0

## 2023-06-09 DIAGNOSIS — E78.5 DYSLIPIDEMIA: ICD-10-CM

## 2023-06-09 RX ORDER — SIMVASTATIN 20 MG
20 TABLET ORAL
Qty: 90 TABLET | Refills: 3 | Status: SHIPPED | OUTPATIENT
Start: 2023-06-09

## 2023-06-16 NOTE — PROGRESS NOTES
Chief Complaint   Patient presents with    Establish Care    Medicare Annual Wellness       Subjective:     HPI:   Jose Fallon is a 73 y.o. male here  to  estab care/ discuss the evaluation and management of:       HTN  Hyperlipidemia  Metab. syndrome  CPKs in 200s    No problems updated.   Very active -golf/walks/yard work  Diet- has sweet tooth    Acct/ 4 d. week    Colonooscopy 2021    FH- PGF- CAD   No CP,SOB    Rt retinal issue        Current Outpatient Medications on File Prior to Visit   Medication Sig Dispense Refill    irbesartan-hydrochlorothiazide (AVALIDE) 150-12.5 MG per tablet Take 1 Tablet by mouth every day. 90 Tablet 0    escitalopram (LEXAPRO) 10 MG Tab Take 1 Tablet by mouth every day. 90 Tablet 0    metFORMIN ER (GLUCOPHAGE XR) 500 MG TABLET SR 24 HR Take 1 Tablet by mouth every day. 90 Tablet 1    ezetimibe (ZETIA) 10 MG Tab Take 1 Tablet by mouth every day. 90 Tablet 0     No current facility-administered medications on file prior to visit.       Objective:     /62   Pulse 78   Temp 36.3 °C (97.4 °F)   Resp 14   Ht 1.829 m (6')   Wt 76.7 kg (169 lb)   SpO2 95%  Body mass index is 22.92 kg/m².    Physical Exam:  Physical Exam  Constitutional: Well-developed and well-nourished. Not diaphoretic. No distress.   Skin: Skin is warm and dry. No rash noted.  Head: Atraumatic without lesions.  Eyes: Conjunctivae and extraocular motions are normal.   Ears:  External ears unremarkable.    Nose: Nares patent. Mucosa without edema or erythema. No discharge. No facial tenderness.     Neck: Supple, No thyromegaly present. No JVD  Cardiovascular: Regular rate and rhythm.   Chest: Effort normal. Clear to auscultation throughout. No adventitious sounds.   Abdomen:  without distention.  .  Extremities: No cyanosis, clubbing, erythema, nor edema.   Neurological: Alert and oriented x 3.    Psychiatric:  Behavior, mood, and affect are appropriate            Hospital Outpatient Visit on  04/28/2023   Component Date Value Ref Range Status    Vitamin B12 -True Cobalamin 04/28/2023 784  211 - 911 pg/mL Final    WBC 04/28/2023 6.3  4.8 - 10.8 K/uL Final    RBC 04/28/2023 4.44 (L)  4.70 - 6.10 M/uL Final    Hemoglobin 04/28/2023 13.1 (L)  14.0 - 18.0 g/dL Final    Hematocrit 04/28/2023 40.8 (L)  42.0 - 52.0 % Final    MCV 04/28/2023 91.9  81.4 - 97.8 fL Final    MCH 04/28/2023 29.5  27.0 - 33.0 pg Final    MCHC 04/28/2023 32.1 (L)  33.7 - 35.3 g/dL Final    RDW 04/28/2023 43.8  35.9 - 50.0 fL Final    Platelet Count 04/28/2023 220  164 - 446 K/uL Final    MPV 04/28/2023 10.2  9.0 - 12.9 fL Final    Neutrophils-Polys 04/28/2023 60.60  44.00 - 72.00 % Final    Lymphocytes 04/28/2023 22.20  22.00 - 41.00 % Final    Monocytes 04/28/2023 7.10  0.00 - 13.40 % Final    Eosinophils 04/28/2023 9.00 (H)  0.00 - 6.90 % Final    Basophils 04/28/2023 0.60  0.00 - 1.80 % Final    Immature Granulocytes 04/28/2023 0.50  0.00 - 0.90 % Final    Nucleated RBC 04/28/2023 0.00  /100 WBC Final    Neutrophils (Absolute) 04/28/2023 3.81  1.82 - 7.42 K/uL Final    Includes immature neutrophils, if present.    Lymphs (Absolute) 04/28/2023 1.40  1.00 - 4.80 K/uL Final    Monos (Absolute) 04/28/2023 0.45  0.00 - 0.85 K/uL Final    Eos (Absolute) 04/28/2023 0.57 (H)  0.00 - 0.51 K/uL Final    Baso (Absolute) 04/28/2023 0.04  0.00 - 0.12 K/uL Final    Immature Granulocytes (abs) 04/28/2023 0.03  0.00 - 0.11 K/uL Final    NRBC (Absolute) 04/28/2023 0.00  K/uL Final    Glycohemoglobin 04/28/2023 5.9 (H)  4.0 - 5.6 % Final    Comment: Increased risk for diabetes:  5.7 -6.4%  Diabetes:  >6.4%  Glycemic control for adults with diabetes:  <7.0%    The above interpretations are per ADA guidelines.  Diagnosis  of diabetes mellitus on the basis of elevated Hemoglobin A1c  should be confirmed by repeating the Hb A1c test.      Est Avg Glucose 04/28/2023 123  mg/dL Final    Comment: The eAG calculation is based on the A1c-Derived Daily  Glucose  (ADAG) study.  See the ADA's website for additional information.      Sodium 04/28/2023 144  135 - 145 mmol/L Final    Potassium 04/28/2023 3.8  3.6 - 5.5 mmol/L Final    Chloride 04/28/2023 107  96 - 112 mmol/L Final    Co2 04/28/2023 27  20 - 33 mmol/L Final    Anion Gap 04/28/2023 10.0  7.0 - 16.0 Final    Glucose 04/28/2023 90  65 - 99 mg/dL Final    Bun 04/28/2023 22  8 - 22 mg/dL Final    Creatinine 04/28/2023 1.15  0.50 - 1.40 mg/dL Final    Calcium 04/28/2023 8.8  8.5 - 10.5 mg/dL Final    AST(SGOT) 04/28/2023 31  12 - 45 U/L Final    ALT(SGPT) 04/28/2023 18  2 - 50 U/L Final    Alkaline Phosphatase 04/28/2023 67  30 - 99 U/L Final    Total Bilirubin 04/28/2023 0.5  0.1 - 1.5 mg/dL Final    Albumin 04/28/2023 4.0  3.2 - 4.9 g/dL Final    Total Protein 04/28/2023 7.3  6.0 - 8.2 g/dL Final    Globulin 04/28/2023 3.3  1.9 - 3.5 g/dL Final    A-G Ratio 04/28/2023 1.2  g/dL Final    25-Hydroxy   Vitamin D 25 04/28/2023 41  30 - 100 ng/mL Final    Comment: Adult Ranges:   <20 ng/mL - Deficiency  20-29 ng/mL - Insufficiency   ng/mL - Sufficiency  Electrochemiluminescence binding assay performed using Roche stefania e  immunoassay analyzer.  The Elecsys Vitamin D total II assay is intended for  the quantitative determination of total 25 hydroxyvitamin D in human serum  and plasma. This assay is to be used as an aid in the assessment of vitamin  D sufficiency in adults.      Prostatic Specific Antigen Tot 04/28/2023 1.08  0.00 - 4.00 ng/mL Final    Comment: Performed using Roche stefania e immunoassay analyzer. tPSA values determined  on patient samples by different testing procedures cannot be directly  compared with one another and could be the cause of erroneous medical  interpretations. If there is a change in the tPSA assay procedure used while  monitoring therapy, then new baselines may need to be established when  comparing previous results.      Cholesterol,Tot 04/28/2023 117  100 - 199 mg/dL Final     Triglycerides 04/28/2023 42  0 - 149 mg/dL Final    HDL 04/28/2023 59  >=40 mg/dL Final    LDL 04/28/2023 50  <100 mg/dL Final    TSH 04/28/2023 1.900  0.380 - 5.330 uIU/mL Final    Comment: The 2011 American Thyroid Association (MARY BETH) guidelines  recommended that the interpretation of thyroid function in  pregnancy be based on trimester specific reference ranges.    1st Trimester  0.100-2.500 mIU/L  2nd Trimester  0.200-3.000 mIU/L  3rd Trimester  0.300-3.500 mIU/L    These established reference ranges have not been validated  at [facility].      CPK Total 04/28/2023 201 (H)  0 - 154 U/L Final    Color 04/28/2023 DK Yellow   Final    Character 04/28/2023 Clear   Final    Specific Gravity 04/28/2023 1.025  <1.035 Final    Ph 04/28/2023 5.0  5.0 - 8.0 Final    Glucose 04/28/2023 Negative  Negative mg/dL Final    Ketones 04/28/2023 Negative  Negative mg/dL Final    Protein 04/28/2023 Negative  Negative mg/dL Final    Bilirubin 04/28/2023 Negative  Negative Final    Urobilinogen, Urine 04/28/2023 1.0  Negative Final    Nitrite 04/28/2023 Negative  Negative Final    Leukocyte Esterase 04/28/2023 Negative  Negative Final    Occult Blood 04/28/2023 Negative  Negative Final    Micro Urine Req 04/28/2023 see below   Final    Comment: Microscopic examination not performed when specimen is clear  and chemically negative for protein, blood, leukocyte esterase  and nitrite.      Correct Calcium 04/28/2023 8.8  8.5 - 10.5 mg/dL Final    GFR (CKD-EPI) 04/28/2023 67  >60 mL/min/1.73 m 2 Final    Comment: Estimated Glomerular Filtration Rate is calculated using  race neutral CKD-EPI 2021 equation per NKF-ASN recommendations.        Assessment and Plan:     The following treatment plan was discussed/researched:  No problem-specific Assessment & Plan notes found for this encounter.    1. Prediabetes  Stable  On metf.      2. Mixed hyperlipidemia with apolipoprotein E4 variant  On Zetia  H/o elev CPK      3. Primary  hypertension  -stable continue current regimen        4. B12 deficiency  stable      5. Establishing care with new doctor, encounter for                                                                                                                                                                                                  Any change or worsening of signs or symptoms, patient encouraged to follow-up or report to emergency room for further evaluation. Patient verbalizes understanding and agrees.    Follow-Up: No follow-ups on file.      PLEASE NOTE: This dictation was created using voice recognition software. I have made every reasonable attempt to correct obvious errors, but I expect that there are errors of grammar and possibly content that I did not discover before finalizing the note.      My total time spent caring for the patient on the day of the encounter was  greater than 40 minutes.   This includes obtaining history, reviewing chart, physical exam, patient education, reviewing outside records, placing orders, interpreting tests and coordinating care.           Chief Complaint   Patient presents with    Establish Care    Medicare Annual Wellness       HPI:  Jose Fallon is a 73 y.o. here for Medicare Annual Wellness Visit     Patient Active Problem List    Diagnosis Date Noted    Arthritis of left knee 09/15/2022    History of colon polyps 11/06/2019    Mixed hyperlipidemia with apolipoprotein E4 variant 02/13/2019    Carotid atherosclerosis, bilateral 02/13/2019    Metabolic syndrome 02/13/2019    Highly echogenic liver on ultrasound 02/13/2019    Nocturnal hypoxia 02/13/2019    hsCRP elevated 02/13/2019    Echocardiogram findings suggestive of PFO (patent foramen ovale) 02/17/2018    Aortic valve sclerosis without stenosis 02/17/2018    Bilateral renal cysts 02/08/2018    Positive genetic test for apolipoprotein E4 genotype 02/08/2018    B12 deficiency 02/08/2018    Agatston CAC score 200-399  02/08/2018    Abnormal resting ECG findings 02/08/2018    Diverticulosis of colon, per 2/5/16 Colonoscopy 02/07/2018    Internal and external hemorrhoids without complication, per 2/5/16 Colonoscopy 02/07/2018    Hypertension 01/29/2018    Hyperlipidemia 01/29/2018    Anxiety 01/29/2018    Prediabetes 01/29/2018    Vitamin D deficiency 01/29/2018    Family history of first degree relative with dementia 01/29/2018    Family history of colon cancer in father 01/29/2018    Family history of hypertension 01/29/2018    Family history of breast cancer in sister 01/29/2018    Hiatal hernia 01/29/2018    History of vertigo 01/29/2018    History of nephritis 01/29/2018    Osteoarthritis 01/29/2018    Hydrocele in adult, left 01/12/2018       Current Outpatient Medications   Medication Sig Dispense Refill    irbesartan-hydrochlorothiazide (AVALIDE) 150-12.5 MG per tablet Take 1 Tablet by mouth every day. 90 Tablet 0    escitalopram (LEXAPRO) 10 MG Tab Take 1 Tablet by mouth every day. 90 Tablet 0    metFORMIN ER (GLUCOPHAGE XR) 500 MG TABLET SR 24 HR Take 1 Tablet by mouth every day. 90 Tablet 1    ezetimibe (ZETIA) 10 MG Tab Take 1 Tablet by mouth every day. 90 Tablet 0    simvastatin (ZOCOR) 20 MG Tab Take 1 Tablet by mouth every day. 90 Tablet 3     No current facility-administered medications for this visit.          Current supplements as per medication list.     Allergies: Patient has no known allergies.    Current social contact/activities: friends/family     He  reports that he quit smoking about 63 years ago. His smoking use included cigarettes. He started smoking about 65 years ago. He has a 2.00 pack-year smoking history. He has never used smokeless tobacco. He reports current alcohol use of about 1.2 oz of alcohol per week. He reports that he does not use drugs.  Counseling given: Not Answered      ROS:          Gait: Uses no assistive device  Ostomy: No  Other tubes: No  Amputations: No  Chronic oxygen use:  No  Last eye exam: Up-to-date  Wears hearing aids: No   : Denies any unmanageable urinary leakage during the last 6 months       Screening:     Depression Screening  Little interest or pleasure in doing things?  0 - not at all  Feeling down, depressed , or hopeless? 0 - not at all  Patient Health Questionnaire Score: 0     If depressive symptoms identified deferred to follow up visit unless specifically addressed in assessment and plan.    Interpretation of PHQ-9 Total Score   Score Severity   1-4 No Depression   5-9 Mild Depression   10-14 Moderate Depression   15-19 Moderately Severe Depression   20-27 Severe Depression    Screening for Cognitive Impairment  Three Minute Recall (daughter, heaven, mountain) 3/3    Madhu clock face with all 12 numbers and set the hands to show 10 past 11.  Yes    Cognitive concerns identified deferred for follow up unless specifically addressed in assessment and plan.    Fall Risk Assessment  Has the patient had two or more falls in the last year or any fall with injury in the last year?  No    Safety Assessment  Throw rugs on floor.  Yes  Handrails on all stairs.  Yes  Good lighting in all hallways.  Yes  Difficulty hearing.  No  Patient counseled about all safety risks that were identified.    Functional Assessment ADLs  Are there any barriers preventing you from cooking for yourself or meeting nutritional needs?  No.    Are there any barriers preventing you from driving safely or obtaining transportation?  No.    Are there any barriers preventing you from using a telephone or calling for help?  No.    Are there any barriers preventing you from shopping?  No.    Are there any barriers preventing you from taking care of your own finances?  No.    Are there any barriers preventing you from managing your medications?  No.    Are there any barriers preventing you from showering, bathing or dressing yourself?  No.    Are you currently engaging in any exercise or physical activity?   Yes.     What is your perception of your health?  Excellent    Advance Care Planning  Do you have an Advance Directive, Living Will, Durable Power of , or POLST? Yes      Durable Power of    is not on file - instructed patient to bring in a copy to scan into their chart      Health Maintenance Summary            Overdue - COVID-19 Vaccine (5 - Pfizer series) Overdue since 2/14/2023      10/14/2022  Imm Admin: PFIZER BIVALENT BOOSTER SARS-COV-2 VACCINE (12+)    10/16/2021  Imm Admin: PFIZER PURPLE CAP SARS-COV-2 VACCINATION (12+)    03/16/2021  Imm Admin: PFIZER PURPLE CAP SARS-COV-2 VACCINATION (12+)    02/23/2021  Imm Admin: PFIZER PURPLE CAP SARS-COV-2 VACCINATION (12+)              COLORECTAL CANCER SCREENING (COLONOSCOPY - Every 5 Years) Tentatively due on 7/23/2026 07/23/2021  Referral to GI for Colonoscopy    02/05/2016  REFERRAL TO GI FOR COLONOSCOPY              IMM DTaP/Tdap/Td Vaccine (2 - Td or Tdap) Next due on 1/18/2028 01/18/2018  Imm Admin: Tdap Vaccine              ABDOMINAL AORTIC ANEURYSM (AAA) SCREEN  Completed      01/14/2019  US-ABDOMEN COMPLETE SURVEY    01/18/2018  US-ABDOMEN COMPLETE SURVEY              HEPATITIS C SCREENING  Completed      01/14/2019  HEP C VIRUS ANTIBODY              IMM PNEUMOCOCCAL VACCINE: 65+ Years (Series Information) Completed      02/07/2019  Imm Admin: Pneumococcal Conjugate Vaccine (Prevnar/PCV-13)    01/18/2018  Imm Admin: Pneumococcal polysaccharide vaccine (PPSV-23)              IMM ZOSTER VACCINES (Series Information) Completed      04/29/2022  Imm Admin: Zoster Vaccine Recombinant (RZV) (SHINGRIX)    04/09/2021  Imm Admin: Zoster Vaccine Recombinant (RZV) (SHINGRIX)    01/09/2017  Imm Admin: Zoster Vaccine Live (ZVL) (Zostavax) - HISTORICAL DATA              IMM INFLUENZA (Series Information) Completed      10/21/2022  Imm Admin: Influenza Vaccine Adult HD    10/08/2021  Imm Admin: Influenza Vaccine Adult HD    09/28/2020  Imm Admin:  Influenza Vaccine Adult HD    2019  Imm Admin: Influenza Vaccine Adult HD    10/09/2018  Imm Admin: Influenza Vaccine Adult HD    Only the first 5 history entries have been loaded, but more history exists.              HPV Vaccines (Series Information) Aged Out      No completion history exists for this topic.              IMM MENINGOCOCCAL ACWY VACCINE (Series Information) Aged Out      No completion history exists for this topic.              Discontinued - IMM HEP B VACCINE  Discontinued      No completion history exists for this topic.                    Patient Care Team:  Dionne Spears M.D. as PCP - General (Family Medicine)  Heike Azul M.D. as Consulting Physician (Cardiovascular Disease (Cardiology))  Don Manjarrez M.D. as Consulting Physician (Nephrology)  Blade Feliciano M.D. as Consulting Physician (Urology)  Bonnie Perry R.N.  Cheyenne Duncan, R.N.        Social History     Tobacco Use    Smoking status: Former     Packs/day: 1.00     Years: 2.00     Pack years: 2.00     Types: Cigarettes     Start date: 1957     Quit date: 1959     Years since quittin.5    Smokeless tobacco: Never   Vaping Use    Vaping Use: Never used   Substance Use Topics    Alcohol use: Yes     Alcohol/week: 1.2 oz     Types: 2 Cans of beer per week    Drug use: No     Family History   Problem Relation Age of Onset    Dementia Mother 70         @ 81 years    Hypertension Father 78         @ 79 years    Cancer Father 77        Colon Cancer    Kidney Disease Father 78        Kidney failure was cause of death    Breast Cancer Sister 50    Heart Attack Paternal Grandmother 78        Fatal MI    Heart Attack Paternal Grandfather 60        Fatal MI    No Known Problems Son     No Known Problems Son      He  has a past medical history of Agatston CAC score 200-399 (2018), Anxiety, B12 deficiency (2018), Carotid atherosclerosis, bilateral (2019), Headache, History of  colon polyps (11/6/2019), Hyperlipidemia, Hypertension, and Vertigo (2015).   Past Surgical History:   Procedure Laterality Date    PB TOTAL KNEE ARTHROPLASTY Left 12/07/2022    Procedure: LEFT TOTAL KNEE ARTHROPLASTY;  Surgeon: Timur Villafuerte M.D.;  Location: [facility] [place];  Service: Orthopedics    CATARACT EXTRACTION WITH IOL  05/13/2021    HERNIA REPAIR  2006       Exam:   /62   Pulse 78   Temp 36.3 °C (97.4 °F)   Resp 14   Ht 1.829 m (6')   Wt 76.7 kg (169 lb)   SpO2 95%  Body mass index is 22.92 kg/m².         Physical Exam  Constitutional:       General: he is not in acute distress.     Appearance: Normal appearance.   HENT:      Head: Normocephalic and atraumatic.      Nose: Nose normal.      Mouth/Throat:      Mouth: Mucous membranes are moist.   Eyes:      Conjunctiva/sclera: Conjunctivae normal.   Cardiovascular:      Rate and Rhythm: Normal rate and regular rhythm.   Pulmonary:      Effort: Pulmonary effort is normal.      Breath sounds: Normal breath sounds. No wheezing.   Abdominal:      General: Abdomen is flat.      Palpations: Abdomen is soft.   Musculoskeletal:      Cervical back: No rigidity.      Right lower leg: No edema.      Left lower leg: No edema.   Lymphadenopathy:      Cervical: No cervical adenopathy.   Skin:     Coloration: Skin is not jaundiced.      Findings: No erythema.   Neurological:      General: No focal deficit present.      Mental Status: he is alert and oriented to person, place, and time. Mental status is at baseline.   Psychiatric:         Mood and Affect: Mood normal.         Behavior: Behavior normal.         Thought Content: Thought content normal.         Judgment: Judgment normal.         Assessment and Plan. The following treatment and monitoring plan is recommended:           Medicare annual wellness visit, subsequent    Labs reviewed  See Prob note           Services suggested: No services needed at this time  Health Care Screening:  Age-appropriate preventive services recommended by USPTF and ACIP covered by Medicare were discussed today. Services ordered if indicated and agreed upon by the patient.  Referrals offered: Community-based lifestyle interventions to reduce health risks and promote self-management and wellness, fall prevention, nutrition, physical activity, tobacco-use cessation, weight loss, and mental health services as per orders if indicated.    Discussion today about general wellness and lifestyle habits:    Prevent falls and reduce trip hazards; Cautioned about securing or removing rugs.  Have a working fire alarm and carbon monoxide detector;   Engage in regular physical activity and social activities     Follow-up: Annually for annual wellness exam and as needed

## 2023-06-27 RX ORDER — ESCITALOPRAM OXALATE 10 MG/1
TABLET ORAL
Qty: 90 TABLET | Refills: 3 | Status: SHIPPED | OUTPATIENT
Start: 2023-06-27

## 2023-07-05 DIAGNOSIS — I10 ESSENTIAL HYPERTENSION: ICD-10-CM

## 2023-07-05 RX ORDER — IRBESARTAN AND HYDROCHLOROTHIAZIDE 150; 12.5 MG/1; MG/1
TABLET, FILM COATED ORAL
Qty: 90 TABLET | Refills: 3 | Status: SHIPPED | OUTPATIENT
Start: 2023-07-05

## 2023-08-07 DIAGNOSIS — E78.2 MIXED HYPERLIPIDEMIA: ICD-10-CM

## 2023-08-07 RX ORDER — EZETIMIBE 10 MG/1
10 TABLET ORAL
Qty: 90 TABLET | Refills: 1 | Status: SHIPPED | OUTPATIENT
Start: 2023-08-07

## 2023-08-28 DIAGNOSIS — R73.03 PREDIABETES: ICD-10-CM

## 2023-08-28 RX ORDER — METFORMIN HYDROCHLORIDE 500 MG/1
500 TABLET, EXTENDED RELEASE ORAL
Qty: 90 TABLET | Refills: 1 | Status: SHIPPED | OUTPATIENT
Start: 2023-08-28 | End: 2024-03-04

## 2023-09-26 ENCOUNTER — OFFICE VISIT (OUTPATIENT)
Dept: CARDIOLOGY | Facility: MEDICAL CENTER | Age: 73
End: 2023-09-26
Attending: NURSE PRACTITIONER
Payer: COMMERCIAL

## 2023-09-26 VITALS
HEART RATE: 67 BPM | RESPIRATION RATE: 16 BRPM | HEIGHT: 72 IN | SYSTOLIC BLOOD PRESSURE: 102 MMHG | WEIGHT: 169 LBS | DIASTOLIC BLOOD PRESSURE: 60 MMHG | BODY MASS INDEX: 22.89 KG/M2 | OXYGEN SATURATION: 96 %

## 2023-09-26 DIAGNOSIS — R73.03 PREDIABETES: ICD-10-CM

## 2023-09-26 DIAGNOSIS — I65.23 CAROTID ATHEROSCLEROSIS, BILATERAL: ICD-10-CM

## 2023-09-26 DIAGNOSIS — Q21.12 PFO (PATENT FORAMEN OVALE): ICD-10-CM

## 2023-09-26 DIAGNOSIS — I35.8 AORTIC VALVE SCLEROSIS: ICD-10-CM

## 2023-09-26 DIAGNOSIS — R93.1 AGATSTON CAC SCORE 200-399: ICD-10-CM

## 2023-09-26 DIAGNOSIS — I10 PRIMARY HYPERTENSION: ICD-10-CM

## 2023-09-26 DIAGNOSIS — E78.2 MIXED HYPERLIPIDEMIA: ICD-10-CM

## 2023-09-26 PROBLEM — R94.31 ABNORMAL RESTING ECG FINDINGS: Status: RESOLVED | Noted: 2018-02-08 | Resolved: 2023-09-26

## 2023-09-26 PROBLEM — Z80.3 FAMILY HISTORY OF BREAST CANCER IN SISTER: Status: RESOLVED | Noted: 2018-01-29 | Resolved: 2023-09-26

## 2023-09-26 PROBLEM — Z82.49 FAMILY HISTORY OF HYPERTENSION: Status: RESOLVED | Noted: 2018-01-29 | Resolved: 2023-09-26

## 2023-09-26 PROBLEM — Z80.0 FAMILY HISTORY OF COLON CANCER IN FATHER: Status: RESOLVED | Noted: 2018-01-29 | Resolved: 2023-09-26

## 2023-09-26 PROBLEM — Z81.8 FAMILY HISTORY OF FIRST DEGREE RELATIVE WITH DEMENTIA: Status: RESOLVED | Noted: 2018-01-29 | Resolved: 2023-09-26

## 2023-09-26 PROCEDURE — 99211 OFF/OP EST MAY X REQ PHY/QHP: CPT | Performed by: NURSE PRACTITIONER

## 2023-09-26 PROCEDURE — 3078F DIAST BP <80 MM HG: CPT | Performed by: NURSE PRACTITIONER

## 2023-09-26 PROCEDURE — 99214 OFFICE O/P EST MOD 30 MIN: CPT | Performed by: NURSE PRACTITIONER

## 2023-09-26 PROCEDURE — 3074F SYST BP LT 130 MM HG: CPT | Performed by: NURSE PRACTITIONER

## 2023-09-26 ASSESSMENT — ENCOUNTER SYMPTOMS
ORTHOPNEA: 0
FEVER: 0
PND: 0
MYALGIAS: 0
CLAUDICATION: 0
ABDOMINAL PAIN: 0
SHORTNESS OF BREATH: 0
PALPITATIONS: 0
DIZZINESS: 0
COUGH: 0

## 2023-09-26 ASSESSMENT — FIBROSIS 4 INDEX: FIB4 SCORE: 2.46

## 2023-09-26 NOTE — PROGRESS NOTES
"Chief Complaint   Patient presents with    Hypertension     F/V DX: Essential hypertension    Other     F/V DX: Coronary artery calcification     Subjective     Mani Fallon is a 73 y.o. male who presents today for annual follow up for CAD (+CT scoring in ).    Hx of HLD with CAD (+ CT scoring in ) with normal stress imaging in , HTN, mild carotid disease, aortic sclerosis without stenosis, and small PFO (no prior CVA).    He presents today alone. He is overall doing well. His PCP manages his prescriptions.    He got his annual labs done with no concerns.    His knee replacement went well, he walks daily with no symptoms to report.    He has no chest pain, shortness of breath, edema, dizziness/lightheadedness, or palpitations.    Past Medical History:   Diagnosis Date    Agatston CAC score 200-399 2018    CAC 2018: LMA 0, LCX 63.5, .1, RCA 13.4, PDA 0 (total 377.7)    Anxiety     on Lexapro since     B12 deficiency 2018    Carotid atherosclerosis, bilateral 2019    Headache     \"life long\" per pt    History of colon polyps 2019    Hyperlipidemia     Hypertension     Vertigo      Past Surgical History:   Procedure Laterality Date    PB TOTAL KNEE ARTHROPLASTY Left 2022    Procedure: LEFT TOTAL KNEE ARTHROPLASTY;  Surgeon: Timur Villafuerte M.D.;  Location: [facility] [place];  Service: Orthopedics    CATARACT EXTRACTION WITH IOL  2021    HERNIA REPAIR  2006     Family History   Problem Relation Age of Onset    Dementia Mother 70         @ 81 years    Hypertension Father 78         @ 79 years    Cancer Father 77        Colon Cancer    Kidney Disease Father 78        Kidney failure was cause of death    Breast Cancer Sister 50    Heart Attack Paternal Grandmother 78        Fatal MI    Heart Attack Paternal Grandfather 60        Fatal MI    No Known Problems Son     No Known Problems Son      Social History     Socioeconomic History    " Marital status:      Spouse name: Not on file    Number of children: Not on file    Years of education: Not on file    Highest education level: Not on file   Occupational History    Occupation:      Comment: [employer]   Tobacco Use    Smoking status: Former     Current packs/day: 0.00     Average packs/day: 1 pack/day for 2.0 years (2.0 ttl pk-yrs)     Types: Cigarettes     Start date: 1957     Quit date: 1959     Years since quittin.8    Smokeless tobacco: Never   Vaping Use    Vaping Use: Never used   Substance and Sexual Activity    Alcohol use: Yes     Alcohol/week: 1.2 oz     Types: 2 Cans of beer per week    Drug use: No    Sexual activity: Not Currently   Other Topics Concern    Not on file   Social History Narrative    Not on file     Social Determinants of Health     Financial Resource Strain: Not on file   Food Insecurity: Not on file   Transportation Needs: Not on file   Physical Activity: Not on file   Stress: Not on file   Social Connections: Not on file   Intimate Partner Violence: Not on file   Housing Stability: Not on file     No Known Allergies  Outpatient Encounter Medications as of 2023   Medication Sig Dispense Refill    metFORMIN ER (GLUCOPHAGE XR) 500 MG TABLET SR 24 HR Take 1 Tablet by mouth every day. 90 Tablet 1    ezetimibe (ZETIA) 10 MG Tab TAKE ONE TABLET BY MOUTH EVERY DAY 90 Tablet 1    irbesartan-hydrochlorothiazide (AVALIDE) 150-12.5 MG per tablet TAKE ONE TABLET BY MOUTH EVERY DAY 90 Tablet 3    escitalopram (LEXAPRO) 10 MG Tab TAKE ONE TABLET BY MOUTH EVERY DAY 90 Tablet 3    simvastatin (ZOCOR) 20 MG Tab Take 1 Tablet by mouth every day. 90 Tablet 3     No facility-administered encounter medications on file as of 2023.     Review of Systems   Constitutional:  Negative for fever and malaise/fatigue.   Respiratory:  Negative for cough and shortness of breath.    Cardiovascular:  Negative for chest pain, palpitations, orthopnea,  claudication, leg swelling and PND.   Gastrointestinal:  Negative for abdominal pain.   Musculoskeletal:  Negative for myalgias.   Neurological:  Negative for dizziness.              Objective     /60 (BP Location: Left arm, Patient Position: Sitting, BP Cuff Size: Adult)   Pulse 67   Resp 16   Ht 1.829 m (6')   Wt 76.7 kg (169 lb)   SpO2 96%   BMI 22.92 kg/m²     Physical Exam  Vitals and nursing note reviewed.   Constitutional:       Appearance: Normal appearance. He is well-developed and normal weight.   HENT:      Head: Normocephalic and atraumatic.   Neck:      Vascular: No JVD.   Cardiovascular:      Rate and Rhythm: Normal rate and regular rhythm.      Pulses: Normal pulses.      Heart sounds: Normal heart sounds.   Pulmonary:      Effort: Pulmonary effort is normal.      Breath sounds: Normal breath sounds.   Musculoskeletal:         General: Normal range of motion.   Skin:     General: Skin is warm and dry.      Capillary Refill: Capillary refill takes less than 2 seconds.   Neurological:      General: No focal deficit present.      Mental Status: He is alert and oriented to person, place, and time. Mental status is at baseline.   Psychiatric:         Mood and Affect: Mood normal.         Behavior: Behavior normal.         Thought Content: Thought content normal.         Judgment: Judgment normal.                Assessment & Plan     1. Agatston CAC score 200-399        2. Aortic valve sclerosis without stenosis        3. Carotid atherosclerosis, bilateral        4. Echocardiogram findings suggestive of PFO (patent foramen ovale)        5. Mixed hyperlipidemia        6. Primary hypertension        7. Prediabetes          Medical Decision Making: Today's Assessment/Status/Plan:      1. HLD with CAD (+CT scoring)  -no angina or CHEN  -stress imaging in '19 with no ischemia  -cont statin, zetia  -LDL goal <70  -no asa at this time, consider at next apt  -repeat CT scoring in '28    2. HTN  -good  control  -cont avalide 150-12.5 mg QD  -BP goal <130/80    3. Mild carotid disease  -follow clinically  -keep LDL controlled with LDL goal <70    4. Pre-diabetes  -cont metformin  -follow with PCP    Patient is to follow up with Marie CADE in 1 year with annual labs.

## 2023-11-08 ENCOUNTER — TELEPHONE (OUTPATIENT)
Dept: INTERNAL MEDICINE | Facility: IMAGING CENTER | Age: 73
End: 2023-11-08
Payer: COMMERCIAL

## 2023-11-08 DIAGNOSIS — Z79.899 MEDICATION MANAGEMENT: ICD-10-CM

## 2023-11-09 NOTE — TELEPHONE ENCOUNTER
----- Message from Bonnie Perry R.N. sent at 11/8/2023  2:03 PM PST -----  Please add 6 month f/u labs.    Thanks!

## 2023-11-10 ENCOUNTER — NON-PROVIDER VISIT (OUTPATIENT)
Dept: INTERNAL MEDICINE | Facility: IMAGING CENTER | Age: 73
End: 2023-11-10
Payer: COMMERCIAL

## 2023-11-10 ENCOUNTER — HOSPITAL ENCOUNTER (OUTPATIENT)
Facility: MEDICAL CENTER | Age: 73
End: 2023-11-10
Attending: FAMILY MEDICINE
Payer: COMMERCIAL

## 2023-11-10 DIAGNOSIS — Z79.899 MEDICATION MANAGEMENT: ICD-10-CM

## 2023-11-10 LAB
25(OH)D3 SERPL-MCNC: 45 NG/ML (ref 30–100)
ALBUMIN SERPL BCP-MCNC: 4.1 G/DL (ref 3.2–4.9)
ALBUMIN/GLOB SERPL: 1.2 G/DL
ALP SERPL-CCNC: 55 U/L (ref 30–99)
ALT SERPL-CCNC: 23 U/L (ref 2–50)
ANION GAP SERPL CALC-SCNC: 8 MMOL/L (ref 7–16)
AST SERPL-CCNC: 33 U/L (ref 12–45)
BILIRUB SERPL-MCNC: 0.4 MG/DL (ref 0.1–1.5)
BUN SERPL-MCNC: 25 MG/DL (ref 8–22)
CALCIUM ALBUM COR SERPL-MCNC: 8.9 MG/DL (ref 8.5–10.5)
CALCIUM SERPL-MCNC: 9 MG/DL (ref 8.5–10.5)
CHLORIDE SERPL-SCNC: 105 MMOL/L (ref 96–112)
CHOLEST SERPL-MCNC: 122 MG/DL (ref 100–199)
CO2 SERPL-SCNC: 28 MMOL/L (ref 20–33)
CREAT SERPL-MCNC: 1.1 MG/DL (ref 0.5–1.4)
ERYTHROCYTE [DISTWIDTH] IN BLOOD BY AUTOMATED COUNT: 42.4 FL (ref 35.9–50)
EST. AVERAGE GLUCOSE BLD GHB EST-MCNC: 111 MG/DL
GFR SERPLBLD CREATININE-BSD FMLA CKD-EPI: 70 ML/MIN/1.73 M 2
GLOBULIN SER CALC-MCNC: 3.3 G/DL (ref 1.9–3.5)
GLUCOSE SERPL-MCNC: 92 MG/DL (ref 65–99)
HBA1C MFR BLD: 5.5 % (ref 4–5.6)
HCT VFR BLD AUTO: 42 % (ref 42–52)
HDLC SERPL-MCNC: 62 MG/DL
HGB BLD-MCNC: 13.7 G/DL (ref 14–18)
LDLC SERPL CALC-MCNC: 50 MG/DL
MCH RBC QN AUTO: 30.4 PG (ref 27–33)
MCHC RBC AUTO-ENTMCNC: 32.6 G/DL (ref 32.3–36.5)
MCV RBC AUTO: 93.3 FL (ref 81.4–97.8)
PLATELET # BLD AUTO: 221 K/UL (ref 164–446)
PMV BLD AUTO: 9.8 FL (ref 9–12.9)
POTASSIUM SERPL-SCNC: 3.9 MMOL/L (ref 3.6–5.5)
PROT SERPL-MCNC: 7.4 G/DL (ref 6–8.2)
RBC # BLD AUTO: 4.5 M/UL (ref 4.7–6.1)
SODIUM SERPL-SCNC: 141 MMOL/L (ref 135–145)
TRIGL SERPL-MCNC: 52 MG/DL (ref 0–149)
TSH SERPL DL<=0.005 MIU/L-ACNC: 2.14 UIU/ML (ref 0.38–5.33)
VIT B12 SERPL-MCNC: 906 PG/ML (ref 211–911)
WBC # BLD AUTO: 5.8 K/UL (ref 4.8–10.8)

## 2023-11-10 PROCEDURE — 82607 VITAMIN B-12: CPT

## 2023-11-10 PROCEDURE — 80061 LIPID PANEL: CPT

## 2023-11-10 PROCEDURE — 84443 ASSAY THYROID STIM HORMONE: CPT

## 2023-11-10 PROCEDURE — 80053 COMPREHEN METABOLIC PANEL: CPT

## 2023-11-10 PROCEDURE — 83036 HEMOGLOBIN GLYCOSYLATED A1C: CPT

## 2023-11-10 PROCEDURE — 85027 COMPLETE CBC AUTOMATED: CPT

## 2023-11-10 PROCEDURE — 82306 VITAMIN D 25 HYDROXY: CPT

## 2023-12-01 ENCOUNTER — OFFICE VISIT (OUTPATIENT)
Dept: INTERNAL MEDICINE | Facility: IMAGING CENTER | Age: 73
End: 2023-12-01
Payer: COMMERCIAL

## 2023-12-01 VITALS
HEART RATE: 74 BPM | DIASTOLIC BLOOD PRESSURE: 62 MMHG | RESPIRATION RATE: 14 BRPM | TEMPERATURE: 98 F | SYSTOLIC BLOOD PRESSURE: 120 MMHG | OXYGEN SATURATION: 98 % | BODY MASS INDEX: 23.3 KG/M2 | HEIGHT: 72 IN | WEIGHT: 172 LBS

## 2023-12-01 DIAGNOSIS — Z23 NEED FOR VACCINATION: ICD-10-CM

## 2023-12-01 DIAGNOSIS — I10 PRIMARY HYPERTENSION: ICD-10-CM

## 2023-12-01 DIAGNOSIS — E78.5 HYPERLIPIDEMIA, UNSPECIFIED HYPERLIPIDEMIA TYPE: ICD-10-CM

## 2023-12-01 DIAGNOSIS — E55.9 VITAMIN D DEFICIENCY: ICD-10-CM

## 2023-12-01 DIAGNOSIS — R73.03 PREDIABETES: ICD-10-CM

## 2023-12-01 DIAGNOSIS — E53.8 B12 DEFICIENCY: ICD-10-CM

## 2023-12-01 PROCEDURE — 99215 OFFICE O/P EST HI 40 MIN: CPT | Mod: 25 | Performed by: FAMILY MEDICINE

## 2023-12-01 PROCEDURE — 3078F DIAST BP <80 MM HG: CPT | Performed by: FAMILY MEDICINE

## 2023-12-01 PROCEDURE — 90471 IMMUNIZATION ADMIN: CPT | Performed by: FAMILY MEDICINE

## 2023-12-01 PROCEDURE — 3074F SYST BP LT 130 MM HG: CPT | Performed by: FAMILY MEDICINE

## 2023-12-01 PROCEDURE — 90662 IIV NO PRSV INCREASED AG IM: CPT | Performed by: FAMILY MEDICINE

## 2023-12-01 RX ORDER — ASPIRIN 81 MG/1
81 TABLET ORAL DAILY
Qty: 90 TABLET | Refills: 3 | COMMUNITY
Start: 2023-12-01

## 2023-12-01 ASSESSMENT — FIBROSIS 4 INDEX: FIB4 SCORE: 2.3

## 2023-12-12 NOTE — PROGRESS NOTES
"  Subjective:     HPI:   Jose Fallon is a 73 y.o. male here  to discuss the evaluation and management of:   Chief Complaint   Patient presents with    Follow-Up   Overall he is doing well, here for lab follow-up    He sees cardiology every year  He is working regularly         Objective:     /62   Pulse 74   Temp 36.7 °C (98 °F)   Resp 14   Ht 1.829 m (6' 0.01\")   Wt 78 kg (172 lb)   SpO2 98%   BMI 23.32 kg/m²     Physical Exam:  Physical Exam  Constitutional:       General: He is not in acute distress.     Appearance: Normal appearance. He is normal weight.   HENT:      Head: Normocephalic and atraumatic.   Eyes:      Conjunctiva/sclera: Conjunctivae normal.   Cardiovascular:      Rate and Rhythm: Normal rate and regular rhythm.      Heart sounds: Normal heart sounds.   Pulmonary:      Effort: Pulmonary effort is normal. No respiratory distress.      Breath sounds: Normal breath sounds. No stridor. No wheezing, rhonchi or rales.   Musculoskeletal:      Right lower leg: No edema.      Left lower leg: No edema.   Skin:     General: Skin is warm and dry.   Neurological:      General: No focal deficit present.      Mental Status: He is alert.   Psychiatric:         Mood and Affect: Mood normal.         Behavior: Behavior normal.       Hospital Outpatient Visit on 11/10/2023   Component Date Value Ref Range Status    Glycohemoglobin 11/10/2023 5.5  4.0 - 5.6 % Final    Comment: Increased risk for diabetes:  5.7 -6.4%  Diabetes:  >6.4%  Glycemic control for adults with diabetes:  <7.0%    The above interpretations are per ADA guidelines.  Diagnosis  of diabetes mellitus on the basis of elevated Hemoglobin A1c  should be confirmed by repeating the Hb A1c test.      Est Avg Glucose 11/10/2023 111  mg/dL Final    Comment: The eAG calculation is based on the A1c-Derived Daily Glucose  (ADAG) study.  See the ADA's website for additional information.      Vitamin B12 -True Cobalamin 11/10/2023 906  211 - " 911 pg/mL Final    Sodium 11/10/2023 141  135 - 145 mmol/L Final    Potassium 11/10/2023 3.9  3.6 - 5.5 mmol/L Final    Chloride 11/10/2023 105  96 - 112 mmol/L Final    Co2 11/10/2023 28  20 - 33 mmol/L Final    Anion Gap 11/10/2023 8.0  7.0 - 16.0 Final    Glucose 11/10/2023 92  65 - 99 mg/dL Final    Bun 11/10/2023 25 (H)  8 - 22 mg/dL Final    Creatinine 11/10/2023 1.10  0.50 - 1.40 mg/dL Final    Calcium 11/10/2023 9.0  8.5 - 10.5 mg/dL Final    Correct Calcium 11/10/2023 8.9  8.5 - 10.5 mg/dL Final    AST(SGOT) 11/10/2023 33  12 - 45 U/L Final    ALT(SGPT) 11/10/2023 23  2 - 50 U/L Final    Alkaline Phosphatase 11/10/2023 55  30 - 99 U/L Final    Total Bilirubin 11/10/2023 0.4  0.1 - 1.5 mg/dL Final    Albumin 11/10/2023 4.1  3.2 - 4.9 g/dL Final    Total Protein 11/10/2023 7.4  6.0 - 8.2 g/dL Final    Globulin 11/10/2023 3.3  1.9 - 3.5 g/dL Final    A-G Ratio 11/10/2023 1.2  g/dL Final    Cholesterol,Tot 11/10/2023 122  100 - 199 mg/dL Final    Triglycerides 11/10/2023 52  0 - 149 mg/dL Final    HDL 11/10/2023 62  >=40 mg/dL Final    LDL 11/10/2023 50  <100 mg/dL Final    TSH 11/10/2023 2.140  0.380 - 5.330 uIU/mL Final    Comment: The 2011 American Thyroid Association (MARY BETH) guidelines  recommended that the interpretation of thyroid function in  pregnancy be based on trimester specific reference ranges.    1st Trimester  0.100-2.500 mIU/L  2nd Trimester  0.200-3.000 mIU/L  3rd Trimester  0.300-3.500 mIU/L    These established reference ranges have not been validated  at [facility].      WBC 11/10/2023 5.8  4.8 - 10.8 K/uL Final    RBC 11/10/2023 4.50 (L)  4.70 - 6.10 M/uL Final    Hemoglobin 11/10/2023 13.7 (L)  14.0 - 18.0 g/dL Final    Hematocrit 11/10/2023 42.0  42.0 - 52.0 % Final    MCV 11/10/2023 93.3  81.4 - 97.8 fL Final    MCH 11/10/2023 30.4  27.0 - 33.0 pg Final    MCHC 11/10/2023 32.6  32.3 - 36.5 g/dL Final    Please note new reference range effective 05/22/2023.    RDW 11/10/2023  42.4  35.9 - 50.0 fL Final    Platelet Count 11/10/2023 221  164 - 446 K/uL Final    MPV 11/10/2023 9.8  9.0 - 12.9 fL Final    25-Hydroxy   Vitamin D 25 11/10/2023 45  30 - 100 ng/mL Final    Comment: Adult Ranges:   <20 ng/mL - Deficiency  20-29 ng/mL - Insufficiency   ng/mL - Sufficiency  Electrochemiluminescence binding assay performed using Roche stefania e  immunoassay analyzer.  The Elecsys Vitamin D total II assay is intended for  the quantitative determination of total 25 hydroxyvitamin D in human serum  and plasma. This assay is to be used as an aid in the assessment of vitamin  D sufficiency in adults.      GFR (CKD-EPI) 11/10/2023 70  >60 mL/min/1.73 m 2 Final    Comment: Estimated Glomerular Filtration Rate is calculated using  race neutral CKD-EPI 2021 equation per NKF-ASN recommendations.     ]        Assessment and Plan:     The following treatment plan was discussed/researched:  1. Primary hypertension  -stable continue current regimen      2. Hyperlipidemia, unspecified hyperlipidemia type  Stable continue current meds    3. Prediabetes  Stable    4. Vitamin D deficiency  -Continue with current regimen    5. B12 deficiency  Well-managed with supplements    6. Need for vaccination    - INFLUENZA VACCINE, HIGH DOSE (65+ ONLY)          Medical Decision Making: Today's Assessment/Status/Plan:                                                                                                                                                                                    Any change or worsening of signs or symptoms, patient encouraged to follow-up or report to emergency room for further evaluation. Patient verbalizes understanding and agrees.    Follow-Up: No follow-ups on file.    6 months for annual wellness      PLEASE NOTE: This dictation was created using voice recognition software. I have made every reasonable attempt to correct obvious errors, but I expect that there are errors of grammar and  possibly content that I did not discover before finalizing the note.      My total time spent caring for the patient on the day of the encounter was  greater than 40 minutes.   This includes obtaining history, reviewing chart, physical exam, patient education, reviewing outside records, placing orders, interpreting tests and coordinating care.

## 2024-03-03 DIAGNOSIS — R73.03 PREDIABETES: ICD-10-CM

## 2024-03-04 RX ORDER — METFORMIN HYDROCHLORIDE 500 MG/1
500 TABLET, EXTENDED RELEASE ORAL
Qty: 90 TABLET | Refills: 3 | Status: SHIPPED | OUTPATIENT
Start: 2024-03-04

## 2024-05-23 ENCOUNTER — APPOINTMENT (OUTPATIENT)
Dept: INTERNAL MEDICINE | Facility: IMAGING CENTER | Age: 74
End: 2024-05-23
Payer: COMMERCIAL

## 2024-05-23 ENCOUNTER — TELEPHONE (OUTPATIENT)
Dept: INTERNAL MEDICINE | Facility: IMAGING CENTER | Age: 74
End: 2024-05-23

## 2024-05-23 DIAGNOSIS — Z12.5 PROSTATE CANCER SCREENING: ICD-10-CM

## 2024-05-23 DIAGNOSIS — Z79.899 MEDICATION MANAGEMENT: ICD-10-CM

## 2024-05-23 NOTE — TELEPHONE ENCOUNTER
----- Message from Linda Avila R.N. sent at 5/22/2024  3:38 PM PDT -----  Please order annual labs.    Thanks  Linda

## 2024-05-29 ENCOUNTER — HOSPITAL ENCOUNTER (OUTPATIENT)
Facility: MEDICAL CENTER | Age: 74
End: 2024-05-29
Attending: FAMILY MEDICINE
Payer: COMMERCIAL

## 2024-05-29 ENCOUNTER — NON-PROVIDER VISIT (OUTPATIENT)
Dept: INTERNAL MEDICINE | Facility: IMAGING CENTER | Age: 74
End: 2024-05-29
Payer: COMMERCIAL

## 2024-05-29 DIAGNOSIS — Z79.899 MEDICATION MANAGEMENT: ICD-10-CM

## 2024-05-29 DIAGNOSIS — Z12.5 PROSTATE CANCER SCREENING: ICD-10-CM

## 2024-05-29 LAB
25(OH)D3 SERPL-MCNC: 48 NG/ML (ref 30–100)
ALBUMIN SERPL BCP-MCNC: 3.8 G/DL (ref 3.2–4.9)
ALBUMIN/GLOB SERPL: 1.2 G/DL
ALP SERPL-CCNC: 64 U/L (ref 30–99)
ALT SERPL-CCNC: 22 U/L (ref 2–50)
ANION GAP SERPL CALC-SCNC: 11 MMOL/L (ref 7–16)
AST SERPL-CCNC: 29 U/L (ref 12–45)
BILIRUB SERPL-MCNC: 0.6 MG/DL (ref 0.1–1.5)
BUN SERPL-MCNC: 25 MG/DL (ref 8–22)
CALCIUM ALBUM COR SERPL-MCNC: 9.2 MG/DL (ref 8.5–10.5)
CALCIUM SERPL-MCNC: 9 MG/DL (ref 8.5–10.5)
CHLORIDE SERPL-SCNC: 101 MMOL/L (ref 96–112)
CHOLEST SERPL-MCNC: 113 MG/DL (ref 100–199)
CO2 SERPL-SCNC: 26 MMOL/L (ref 20–33)
CREAT SERPL-MCNC: 1.01 MG/DL (ref 0.5–1.4)
ERYTHROCYTE [DISTWIDTH] IN BLOOD BY AUTOMATED COUNT: 40.7 FL (ref 35.9–50)
EST. AVERAGE GLUCOSE BLD GHB EST-MCNC: 114 MG/DL
GFR SERPLBLD CREATININE-BSD FMLA CKD-EPI: 78 ML/MIN/1.73 M 2
GLOBULIN SER CALC-MCNC: 3.2 G/DL (ref 1.9–3.5)
GLUCOSE SERPL-MCNC: 92 MG/DL (ref 65–99)
HBA1C MFR BLD: 5.6 % (ref 4–5.6)
HCT VFR BLD AUTO: 39.5 % (ref 42–52)
HDLC SERPL-MCNC: 58 MG/DL
HGB BLD-MCNC: 13.2 G/DL (ref 14–18)
LDLC SERPL CALC-MCNC: 47 MG/DL
MCH RBC QN AUTO: 30.6 PG (ref 27–33)
MCHC RBC AUTO-ENTMCNC: 33.4 G/DL (ref 32.3–36.5)
MCV RBC AUTO: 91.4 FL (ref 81.4–97.8)
PLATELET # BLD AUTO: 237 K/UL (ref 164–446)
PMV BLD AUTO: 10.3 FL (ref 9–12.9)
POTASSIUM SERPL-SCNC: 3.6 MMOL/L (ref 3.6–5.5)
PROT SERPL-MCNC: 7 G/DL (ref 6–8.2)
PSA SERPL-MCNC: 1.32 NG/ML (ref 0–4)
RBC # BLD AUTO: 4.32 M/UL (ref 4.7–6.1)
SODIUM SERPL-SCNC: 138 MMOL/L (ref 135–145)
TRIGL SERPL-MCNC: 39 MG/DL (ref 0–149)
TSH SERPL DL<=0.005 MIU/L-ACNC: 1.63 UIU/ML (ref 0.38–5.33)
VIT B12 SERPL-MCNC: 938 PG/ML (ref 211–911)
WBC # BLD AUTO: 4.7 K/UL (ref 4.8–10.8)

## 2024-05-31 ENCOUNTER — OFFICE VISIT (OUTPATIENT)
Dept: INTERNAL MEDICINE | Facility: IMAGING CENTER | Age: 74
End: 2024-05-31
Payer: COMMERCIAL

## 2024-05-31 VITALS
HEART RATE: 83 BPM | DIASTOLIC BLOOD PRESSURE: 60 MMHG | SYSTOLIC BLOOD PRESSURE: 104 MMHG | WEIGHT: 175 LBS | HEIGHT: 72 IN | RESPIRATION RATE: 14 BRPM | OXYGEN SATURATION: 95 % | BODY MASS INDEX: 23.7 KG/M2 | TEMPERATURE: 97.5 F

## 2024-05-31 DIAGNOSIS — Z79.899 MEDICATION MANAGEMENT: ICD-10-CM

## 2024-05-31 DIAGNOSIS — E53.8 B12 DEFICIENCY: ICD-10-CM

## 2024-05-31 DIAGNOSIS — F41.9 ANXIETY: ICD-10-CM

## 2024-05-31 DIAGNOSIS — R73.03 PREDIABETES: ICD-10-CM

## 2024-05-31 DIAGNOSIS — I10 PRIMARY HYPERTENSION: ICD-10-CM

## 2024-05-31 DIAGNOSIS — Z00.00 WELLNESS EXAMINATION: Primary | ICD-10-CM

## 2024-05-31 DIAGNOSIS — E55.9 VITAMIN D DEFICIENCY: ICD-10-CM

## 2024-05-31 ASSESSMENT — ACTIVITIES OF DAILY LIVING (ADL): BATHING_REQUIRES_ASSISTANCE: 0

## 2024-05-31 ASSESSMENT — PATIENT HEALTH QUESTIONNAIRE - PHQ9: CLINICAL INTERPRETATION OF PHQ2 SCORE: 0

## 2024-05-31 ASSESSMENT — ENCOUNTER SYMPTOMS: GENERAL WELL-BEING: EXCELLENT

## 2024-05-31 ASSESSMENT — FIBROSIS 4 INDEX: FIB4 SCORE: 1.96

## 2024-05-31 NOTE — PROGRESS NOTES
Verbal consent was acquired by the patient to use [employer] listening note generation during this visit     Patient is a 74 y.o.male.established patient      History of Present Illness  The patient is here for his annual wellness exam. His medical conditions include primary hypertension, hyperlipidemia, prediabetes, vitamins D and B12 deficiencies. In terms of his medication, he is on metformin, aspirin, Zetia, Avalide, Lexapro, and Zocor.      Anxiety-stable on Lexapro    The patient experienced anxiety during a recent trip to the airport, which was exacerbated by stressful conditions.          He maintains a sedentary lifestyle during the winter months, but maintains an active lifestyle in Spring and Summer through golfing. His diet  , primarily consisting of  some beef but mostly chicken and pork, with occasional fish consumption. His hydration  consists of water, particularly during golf sessions, and occasional consumption of diet soda or beer with dinner. He denies any hearing difficulties, headaches, chest pain, shortness of breath, leg swelling, numbness, tingling, burning, or weakness in his legs. His bowel movements are regular.    Supplemental Information    He has been on metformin for a long time for prediabetes.   He sees a cardiologist annually  He had a stress test done the first time he saw a cardiologist and it was read as possibly abnormal, and they did a repeat it, which was normal.   He denies any chest pain or shortness of breath. They suspect a small  opening between the chambers. He has never had any issues with his heart.     He gets his vision checked regularly. He has epiretinal membranes of retina on his right eye .   He sees a stretched image and slightly tilted in one direction.   He saw his eye dr earlier this month, and he will see him again in 09/2024. He has a bunch of floaters in his eye too     It does not affect his short vision at all, but he struggles with distance  "vision. He has had LASIK along with cataracts.     He drinks alcohol once or twice a week with dinner.   He has received his COVID-19 booster and RSV vaccine.            /60   Pulse 83   Temp 36.4 °C (97.5 °F)   Resp 14   Ht 1.829 m (6' 0.01\")   Wt 79.4 kg (175 lb)   SpO2 95% , Body mass index is 23.73 kg/m².    Physical Exam      Physical Exam  Constitutional:       General: He is not in acute distress.     Appearance: Normal appearance. He is normal weight. He is not ill-appearing, toxic-appearing or diaphoretic.   HENT:      Head: Normocephalic and atraumatic.      Right Ear: Tympanic membrane and external ear normal. There is no impacted cerumen.      Left Ear: Tympanic membrane normal. There is no impacted cerumen.      Nose: Nose normal.      Mouth/Throat:      Pharynx: Oropharynx is clear.   Eyes:      Conjunctiva/sclera: Conjunctivae normal.   Cardiovascular:      Rate and Rhythm: Normal rate and regular rhythm.   Pulmonary:      Effort: Pulmonary effort is normal.      Breath sounds: Normal breath sounds.   Abdominal:      General: Abdomen is flat. There is no distension.      Palpations: Abdomen is soft. There is no mass.      Tenderness: There is no abdominal tenderness.   Musculoskeletal:      Cervical back: Neck supple.   Neurological:      General: No focal deficit present.      Mental Status: He is alert.   Psychiatric:         Mood and Affect: Mood normal.         Behavior: Behavior normal.         Thought Content: Thought content normal.         Judgment: Judgment normal.             Results  Laboratory Studies  A1c slightly increased. Thyroid function is normal. Fasting glucose is normal. Electrolytes, liver function, and kidney function are normal. B12 level is normal. Hemoglobin, white blood cells, and red blood cells are normal. Prostate level is normal. LDL is in the 40s.          Assessment & Plan  1. Annual wellness exam.  The patient's blood pressure is well-regulated. His " vitamin B12 level is within the normal range .   Despite a slight elevation in his A1c, his blood counts have remained stable  Continues w/ a mild case of anemia.   His cholesterol levels are well-managed with his current medication regimen.   The patient is advised to maintain his current regimen of B12 supplements.   Dietary recommendations include iron-rich foods such as beans, spinach, dried fruits, raisins, apricots, iron-fortified cereals, bread, and pasta. He is encouraged to increase his water intake to 80 ounces daily and to replace his consumption of diet soda with plain water.     He is due for a colonoscopy in 2026. His tetanus vaccine is not due until 2028.   He will have an appointment with his cardiologist.       2. Primary hypertension  --stable continue current regimen     3. Medication management     - CBC WITHOUT DIFFERENTIAL; Future  - TSH; Future  - Lipid Profile; Future  - Comp Metabolic Panel; Future  - HEMOGLOBIN A1C; Future    4. B12 deficiency  --stable continue current regimen     5. Vitamin D deficiency  -stable continue current regimen      6. Prediabetes  -stable continue current regimen     7. Anxiety   Stable on  Lexapro        Follow-up  The patient is scheduled for a follow-up visit in 6 months.               This note was created using voice recognition software (Dragon). The accuracy of the dictation is limited by the abilities of the software. I have reviewed the note prior to signing, however some errors in grammar and context are still possible. If you have any questions related to this note please do not hesitate to contact our office.

## 2024-06-03 DIAGNOSIS — E78.5 DYSLIPIDEMIA: ICD-10-CM

## 2024-06-03 RX ORDER — SIMVASTATIN 20 MG
20 TABLET ORAL
Qty: 90 TABLET | Refills: 3 | Status: SHIPPED | OUTPATIENT
Start: 2024-06-03

## 2024-06-21 RX ORDER — ESCITALOPRAM OXALATE 10 MG/1
TABLET ORAL
Qty: 90 TABLET | Refills: 3 | Status: SHIPPED | OUTPATIENT
Start: 2024-06-21

## 2024-06-28 DIAGNOSIS — I10 ESSENTIAL HYPERTENSION: ICD-10-CM

## 2024-06-28 RX ORDER — IRBESARTAN AND HYDROCHLOROTHIAZIDE 150; 12.5 MG/1; MG/1
TABLET, FILM COATED ORAL
Qty: 90 TABLET | Refills: 3 | Status: SHIPPED | OUTPATIENT
Start: 2024-06-28

## 2024-08-11 DIAGNOSIS — E78.2 MIXED HYPERLIPIDEMIA: ICD-10-CM

## 2024-08-12 RX ORDER — EZETIMIBE 10 MG/1
10 TABLET ORAL
Qty: 90 TABLET | Refills: 3 | Status: SHIPPED | OUTPATIENT
Start: 2024-08-12

## 2024-12-02 ENCOUNTER — APPOINTMENT (OUTPATIENT)
Dept: INTERNAL MEDICINE | Facility: IMAGING CENTER | Age: 74
End: 2024-12-02
Payer: COMMERCIAL

## 2024-12-03 ENCOUNTER — HOSPITAL ENCOUNTER (OUTPATIENT)
Facility: MEDICAL CENTER | Age: 74
End: 2024-12-03
Attending: FAMILY MEDICINE
Payer: COMMERCIAL

## 2024-12-03 ENCOUNTER — APPOINTMENT (OUTPATIENT)
Dept: INTERNAL MEDICINE | Facility: IMAGING CENTER | Age: 74
End: 2024-12-03
Payer: COMMERCIAL

## 2024-12-03 DIAGNOSIS — Z01.89 ENCOUNTER FOR ROUTINE LABORATORY TESTING: ICD-10-CM

## 2024-12-03 DIAGNOSIS — Z79.899 MEDICATION MANAGEMENT: ICD-10-CM

## 2024-12-03 LAB
ALBUMIN SERPL BCP-MCNC: 4.1 G/DL (ref 3.2–4.9)
ALBUMIN/GLOB SERPL: 1.2 G/DL
ALP SERPL-CCNC: 61 U/L (ref 30–99)
ALT SERPL-CCNC: 23 U/L (ref 2–50)
ANION GAP SERPL CALC-SCNC: 9 MMOL/L (ref 7–16)
AST SERPL-CCNC: 35 U/L (ref 12–45)
BILIRUB SERPL-MCNC: 0.6 MG/DL (ref 0.1–1.5)
BUN SERPL-MCNC: 24 MG/DL (ref 8–22)
CALCIUM ALBUM COR SERPL-MCNC: 9.3 MG/DL (ref 8.5–10.5)
CALCIUM SERPL-MCNC: 9.4 MG/DL (ref 8.5–10.5)
CHLORIDE SERPL-SCNC: 105 MMOL/L (ref 96–112)
CHOLEST SERPL-MCNC: 124 MG/DL (ref 100–199)
CO2 SERPL-SCNC: 28 MMOL/L (ref 20–33)
CREAT SERPL-MCNC: 1.22 MG/DL (ref 0.5–1.4)
ERYTHROCYTE [DISTWIDTH] IN BLOOD BY AUTOMATED COUNT: 42.4 FL (ref 35.9–50)
EST. AVERAGE GLUCOSE BLD GHB EST-MCNC: 128 MG/DL
GFR SERPLBLD CREATININE-BSD FMLA CKD-EPI: 62 ML/MIN/1.73 M 2
GLOBULIN SER CALC-MCNC: 3.5 G/DL (ref 1.9–3.5)
GLUCOSE SERPL-MCNC: 89 MG/DL (ref 65–99)
HBA1C MFR BLD: 6.1 % (ref 4–5.6)
HCT VFR BLD AUTO: 41.3 % (ref 42–52)
HDLC SERPL-MCNC: 62 MG/DL
HGB BLD-MCNC: 13.2 G/DL (ref 14–18)
LDLC SERPL CALC-MCNC: 51 MG/DL
MCH RBC QN AUTO: 29.7 PG (ref 27–33)
MCHC RBC AUTO-ENTMCNC: 32 G/DL (ref 32.3–36.5)
MCV RBC AUTO: 92.8 FL (ref 81.4–97.8)
PLATELET # BLD AUTO: 241 K/UL (ref 164–446)
PMV BLD AUTO: 10.2 FL (ref 9–12.9)
POTASSIUM SERPL-SCNC: 3.6 MMOL/L (ref 3.6–5.5)
PROT SERPL-MCNC: 7.6 G/DL (ref 6–8.2)
RBC # BLD AUTO: 4.45 M/UL (ref 4.7–6.1)
SODIUM SERPL-SCNC: 142 MMOL/L (ref 135–145)
TRIGL SERPL-MCNC: 57 MG/DL (ref 0–149)
TSH SERPL-ACNC: 1.88 UIU/ML (ref 0.35–5.5)
WBC # BLD AUTO: 5.6 K/UL (ref 4.8–10.8)

## 2024-12-03 PROCEDURE — 99999 PR NO CHARGE: CPT

## 2024-12-03 PROCEDURE — 84443 ASSAY THYROID STIM HORMONE: CPT

## 2024-12-03 PROCEDURE — 80061 LIPID PANEL: CPT

## 2024-12-03 PROCEDURE — 80053 COMPREHEN METABOLIC PANEL: CPT

## 2024-12-03 PROCEDURE — 85027 COMPLETE CBC AUTOMATED: CPT

## 2024-12-03 PROCEDURE — 83036 HEMOGLOBIN GLYCOSYLATED A1C: CPT

## 2024-12-06 ENCOUNTER — OFFICE VISIT (OUTPATIENT)
Dept: INTERNAL MEDICINE | Facility: IMAGING CENTER | Age: 74
End: 2024-12-06
Payer: COMMERCIAL

## 2024-12-06 VITALS
OXYGEN SATURATION: 98 % | SYSTOLIC BLOOD PRESSURE: 112 MMHG | RESPIRATION RATE: 14 BRPM | BODY MASS INDEX: 23.3 KG/M2 | WEIGHT: 172 LBS | TEMPERATURE: 97.8 F | HEIGHT: 72 IN | HEART RATE: 77 BPM | DIASTOLIC BLOOD PRESSURE: 70 MMHG

## 2024-12-06 DIAGNOSIS — Z23 NEED FOR VACCINATION: ICD-10-CM

## 2024-12-06 DIAGNOSIS — E53.8 B12 DEFICIENCY: ICD-10-CM

## 2024-12-06 DIAGNOSIS — E55.9 VITAMIN D DEFICIENCY: ICD-10-CM

## 2024-12-06 DIAGNOSIS — I10 PRIMARY HYPERTENSION: ICD-10-CM

## 2024-12-06 DIAGNOSIS — R73.03 PREDIABETES: ICD-10-CM

## 2024-12-06 DIAGNOSIS — E78.5 HYPERLIPIDEMIA, UNSPECIFIED HYPERLIPIDEMIA TYPE: ICD-10-CM

## 2024-12-06 PROCEDURE — 3078F DIAST BP <80 MM HG: CPT | Performed by: FAMILY MEDICINE

## 2024-12-06 PROCEDURE — 3074F SYST BP LT 130 MM HG: CPT | Performed by: FAMILY MEDICINE

## 2024-12-06 PROCEDURE — 90662 IIV NO PRSV INCREASED AG IM: CPT | Performed by: FAMILY MEDICINE

## 2024-12-06 PROCEDURE — 99214 OFFICE O/P EST MOD 30 MIN: CPT | Mod: 25 | Performed by: FAMILY MEDICINE

## 2024-12-06 PROCEDURE — 90471 IMMUNIZATION ADMIN: CPT | Performed by: FAMILY MEDICINE

## 2024-12-06 ASSESSMENT — FIBROSIS 4 INDEX: FIB4 SCORE: 2.27

## 2024-12-26 NOTE — PROGRESS NOTES
"Verbal consent was acquired by the patient to use [employer] ambient listening note generation during this visit     Patient is a 74 y.o.male.established patient     History of Present Illness  The patient presents for a follow-up.    He reports no current health concerns.    He contracted COVID-19 in 2022, which presented as a mild case with a severe sore throat but no loss of taste or smell.    He has a history of prediabetes and is currently on metformin as a preventative measure. He also takes two statins for cholesterol management.    He has been diagnosed with anemia and was advised to monitor it. He has a history of B12 deficiency.    He undergoes colonoscopies every 5 years due to a family history of colon cancer. His first colonoscopy at age 49 revealed 3 polyps, which were removed. Subsequent colonoscopies have been clear. His last colonoscopy was in .    He does not have a regular exercise routine but used to walk regularly before his knee replacement surgery.    FAMILY HISTORY  He denies any family history of diabetes. His father had colon cancer and  of kidney failure. His grandfather  of a heart attack at the age of 60.    IMMUNIZATIONS  He received RSV vaccine last year. He also received COVID-19 booster.            /70   Pulse 77   Temp 36.6 °C (97.8 °F)   Resp 14   Ht 1.829 m (6' 0.01\")   Wt 78 kg (172 lb)   SpO2 98% , Body mass index is 23.32 kg/m².    Physical Exam      Physical Exam  Constitutional:       General: He is not in acute distress.     Appearance: Normal appearance. He is normal weight. He is not ill-appearing, toxic-appearing or diaphoretic.   HENT:      Head: Normocephalic and atraumatic.      Ears:      Comments:       Nose: Nose normal.   Eyes:      Conjunctiva/sclera: Conjunctivae normal.   Cardiovascular:      Rate and Rhythm: Normal rate.   Pulmonary:      Effort: Pulmonary effort is normal.   Musculoskeletal:      Cervical back: Neck supple. "   Neurological:      General: No focal deficit present.      Mental Status: He is alert.   Psychiatric:         Mood and Affect: Mood normal.         Behavior: Behavior normal.         Thought Content: Thought content normal.         Judgment: Judgment normal.             Results  Laboratory Studies  Thyroid function is normal. Cholesterol levels are good. Electrolytes are normal. Fasting glucose is normal. Liver functions are normal. Blood counts are stable. Kidney function is normal.          Assessment & Plan  1. Anemia.  His anemia is stable and has been consistent over the years. He is advised to continue monitoring it and to maintain his current regimen, including B12 supplementation as needed.    2. Prediabetes.  He is currently taking metformin as a preventative measure. He is advised to continue this medication. He is encouraged to maintain a balanced diet and to monitor his blood sugar levels regularly.    3. Hyperlipidemia.  He is on two statins, which have kept his cholesterol levels stable. His good cholesterol has increased slightly, and his bad cholesterol remains consistent. He is advised to continue his current statin regimen.    4. Hypertension.  His blood pressure is well-controlled. He is advised to continue his current management plan and to monitor his blood pressure regularly.    5. Vitamin B12 Deficiency.  His B12 levels were slightly high in May, but he is advised to continue his current dosage as the kidneys will excrete any excess.    6. Health Maintenance.  He will receive his influenza vaccine today. He has had the RSV vaccine last year and plans to get his Covid booster at [facility]. He is advised to maintain adequate hydration and to incorporate regular exercise into his routine. He is also reminded to follow up on his colonoscopy schedule, with the next one potentially due on July 26.                   This note was created using voice recognition software (Dragon). The accuracy of the  dictation is limited by the abilities of the software. I have reviewed the note prior to signing, however some errors in grammar and context are still possible. If you have any questions related to this note please do not hesitate to contact our office.

## 2025-01-21 ENCOUNTER — APPOINTMENT (OUTPATIENT)
Dept: ADMISSIONS | Facility: MEDICAL CENTER | Age: 75
End: 2025-01-21
Attending: OPHTHALMOLOGY
Payer: COMMERCIAL

## 2025-01-28 ENCOUNTER — PRE-ADMISSION TESTING (OUTPATIENT)
Dept: ADMISSIONS | Facility: MEDICAL CENTER | Age: 75
End: 2025-01-28
Attending: OPHTHALMOLOGY
Payer: COMMERCIAL

## 2025-01-28 DIAGNOSIS — Z01.810 PRE-OPERATIVE CARDIOVASCULAR EXAMINATION: ICD-10-CM

## 2025-01-28 DIAGNOSIS — Z01.812 PRE-OPERATIVE LABORATORY EXAMINATION: ICD-10-CM

## 2025-01-28 RX ORDER — VITS A,C,E/LUTEIN/MINERALS 300MCG-200
1 TABLET ORAL DAILY
COMMUNITY

## 2025-01-28 RX ORDER — VITAMIN B COMPLEX
1000 TABLET ORAL DAILY
COMMUNITY

## 2025-01-28 NOTE — PREADMIT AVS NOTE
Current Medications   Medication Instructions    vitamin D3 (CHOLECALCIFEROL) 1000 Unit (25 mcg) Tab Stop 7 days before surgery    Multiple Vitamins-Minerals (OCUVITE-LUTEIN) Tab Stop 7 days before surgery    B Complex Vitamins (VITAMIN B COMPLEX PO) Stop 7 days before surgery    Coenzyme Q10 200 MG Cap Stop 7 days before surgery    escitalopram (LEXAPRO) 10 MG Tab Continue until night before surgery    ezetimibe (ZETIA) 10 MG Tab Continue until the night before the surgery    irbesartan-hydrochlorothiazide (AVALIDE) 150-12.5 MG per tablet Stop 24 hours before surgery    simvastatin (ZOCOR) 20 MG Tab Continue until night before surgery    metFORMIN ER (GLUCOPHAGE XR) 500 MG TABLET SR 24 HR Hold medication day of procedure    aspirin 81 MG EC tablet Follow instructions from surgeon or specialist.

## 2025-01-31 ENCOUNTER — PRE-ADMISSION TESTING (OUTPATIENT)
Dept: ADMISSIONS | Facility: MEDICAL CENTER | Age: 75
End: 2025-01-31
Attending: OPHTHALMOLOGY
Payer: COMMERCIAL

## 2025-01-31 DIAGNOSIS — Z01.812 PRE-OPERATIVE LABORATORY EXAMINATION: ICD-10-CM

## 2025-01-31 DIAGNOSIS — Z01.810 PRE-OPERATIVE CARDIOVASCULAR EXAMINATION: ICD-10-CM

## 2025-01-31 LAB
ANION GAP SERPL CALC-SCNC: 10 MMOL/L (ref 7–16)
BUN SERPL-MCNC: 19 MG/DL (ref 8–22)
CALCIUM SERPL-MCNC: 9 MG/DL (ref 8.5–10.5)
CHLORIDE SERPL-SCNC: 103 MMOL/L (ref 96–112)
CO2 SERPL-SCNC: 28 MMOL/L (ref 20–33)
CREAT SERPL-MCNC: 1.35 MG/DL (ref 0.5–1.4)
EKG IMPRESSION: NORMAL
GFR SERPLBLD CREATININE-BSD FMLA CKD-EPI: 55 ML/MIN/1.73 M 2
GLUCOSE SERPL-MCNC: 98 MG/DL (ref 65–99)
POTASSIUM SERPL-SCNC: 3.8 MMOL/L (ref 3.6–5.5)
SODIUM SERPL-SCNC: 141 MMOL/L (ref 135–145)

## 2025-01-31 PROCEDURE — 80048 BASIC METABOLIC PNL TOTAL CA: CPT

## 2025-01-31 PROCEDURE — 93010 ELECTROCARDIOGRAM REPORT: CPT | Performed by: INTERNAL MEDICINE

## 2025-01-31 PROCEDURE — 36415 COLL VENOUS BLD VENIPUNCTURE: CPT

## 2025-01-31 PROCEDURE — 93005 ELECTROCARDIOGRAM TRACING: CPT | Mod: TC

## 2025-02-12 ENCOUNTER — HOSPITAL ENCOUNTER (OUTPATIENT)
Facility: MEDICAL CENTER | Age: 75
End: 2025-02-12
Attending: OPHTHALMOLOGY | Admitting: OPHTHALMOLOGY
Payer: COMMERCIAL

## 2025-02-12 ENCOUNTER — ANESTHESIA EVENT (OUTPATIENT)
Dept: SURGERY | Facility: MEDICAL CENTER | Age: 75
End: 2025-02-12
Payer: COMMERCIAL

## 2025-02-12 ENCOUNTER — ANESTHESIA (OUTPATIENT)
Dept: SURGERY | Facility: MEDICAL CENTER | Age: 75
End: 2025-02-12
Payer: COMMERCIAL

## 2025-02-12 VITALS
TEMPERATURE: 97.4 F | BODY MASS INDEX: 24.08 KG/M2 | HEIGHT: 70 IN | OXYGEN SATURATION: 97 % | WEIGHT: 168.21 LBS | RESPIRATION RATE: 14 BRPM | HEART RATE: 82 BPM | SYSTOLIC BLOOD PRESSURE: 100 MMHG | DIASTOLIC BLOOD PRESSURE: 59 MMHG

## 2025-02-12 LAB — GLUCOSE BLD STRIP.AUTO-MCNC: 90 MG/DL (ref 65–99)

## 2025-02-12 PROCEDURE — 700105 HCHG RX REV CODE 258: Performed by: OPHTHALMOLOGY

## 2025-02-12 PROCEDURE — 160002 HCHG RECOVERY MINUTES (STAT): Performed by: OPHTHALMOLOGY

## 2025-02-12 PROCEDURE — 160035 HCHG PACU - 1ST 60 MINS PHASE I: Performed by: OPHTHALMOLOGY

## 2025-02-12 PROCEDURE — 160046 HCHG PACU - 1ST 60 MINS PHASE II: Performed by: OPHTHALMOLOGY

## 2025-02-12 PROCEDURE — 700101 HCHG RX REV CODE 250: Performed by: ANESTHESIOLOGY

## 2025-02-12 PROCEDURE — 700102 HCHG RX REV CODE 250 W/ 637 OVERRIDE(OP): Performed by: ANESTHESIOLOGY

## 2025-02-12 PROCEDURE — 160048 HCHG OR STATISTICAL LEVEL 1-5: Performed by: OPHTHALMOLOGY

## 2025-02-12 PROCEDURE — 160009 HCHG ANES TIME/MIN: Performed by: OPHTHALMOLOGY

## 2025-02-12 PROCEDURE — 160041 HCHG SURGERY MINUTES - EA ADDL 1 MIN LEVEL 4: Performed by: OPHTHALMOLOGY

## 2025-02-12 PROCEDURE — 700101 HCHG RX REV CODE 250: Performed by: OPHTHALMOLOGY

## 2025-02-12 PROCEDURE — 700111 HCHG RX REV CODE 636 W/ 250 OVERRIDE (IP): Performed by: ANESTHESIOLOGY

## 2025-02-12 PROCEDURE — A9270 NON-COVERED ITEM OR SERVICE: HCPCS | Performed by: ANESTHESIOLOGY

## 2025-02-12 PROCEDURE — 160029 HCHG SURGERY MINUTES - 1ST 30 MINS LEVEL 4: Performed by: OPHTHALMOLOGY

## 2025-02-12 PROCEDURE — 160015 HCHG STAT PREOP MINUTES: Performed by: OPHTHALMOLOGY

## 2025-02-12 PROCEDURE — 82962 GLUCOSE BLOOD TEST: CPT

## 2025-02-12 PROCEDURE — 160025 RECOVERY II MINUTES (STATS): Performed by: OPHTHALMOLOGY

## 2025-02-12 PROCEDURE — 700111 HCHG RX REV CODE 636 W/ 250 OVERRIDE (IP): Performed by: OPHTHALMOLOGY

## 2025-02-12 RX ORDER — POVIDONE-IODINE 5 %
SOLUTION, NON-ORAL OPHTHALMIC (EYE)
Status: DISCONTINUED | OUTPATIENT
Start: 2025-02-12 | End: 2025-02-12 | Stop reason: HOSPADM

## 2025-02-12 RX ORDER — DEXAMETHASONE SODIUM PHOSPHATE 4 MG/ML
INJECTION, SOLUTION INTRA-ARTICULAR; INTRALESIONAL; INTRAMUSCULAR; INTRAVENOUS; SOFT TISSUE PRN
Status: DISCONTINUED | OUTPATIENT
Start: 2025-02-12 | End: 2025-02-12 | Stop reason: SURG

## 2025-02-12 RX ORDER — SODIUM CHLORIDE, SODIUM LACTATE, POTASSIUM CHLORIDE, CALCIUM CHLORIDE 600; 310; 30; 20 MG/100ML; MG/100ML; MG/100ML; MG/100ML
INJECTION, SOLUTION INTRAVENOUS CONTINUOUS
Status: DISCONTINUED | OUTPATIENT
Start: 2025-02-12 | End: 2025-02-12 | Stop reason: HOSPADM

## 2025-02-12 RX ORDER — NEOMYCIN SULFATE, POLYMYXIN B SULFATE, AND DEXAMETHASONE 3.5; 10000; 1 MG/G; [USP'U]/G; MG/G
OINTMENT OPHTHALMIC
Status: DISCONTINUED | OUTPATIENT
Start: 2025-02-12 | End: 2025-02-12 | Stop reason: HOSPADM

## 2025-02-12 RX ORDER — CEFAZOLIN SODIUM 1 G/3ML
INJECTION, POWDER, FOR SOLUTION INTRAMUSCULAR; INTRAVENOUS
Status: DISCONTINUED | OUTPATIENT
Start: 2025-02-12 | End: 2025-02-12 | Stop reason: HOSPADM

## 2025-02-12 RX ORDER — CYCLOPENTOLATE HYDROCHLORIDE 10 MG/ML
1 SOLUTION/ DROPS OPHTHALMIC
Status: COMPLETED | OUTPATIENT
Start: 2025-02-12 | End: 2025-02-12

## 2025-02-12 RX ORDER — ACETAMINOPHEN 500 MG
1000 TABLET ORAL ONCE
Status: COMPLETED | OUTPATIENT
Start: 2025-02-12 | End: 2025-02-12

## 2025-02-12 RX ORDER — PHENYLEPHRINE HYDROCHLORIDE 25 MG/ML
1 SOLUTION/ DROPS OPHTHALMIC
Status: COMPLETED | OUTPATIENT
Start: 2025-02-12 | End: 2025-02-12

## 2025-02-12 RX ORDER — BALANCED SALT SOLUTION 6.4; .75; .48; .3; 3.9; 1.7 MG/ML; MG/ML; MG/ML; MG/ML; MG/ML; MG/ML
SOLUTION OPHTHALMIC
Status: DISCONTINUED | OUTPATIENT
Start: 2025-02-12 | End: 2025-02-12 | Stop reason: HOSPADM

## 2025-02-12 RX ORDER — LABETALOL HYDROCHLORIDE 5 MG/ML
5 INJECTION, SOLUTION INTRAVENOUS
Status: DISCONTINUED | OUTPATIENT
Start: 2025-02-12 | End: 2025-02-12 | Stop reason: HOSPADM

## 2025-02-12 RX ORDER — PHENYLEPHRINE HYDROCHLORIDE 25 MG/ML
1 SOLUTION/ DROPS OPHTHALMIC
Status: DISCONTINUED | OUTPATIENT
Start: 2025-02-12 | End: 2025-02-12

## 2025-02-12 RX ORDER — HYDRALAZINE HYDROCHLORIDE 20 MG/ML
5 INJECTION INTRAMUSCULAR; INTRAVENOUS
Status: DISCONTINUED | OUTPATIENT
Start: 2025-02-12 | End: 2025-02-12 | Stop reason: HOSPADM

## 2025-02-12 RX ORDER — BALANCED SALT SOLUTION ENRICHED WITH BICARBONATE, DEXTROSE, AND GLUTATHIONE
KIT INTRAOCULAR
Status: DISCONTINUED | OUTPATIENT
Start: 2025-02-12 | End: 2025-02-12 | Stop reason: HOSPADM

## 2025-02-12 RX ORDER — ONDANSETRON 2 MG/ML
INJECTION INTRAMUSCULAR; INTRAVENOUS PRN
Status: DISCONTINUED | OUTPATIENT
Start: 2025-02-12 | End: 2025-02-12 | Stop reason: SURG

## 2025-02-12 RX ORDER — EPHEDRINE SULFATE 50 MG/ML
INJECTION, SOLUTION INTRAVENOUS PRN
Status: DISCONTINUED | OUTPATIENT
Start: 2025-02-12 | End: 2025-02-12 | Stop reason: SURG

## 2025-02-12 RX ORDER — ATROPINE SULFATE 10 MG/ML
SOLUTION/ DROPS OPHTHALMIC
Status: DISCONTINUED | OUTPATIENT
Start: 2025-02-12 | End: 2025-02-12 | Stop reason: HOSPADM

## 2025-02-12 RX ORDER — OXYCODONE HCL 5 MG/5 ML
5 SOLUTION, ORAL ORAL
Status: DISCONTINUED | OUTPATIENT
Start: 2025-02-12 | End: 2025-02-12 | Stop reason: HOSPADM

## 2025-02-12 RX ORDER — PHENYLEPHRINE HYDROCHLORIDE 25 MG/ML
SOLUTION/ DROPS OPHTHALMIC
Status: DISCONTINUED
Start: 2025-02-12 | End: 2025-02-12 | Stop reason: HOSPADM

## 2025-02-12 RX ORDER — PHENYLEPHRINE HYDROCHLORIDE 10 MG/ML
INJECTION, SOLUTION INTRAMUSCULAR; INTRAVENOUS; SUBCUTANEOUS PRN
Status: DISCONTINUED | OUTPATIENT
Start: 2025-02-12 | End: 2025-02-12 | Stop reason: SURG

## 2025-02-12 RX ORDER — LIDOCAINE HYDROCHLORIDE 20 MG/ML
INJECTION, SOLUTION EPIDURAL; INFILTRATION; INTRACAUDAL; PERINEURAL PRN
Status: DISCONTINUED | OUTPATIENT
Start: 2025-02-12 | End: 2025-02-12 | Stop reason: SURG

## 2025-02-12 RX ORDER — OXYCODONE HCL 5 MG/5 ML
2.5 SOLUTION, ORAL ORAL
Status: DISCONTINUED | OUTPATIENT
Start: 2025-02-12 | End: 2025-02-12 | Stop reason: HOSPADM

## 2025-02-12 RX ORDER — DEXAMETHASONE SODIUM PHOSPHATE 4 MG/ML
INJECTION, SOLUTION INTRA-ARTICULAR; INTRALESIONAL; INTRAMUSCULAR; INTRAVENOUS; SOFT TISSUE
Status: DISCONTINUED | OUTPATIENT
Start: 2025-02-12 | End: 2025-02-12 | Stop reason: HOSPADM

## 2025-02-12 RX ORDER — ONDANSETRON 2 MG/ML
4 INJECTION INTRAMUSCULAR; INTRAVENOUS
Status: DISCONTINUED | OUTPATIENT
Start: 2025-02-12 | End: 2025-02-12 | Stop reason: HOSPADM

## 2025-02-12 RX ADMIN — ACETAMINOPHEN 1000 MG: 500 TABLET ORAL at 12:10

## 2025-02-12 RX ADMIN — PHENYLEPHRINE HYDROCHLORIDE 100 MCG: 10 INJECTION INTRAVENOUS at 13:25

## 2025-02-12 RX ADMIN — CYCLOPENTOLATE HYDROCHLORIDE 1 DROP: 10 SOLUTION OPHTHALMIC at 12:10

## 2025-02-12 RX ADMIN — PHENYLEPHRINE HYDROCHLORIDE 100 MCG: 10 INJECTION INTRAVENOUS at 13:26

## 2025-02-12 RX ADMIN — LIDOCAINE HYDROCHLORIDE 80 MG: 20 INJECTION, SOLUTION EPIDURAL; INFILTRATION; INTRACAUDAL; PERINEURAL at 13:07

## 2025-02-12 RX ADMIN — EPHEDRINE SULFATE 5 MG: 50 INJECTION, SOLUTION INTRAVENOUS at 13:35

## 2025-02-12 RX ADMIN — PHENYLEPHRINE HYDROCHLORIDE 1 DROP: 25 SOLUTION/ DROPS OPHTHALMIC at 12:22

## 2025-02-12 RX ADMIN — PHENYLEPHRINE HYDROCHLORIDE 1 DROP: 25 SOLUTION/ DROPS OPHTHALMIC at 12:00

## 2025-02-12 RX ADMIN — FENTANYL CITRATE 25 MCG: 50 INJECTION, SOLUTION INTRAMUSCULAR; INTRAVENOUS at 13:26

## 2025-02-12 RX ADMIN — DEXAMETHASONE SODIUM PHOSPHATE 4 MG: 4 INJECTION INTRA-ARTICULAR; INTRALESIONAL; INTRAMUSCULAR; INTRAVENOUS; SOFT TISSUE at 13:11

## 2025-02-12 RX ADMIN — SODIUM CHLORIDE, POTASSIUM CHLORIDE, SODIUM LACTATE AND CALCIUM CHLORIDE: 600; 310; 30; 20 INJECTION, SOLUTION INTRAVENOUS at 13:00

## 2025-02-12 RX ADMIN — PROPOFOL 100 MG: 10 INJECTION, EMULSION INTRAVENOUS at 13:07

## 2025-02-12 RX ADMIN — PHENYLEPHRINE HYDROCHLORIDE 1 DROP: 25 SOLUTION/ DROPS OPHTHALMIC at 12:10

## 2025-02-12 RX ADMIN — FENTANYL CITRATE 25 MCG: 50 INJECTION, SOLUTION INTRAMUSCULAR; INTRAVENOUS at 13:19

## 2025-02-12 RX ADMIN — PHENYLEPHRINE HYDROCHLORIDE 200 MCG: 10 INJECTION INTRAVENOUS at 13:11

## 2025-02-12 RX ADMIN — CYCLOPENTOLATE HYDROCHLORIDE 1 DROP: 10 SOLUTION OPHTHALMIC at 12:01

## 2025-02-12 RX ADMIN — FENTANYL CITRATE 50 MCG: 50 INJECTION, SOLUTION INTRAMUSCULAR; INTRAVENOUS at 13:07

## 2025-02-12 RX ADMIN — PHENYLEPHRINE HYDROCHLORIDE 100 MCG: 10 INJECTION INTRAVENOUS at 13:18

## 2025-02-12 RX ADMIN — CYCLOPENTOLATE HYDROCHLORIDE 1 DROP: 10 SOLUTION OPHTHALMIC at 12:22

## 2025-02-12 RX ADMIN — PHENYLEPHRINE HYDROCHLORIDE 100 MCG: 10 INJECTION INTRAVENOUS at 13:15

## 2025-02-12 RX ADMIN — EPHEDRINE SULFATE 10 MG: 50 INJECTION, SOLUTION INTRAVENOUS at 13:32

## 2025-02-12 RX ADMIN — ONDANSETRON 4 MG: 2 INJECTION INTRAMUSCULAR; INTRAVENOUS at 13:33

## 2025-02-12 ASSESSMENT — FIBROSIS 4 INDEX: FIB4 SCORE: 2.27

## 2025-02-12 ASSESSMENT — PAIN SCALES - GENERAL: PAIN_LEVEL: 0

## 2025-02-12 NOTE — OR NURSING
1345 -Pt arrived from OR, report received. Connected to monitor, VSS. On 4L mask. LMA in place  Dressings to right eye with shield CDI. No positioning orders.     1405 pt awakening, LMA removed. Denies pain and nausea at this time.     1410 called pt shaun Whitehead to update on recovery.      1428 pt mets phase 2 criteria.      1432 Son Ventura to bedside, d/c instructions discussed, all questions answered.     1448 Per Dr JEROMY Gómez for pt to restart baby aspirin tomorrow     1450 pt meets discharge criteria and states readiness to d/c home. pt dressing with assistance, IV removed, and d/c to home with family. Escorted out by staff. All belongings sent with pt.

## 2025-02-12 NOTE — DISCHARGE INSTRUCTIONS
RETINAL DETACHMENT OR VITRECTOMY INSTRUCTIONS    These instructions are provided so that you can have the best chance for a successful recovery from your recent eye surgery. In general, they will remain in effect for at least two to three weeks following your operation.   Please call my office to see me within one week following your discharge from the hospital. You can make this appointment by calling my office. Call Monday through Friday from 8:00 am to 5:00 pm. For patients who live a great distance from my office, I may ask you to make arrangements with your local ophthalmologist for follow up care instead of returning here.     Medications:     There medications are to be used while you are awake. A good night's sleep is an important part of the healing process.     1.   Tobradex, one drop in the operated eye every 4 hours.     2.   Tylenol should be sufficient for any pain; if not, please call my office.     3.   Other medications as directed.     Eye Care:    After leaving the hospital, a patch over the operated eye is considered optional. If a patch is worn at home, it should be changed at least once daily. These patches can usually be purchased at your local drug store.   If the eyelids become stuck together because of discharge, gently wash them with a clean damp washcloth moistened with warm tap water. It is advisable to wash under the eye with a damp washcloth at least once a day; be careful not to touch or press on the eye itself.     Appearance and sensation in the operated eye:    1.   It is normal for the operated eye to remain somewhat red, swollen and slightly irritated for four to six weeks.     2.   It is expected that there will be an increased amount of tearing and mattering in the operated eye.     Return of eyesight:     1.   You final best vision will not be known until the eye and retina are completely healed, which takes at least two to three months and sometimes much longer.     2.    Following this type of surgery, you may require a change in the prescription for your glasses or contact lenses. In general, checking for a new prescription is not done until at least two to three months following your surgery.     Physical Activities:    Things to Avoid:    1.   Aspirin  2.   Lifting or moving heavy objects (20 pounds or more)  3.   Rubbing the operated eye.   4.   Strenuous or jarring physical exertion such as running, jumping, aerobics and swimming.   5.   Driving a car.  6.   Garden or yard work.  7.   Wearing of contact lenses in the operated eye for 4-6 weeks.  8.   Returning to work or school; depending upon the nature of eye surgery and occupation, I will advise you to refrain from returning to school or work for anywhere from 2-8 weeks.   9.   Air travel unless otherwise instructed.   10. Reading unless otherwise instructed.     Permissible Activities:    1.   Watching television and using your eyes in moderation.   2.   Cooking and light housework.   3.   Riding in a car on paved roads.  4.   Showering, bathing and shaving; however, avoid getting water in your eyes.     Indications for calling my office:     1.   Increased swelling or redness of the eyelids.  2.   Increased persistent pain in the eye which is not relieved by Tylenol or which does not go away within 24 hours.   3.   Decreased vision.         What to Expect Post Anesthesia    Rest and take it easy for the first 24 hours.  A responsible adult is recommended to remain with you during that time.  It is normal to feel sleepy.  We encourage you to not do anything that requires balance, judgment or coordination.    FOR 24 HOURS DO NOT:  Drive, operate machinery or run household appliances.  Drink beer or alcoholic beverages.  Make important decisions or sign legal documents.    To avoid nausea, slowly advance diet as tolerated, avoiding spicy or greasy foods for the first day.  Add more substantial food to your diet according to  your provider's instructions.  Babies can be fed formula or breast milk as soon as they are hungry.  INCREASE FLUIDS AND FIBER TO AVOID CONSTIPATION.    MILD FLU-LIKE SYMPTOMS ARE NORMAL.  YOU MAY EXPERIENCE GENERALIZED MUSCLE ACHES, THROAT IRRITATION, HEADACHE AND/OR SOME NAUSEA.    If any questions arise, call your provider  Dr Andrade [phone].  If your provider is not available, please feel free to call the Surgical Center at [phone].    MEDICATIONS: Resume taking daily medication.  Take prescribed pain medication with food.  If no medication is prescribed, you may take non-aspirin pain medication if needed.  PAIN MEDICATION CAN BE VERY CONSTIPATING.  Take a stool softener or laxative such as senokot, pericolace, or milk of magnesia if needed.    Last pain medication given at _ Tylenol given at 12:10pm, ok for next dose after 6:10pm, if needed.

## 2025-02-12 NOTE — ANESTHESIA TIME REPORT
Anesthesia Start and Stop Event Times       Date Time Event    2/12/2025 1236 Ready for Procedure     1300 Anesthesia Start     1346 Anesthesia Stop          Responsible Staff  02/12/25      Name Role Begin End    Grace Sierra M.D. Anesth 1300 1346          Overtime Reason:  no overtime (within assigned shift)    Comments:

## 2025-02-12 NOTE — OP REPORT
Procedure: 25g  Pars Plana Vitrectomy, membrane peel and endolaser, right  eye     Pre op Diagnosis: Epiretinal membrane, right eye     Post op Diagnosis: Epiretinal membrane and retinal tears ,right eye     Findings: Thick epiretinal membrane, scatted drusen and RPE changes in the macula with several small retinal tears, right eye       Immediately prior to procedure, time out was performed to include correct patient, agreement on procedure to be performed, correct side, accurate procedure consent, antibiotics, fluids, safety precautions, and availability of any special implants that might be required.    The patient was taken back into operating area. Subsequently the patient received general anesthesia from the anesthesia department. The eye was then prepped and draped in the usual sterile fashion for ophthalmic surgery, and a lid speculum was placed. Betadine was placed in the eye.    A 25-gauge trocar-cannula system was used to place a cannula in the typical beveled entry with conjunctival displacement in the inferotemporal quadrant. An infusion line was assembled and flushed for all in-line air. The infusion line was placed into the vitreous cavity and was directly visualized prior to unclamping. Once it was unclamped, 2 additional cannulas were placed in the superotemporal and superonasal quadrants in a similar fashion. At this time, the vitreous cutter and light pipe were introduced into the eye and using the wide-angle viewing system, a core vitrectomy was performed. At all times throughout the case light exposure to the macula and fovea were minimized to reduce the risk of phototoxicity. Using ILM forceps epiretinal membrane was peeled around fovea.  Next, BBG was injected into the eye to highlight the edge of the ILM. Complete internal limiting  and epiretinal membrane peel was done around the fovea.  At the end of the surgery , several small tears were noted on periphery with depressed exam. Endolaser  was done around tears.  Sclerostomies were watertight. Infusion cannula was removed and site was closed. Subconjunctival injection of dexamethasone and Ancef were given. Lid speculum was removed. Atropine, Betadine and Maxitrol Ophthalmic ointment was placed in the eye and the eye was patched and overlayed with Ledesma shield.     The patient tolerated the procedure well without any complications, was taken to the postoperative recovery area in good condition.

## 2025-02-12 NOTE — ANESTHESIA POSTPROCEDURE EVALUATION
Patient: Jose Fallon    Procedure Summary       Date: 02/12/25 Room / Location: [facility] ROOM 24 / SURGERY SAME DAY [facility]    Anesthesia Start: 1300 Anesthesia Stop: 1346    Procedure: RIGHT EYE PARS PLANA VITRECTOMY, MEMBRANE DISSECTION, BRILLIANT BLUE G (Right: Eye) Diagnosis: (EPIRETINAL MEMBRANE RIGHT EYE)    Surgeons: Abbie Andrade M.D. Responsible Provider: Grace Sierra M.D.    Anesthesia Type: general ASA Status: 2            Final Anesthesia Type: general  Last vitals  BP   Blood Pressure : 100/59    Temp   36.3 °C (97.4 °F)    Pulse   82   Resp   14    SpO2   97 %      Anesthesia Post Evaluation    Patient location during evaluation: PACU  Patient participation: complete - patient participated  Level of consciousness: awake  Pain score: 0    Airway patency: patent  Anesthetic complications: no  Cardiovascular status: hemodynamically stable  Respiratory status: acceptable  Hydration status: acceptable    PONV: none          No notable events documented.     Nurse Pain Score: 0 (NPRS)

## 2025-02-12 NOTE — ANESTHESIA PREPROCEDURE EVALUATION
Case: [id number] Anesthesia Start Date/Time: 02/12/25 1300    Procedure: RIGHT EYE PARS PLANA VITRECTOMY, MEMBRANE DISSECTION, BRILLIANT BLUE G    Pre-op diagnosis: EPIRETINAL MEMBRANE RIGHT EYE    Location: [facility] ROOM 24 / SURGERY SAME DAY [facility]    Surgeons: Abbie Andrade M.D.          73 yo w/left epiretinal membrane    Relevant Problems   ANESTHESIA (within normal limits)      CARDIAC   (positive) Agatston CAC score 200-399   (positive) Carotid atherosclerosis, bilateral   (positive) Hypertension   (positive) Internal and external hemorrhoids without complication, per 2/5/16 Colonoscopy      GI   (positive) Hiatal hernia         (positive) Bilateral renal cysts      Other   (positive) Arthritis of left knee   (positive) Hyperlipidemia   (positive) Prediabetes     Preop EKG showed q waves in anteroseptal leads which is virtually unchanged from EKG 9/2022.  Patient denies CP/SOB, change to functional status with good exercise tolerance of >4 mets.  Stress echo 2019 unremarkable.  Patient sees cardiologist yearly  Suspected PFO on past echo.  No further management per cards    Physical Exam    Airway   Mallampati: II  TM distance: >3 FB  Neck ROM: full       Cardiovascular   Rhythm: regular  Rate: normal  (-) murmur     Dental - normal exam           Pulmonary   Breath sounds clear to auscultation     Abdominal    Neurological - normal exam                   Anesthesia Plan    ASA 2       Plan - general       Airway plan will be LMA          Induction: intravenous    Postoperative Plan: Postoperative administration of opioids is intended.    Pertinent diagnostic labs and testing reviewed    Informed Consent:    Anesthetic plan and risks discussed with patient.    Use of blood products discussed with: patient whom consented to blood products.

## 2025-02-12 NOTE — ANESTHESIA PROCEDURE NOTES
Airway    Date/Time: 2/12/2025 1:09 PM    Performed by: Grace Sierra M.D.  Authorized by: Grace Sierra M.D.    Location:  OR  Urgency:  Elective  Indications for Airway Management:  Anesthesia      Spontaneous Ventilation: absent    Sedation Level:  Deep  Preoxygenated: Yes    Mask Difficulty Assessment:  0 - not attempted  Final Airway Type:  Supraglottic airway  Final Supraglottic Airway:  Standard LMA    SGA Size:  4  Number of Attempts at Approach:  1

## 2025-02-13 RX ORDER — BALANCED SALT SOLUTION ENRICHED WITH BICARBONATE, DEXTROSE, AND GLUTATHIONE
KIT INTRAOCULAR
Status: DISPENSED
Start: 2025-02-12 | End: 2025-02-12

## 2025-03-04 DIAGNOSIS — R73.03 PREDIABETES: ICD-10-CM

## 2025-03-04 RX ORDER — METFORMIN HYDROCHLORIDE 500 MG/1
500 TABLET, EXTENDED RELEASE ORAL
Qty: 90 TABLET | Refills: 3 | Status: SHIPPED | OUTPATIENT
Start: 2025-03-04

## 2025-05-29 DIAGNOSIS — E78.5 DYSLIPIDEMIA: ICD-10-CM

## 2025-05-30 ENCOUNTER — TELEPHONE (OUTPATIENT)
Dept: INTERNAL MEDICINE | Facility: IMAGING CENTER | Age: 75
End: 2025-05-30
Payer: COMMERCIAL

## 2025-05-30 DIAGNOSIS — Z12.5 PROSTATE CANCER SCREENING: ICD-10-CM

## 2025-05-30 DIAGNOSIS — Z79.899 MEDICATION MANAGEMENT: Primary | ICD-10-CM

## 2025-05-30 RX ORDER — SIMVASTATIN 20 MG
20 TABLET ORAL
Qty: 90 TABLET | Refills: 3 | Status: SHIPPED | OUTPATIENT
Start: 2025-05-30

## 2025-05-30 NOTE — TELEPHONE ENCOUNTER
----- Message from Nurse Bonnie LAZARO R.N. sent at 5/30/2025 10:40 AM PDT -----  Please add fasting labs.Thanks!

## 2025-06-03 ENCOUNTER — HOSPITAL ENCOUNTER (OUTPATIENT)
Facility: MEDICAL CENTER | Age: 75
End: 2025-06-03
Attending: FAMILY MEDICINE
Payer: COMMERCIAL

## 2025-06-03 ENCOUNTER — APPOINTMENT (OUTPATIENT)
Dept: INTERNAL MEDICINE | Facility: IMAGING CENTER | Age: 75
End: 2025-06-03
Payer: COMMERCIAL

## 2025-06-03 DIAGNOSIS — Z12.5 PROSTATE CANCER SCREENING: ICD-10-CM

## 2025-06-03 DIAGNOSIS — Z79.899 MEDICATION MANAGEMENT: ICD-10-CM

## 2025-06-03 LAB
25(OH)D3 SERPL-MCNC: 90 NG/ML (ref 30–100)
ALBUMIN SERPL BCP-MCNC: 4.1 G/DL (ref 3.2–4.9)
ALBUMIN/GLOB SERPL: 1.3 G/DL
ALP SERPL-CCNC: 62 U/L (ref 30–99)
ALT SERPL-CCNC: 25 U/L (ref 2–50)
ANION GAP SERPL CALC-SCNC: 11 MMOL/L (ref 7–16)
AST SERPL-CCNC: 38 U/L (ref 12–45)
BILIRUB SERPL-MCNC: 1 MG/DL (ref 0.1–1.5)
BUN SERPL-MCNC: 22 MG/DL (ref 8–22)
CALCIUM ALBUM COR SERPL-MCNC: 8.9 MG/DL (ref 8.5–10.5)
CALCIUM SERPL-MCNC: 9 MG/DL (ref 8.5–10.5)
CHLORIDE SERPL-SCNC: 100 MMOL/L (ref 96–112)
CHOLEST SERPL-MCNC: 126 MG/DL (ref 100–199)
CO2 SERPL-SCNC: 26 MMOL/L (ref 20–33)
CREAT SERPL-MCNC: 1.34 MG/DL (ref 0.5–1.4)
ERYTHROCYTE [DISTWIDTH] IN BLOOD BY AUTOMATED COUNT: 43.1 FL (ref 35.9–50)
EST. AVERAGE GLUCOSE BLD GHB EST-MCNC: 120 MG/DL
GFR SERPLBLD CREATININE-BSD FMLA CKD-EPI: 55 ML/MIN/1.73 M 2
GLOBULIN SER CALC-MCNC: 3.1 G/DL (ref 1.9–3.5)
GLUCOSE SERPL-MCNC: 97 MG/DL (ref 65–99)
HBA1C MFR BLD: 5.8 % (ref 4–5.6)
HCT VFR BLD AUTO: 43.4 % (ref 42–52)
HDLC SERPL-MCNC: 65 MG/DL
HGB BLD-MCNC: 14.3 G/DL (ref 14–18)
LDLC SERPL CALC-MCNC: 48 MG/DL
MCH RBC QN AUTO: 30.7 PG (ref 27–33)
MCHC RBC AUTO-ENTMCNC: 32.9 G/DL (ref 32.3–36.5)
MCV RBC AUTO: 93.1 FL (ref 81.4–97.8)
PLATELET # BLD AUTO: 227 K/UL (ref 164–446)
PMV BLD AUTO: 10.2 FL (ref 9–12.9)
POTASSIUM SERPL-SCNC: 3.9 MMOL/L (ref 3.6–5.5)
PROT SERPL-MCNC: 7.2 G/DL (ref 6–8.2)
PSA SERPL DL<=0.01 NG/ML-MCNC: 0.94 NG/ML (ref 0–4)
RBC # BLD AUTO: 4.66 M/UL (ref 4.7–6.1)
SODIUM SERPL-SCNC: 137 MMOL/L (ref 135–145)
TRIGL SERPL-MCNC: 64 MG/DL (ref 0–149)
TSH SERPL-ACNC: 1.8 UIU/ML (ref 0.38–5.33)
VIT B12 SERPL-MCNC: 1065 PG/ML (ref 211–911)
WBC # BLD AUTO: 7.7 K/UL (ref 4.8–10.8)

## 2025-06-03 PROCEDURE — 83036 HEMOGLOBIN GLYCOSYLATED A1C: CPT

## 2025-06-03 PROCEDURE — 84153 ASSAY OF PSA TOTAL: CPT

## 2025-06-03 PROCEDURE — 84443 ASSAY THYROID STIM HORMONE: CPT

## 2025-06-03 PROCEDURE — 82306 VITAMIN D 25 HYDROXY: CPT

## 2025-06-03 PROCEDURE — 85027 COMPLETE CBC AUTOMATED: CPT

## 2025-06-03 PROCEDURE — 80053 COMPREHEN METABOLIC PANEL: CPT

## 2025-06-03 PROCEDURE — 80061 LIPID PANEL: CPT

## 2025-06-03 PROCEDURE — 82607 VITAMIN B-12: CPT

## 2025-06-06 ENCOUNTER — OFFICE VISIT (OUTPATIENT)
Dept: INTERNAL MEDICINE | Facility: IMAGING CENTER | Age: 75
End: 2025-06-06
Payer: COMMERCIAL

## 2025-06-06 VITALS
HEIGHT: 72 IN | BODY MASS INDEX: 22.75 KG/M2 | SYSTOLIC BLOOD PRESSURE: 124 MMHG | WEIGHT: 168 LBS | TEMPERATURE: 97.6 F | HEART RATE: 71 BPM | DIASTOLIC BLOOD PRESSURE: 60 MMHG | OXYGEN SATURATION: 97 % | RESPIRATION RATE: 14 BRPM

## 2025-06-06 DIAGNOSIS — Z00.00 WELLNESS EXAMINATION: Primary | ICD-10-CM

## 2025-06-06 PROCEDURE — 99397 PER PM REEVAL EST PAT 65+ YR: CPT | Performed by: FAMILY MEDICINE

## 2025-06-06 PROCEDURE — 3074F SYST BP LT 130 MM HG: CPT | Performed by: FAMILY MEDICINE

## 2025-06-06 PROCEDURE — 3078F DIAST BP <80 MM HG: CPT | Performed by: FAMILY MEDICINE

## 2025-06-06 ASSESSMENT — PATIENT HEALTH QUESTIONNAIRE - PHQ9: CLINICAL INTERPRETATION OF PHQ2 SCORE: 0

## 2025-06-06 ASSESSMENT — FIBROSIS 4 INDEX: FIB4 SCORE: 2.54

## 2025-06-06 NOTE — PROGRESS NOTES
Verbal consent was acquired by the patient to use [employer] ambient listening note generation during this visit     Patient is a 75 y.o.male.established patient      History of Present Illness  The patient presents for a routine  annual examination.    General Health and Lifestyle  He reports no current health concerns and maintains an active lifestyle through occupational activities, walking, and playing golf. He is under the care of a cardiologist at [facility]      The patient has a history of atherosclerosis but does not experience dyspnea during physical exertion such as walking or stair climbing. He also reports no angina or vertigo. An electrocardiogram (EKG) was performed prior to his ocular surgery, which did not reveal any abnormalities.    H/o Knee Arthroplasty and Ocular Surgery  He underwent knee arthroplasty performed by Dr. Villafuerte 2022  and ocular surgery in February 2025. The ocular surgery involved the removal of an epiretinal membrane that was distorting the retina, causing visual distortion in his dominant eye. The vitreous humor was replaced due to the presence of floaters. Postoperatively, his vision is clear, but he continues to experience difficulty with focus. He was informed that it may take 1 to 2 years for his retina to return to its original state.  - Onset: Ocular surgery in February 2025.  - Location: Dominant eye.  - Character: Visual distortion due to epiretinal membrane; floaters; postoperative clear vision with difficulty focusing.  - Duration: Postoperative difficulty with focus; expected recovery time of 1 to 2 years.    Dietary Modifications and Physical Activity  He has made dietary modifications, including reducing his sugar intake, which has resulted in a decrease in his glycated hemoglobin (A1c) levels from 6.1% to 5.8%. He engages in regular physical activity, including walking several miles every few weeks and frequent stair climbing at work. He has increased his consumption  "of red meat and chicken but does not take any dietary supplements. He reports no significant changes in his energy levels since December 2024. He takes vitamin D supplements daily at night and has been making efforts to increase his water intake. He occasionally uses ibuprofen for cephalalgia.  - Onset: Dietary modifications and physical activity ongoing.  - Character: Reduced sugar intake; decreased A1c levels; increased consumption of red meat and chicken; vitamin D supplements; occasional ibuprofen use for cephalalgia.  - Duration: No significant changes in energy levels since December 2024.    Anxiety-improved by Lexapro  He occasionally experiences anxiety, particularly when faced with work-related stressors or personal issues.  - Onset: Occasional.  - Character: Anxiety related to work-related stressors or personal issues.    COVID-19 and Other Health Aspects  He has declined  COVID-19 boosters and contracted the virus in 2023. He reports no hearing difficulties. He reports no gastrointestinal issues, maintains regular bowel movements, and normal urination. He consumes beer with meals 3 to 4 times a week but does not smoke or use other substances. He reports no paresthesia or dysesthesia in his legs, and no recent orthopedic issues involving his knees, hips, back, or shoulders.    PAST SURGICAL HISTORY:  - Knee arthroplasty 2022  - Ocular surgery in February 2025    SOCIAL HISTORY  He drinks beer with meals on occasion, about three or four times a week. He does not smoke or use any other substances.    FAMILY HISTORY  His mother had dementia.            /60   Pulse 71   Temp 36.4 °C (97.6 °F)   Resp 14   Ht 1.829 m (6' 0.01\")   Wt 76.2 kg (168 lb)   SpO2 97% , Body mass index is 22.78 kg/m².    Physical Exam  General Appearance: Normal.  Vital signs: Within normal limits.  HEENT: External ear canals and tympanic membranes intact. Mucous membranes moist, no erythema, no exudate.  Respiratory: Clear " to auscultation, no wheezing, rales or rhonchi.  Cardiovascular: Regular rate and rhythm, no murmurs, rubs, or gallops.  Gastrointestinal: Soft, no tenderness, no distention, no masses.  Back, Musculoskeletal: No joint or muscular abnormalities noted.  Extremities: No edema, no cyanosis.  Skin: Warm and dry, no rash.  Neurological: Normal.               Hospital Outpatient Visit on 06/03/2025   Component Date Value Ref Range Status    WBC 06/03/2025 7.7  4.8 - 10.8 K/uL Final    RBC 06/03/2025 4.66 (L)  4.70 - 6.10 M/uL Final    Hemoglobin 06/03/2025 14.3  14.0 - 18.0 g/dL Final    Hematocrit 06/03/2025 43.4  42.0 - 52.0 % Final    MCV 06/03/2025 93.1  81.4 - 97.8 fL Final    MCH 06/03/2025 30.7  27.0 - 33.0 pg Final    MCHC 06/03/2025 32.9  32.3 - 36.5 g/dL Final    RDW 06/03/2025 43.1  35.9 - 50.0 fL Final    Platelet Count 06/03/2025 227  164 - 446 K/uL Final    MPV 06/03/2025 10.2  9.0 - 12.9 fL Final    Prostatic Specific Antigen Tot 06/03/2025 0.94  0.00 - 4.00 ng/mL Final    TSH 06/03/2025 1.800  0.380 - 5.330 uIU/mL Final    Comment: The 2011 American Thyroid Association (MARY BETH) guidelines  recommended that the interpretation of thyroid function in  pregnancy be based on trimester specific reference ranges.    1st Trimester  0.100-2.500 uIU/L  2nd Trimester  0.200-3.000 uIU/L  3rd Trimester  0.300-3.500 uIU/L    These established reference ranges have not been validated  at [facility].      Cholesterol,Tot 06/03/2025 126  100 - 199 mg/dL Final    Triglycerides 06/03/2025 64  0 - 149 mg/dL Final    HDL 06/03/2025 65  >=40 mg/dL Final    LDL 06/03/2025 48  <100 mg/dL Final    Sodium 06/03/2025 137  135 - 145 mmol/L Final    Potassium 06/03/2025 3.9  3.6 - 5.5 mmol/L Final    Chloride 06/03/2025 100  96 - 112 mmol/L Final    Co2 06/03/2025 26  20 - 33 mmol/L Final    Anion Gap 06/03/2025 11.0  7.0 - 16.0 Final    Glucose 06/03/2025 97  65 - 99 mg/dL Final    Bun 06/03/2025 22  8 - 22 mg/dL Final     Creatinine 06/03/2025 1.34  0.50 - 1.40 mg/dL Final    Calcium 06/03/2025 9.0  8.5 - 10.5 mg/dL Final    Correct Calcium 06/03/2025 8.9  8.5 - 10.5 mg/dL Final    AST(SGOT) 06/03/2025 38  12 - 45 U/L Final    ALT(SGPT) 06/03/2025 25  2 - 50 U/L Final    Alkaline Phosphatase 06/03/2025 62  30 - 99 U/L Final    Total Bilirubin 06/03/2025 1.0  0.1 - 1.5 mg/dL Final    Albumin 06/03/2025 4.1  3.2 - 4.9 g/dL Final    Total Protein 06/03/2025 7.2  6.0 - 8.2 g/dL Final    Globulin 06/03/2025 3.1  1.9 - 3.5 g/dL Final    A-G Ratio 06/03/2025 1.3  g/dL Final    Vitamin B12 -True Cobalamin 06/03/2025 1065 (H)  211 - 911 pg/mL Final    Glycohemoglobin 06/03/2025 5.8 (H)  4.0 - 5.6 % Final    Comment: Increased risk for diabetes:  5.7 -6.4%  Diabetes:  >6.4%  Glycemic control for adults with diabetes:  <7.0%    The above interpretations are per ADA guidelines.  Diagnosis  of diabetes mellitus on the basis of elevated Hemoglobin A1c  should be confirmed by repeating the Hb A1c test.      Est Avg Glucose 06/03/2025 120  mg/dL Final    Comment: The eAG calculation is based on the A1c-Derived Daily Glucose  (ADAG) study.  See the ADA's website for additional information.      25-Hydroxy   Vitamin D 25 06/03/2025 90  30 - 100 ng/mL Final    Comment: Adult Ranges:   <20 ng/mL - Deficiency  20-29 ng/mL - Insufficiency   ng/mL - Sufficiency  Electrochemiluminescence binding assay performed using Roche stefania e  immunoassay analyzer.  The Elecsys Vitamin D total II assay is intended for  the quantitative determination of total 25 hydroxyvitamin D in human serum  and plasma. This assay is to be used as an aid in the assessment of vitamin  D sufficiency in adults.      GFR (CKD-EPI) 06/03/2025 55 (A)  >60 mL/min/1.73 m 2 Final    Comment: Estimated Glomerular Filtration Rate is calculated using  race neutral CKD-EPI 2021 equation per NKF-ASN recommendations.                Assessment & Plan  Health Maintenance  Blood pressure  readings are within the normal range. A1c levels have decreased from 6.1 to 5.8, indicating good control over blood sugar levels. PSA levels are low, thyroid function is normal, and lipid panel is excellent with a decrease in LDL cholesterol and an increase in HDL cholesterol. Electrolyte, calcium, and liver function tests are all normal. B12 levels are satisfactory, but vitamin D levels are slightly elevated, albeit within the normal range. Kidney function remains stable since 01/2025. Up-to-date with all necessary vaccinations. Weight is within the healthy range, and he maintains an active lifestyle. Colonoscopy is scheduled for 2026, and tetanus shot is due in 3 years.  Treatment plan: Advised to reduce vitamin D supplement intake to 3 times a week instead of daily. Encouraged to continue current medication regimen, including simvastatin, Zetia, irbesartan, hydrochlorothiazide, and antidepressant. Advised to continue taking CoQ10 due to its benefits for individuals on statins.    Pre-Diabetes    A1c has improved from 6.1 to 5.8 with dietary modifications, specifically reducing sugar intake.  Treatment plan: Advised to continue current regimen of metformin due to its additional health benefits beyond blood sugar control.    Hyperlipidemia  Lipid panel shows a decrease in LDL cholesterol and an increase in HDL cholesterol.  Treatment plan: Advised to continue current medications, simvastatin and Zetia.    Hypertension  Blood pressure is well-controlled on current regimen.  Treatment plan: Advised to continue taking irbesartan and hydrochlorothiazide.    Anxiety  Experiences occasional anxiety, particularly related to work and personal stressors.  Treatment plan: Advised to continue current low-dose antidepressant regimen.    Post-Surgical Follow-Up  Underwent eye surgery in 02/2025 to remove an epiretinal membrane and replace the gel due to floaters. Vision is clear post-surgery, but still adjusting. It may take 1  "to 2 years for vision to fully stabilize.    Follow-up: Colonoscopy is due for 2026, and tetanus shot is due in 3 years.                There are no diagnoses linked to this encounter.            Note to patients:  This physician note is written for the purpose of communication between medical providers and billing purposes.  There may be aspects of this documentation that are simplified for efficiency and clarity.  Physician notes are not intended to capture the entirety of the patient experience.  If you have questions about the way your medical condition and care was documented, please do not hesitate to bring this up at your next visit.     Portions of this chart may have been created with Dragon voice recognition software.  Occasional wrong-word or \"sound alike\" substitutions may have occurred due to the inherent limitations of voice recognition software.  Please read the chart carefully and recognize, using context, where the substitutions have occurred.    "

## 2025-06-12 RX ORDER — ESCITALOPRAM OXALATE 10 MG/1
10 TABLET ORAL
Qty: 90 TABLET | Refills: 3 | Status: SHIPPED | OUTPATIENT
Start: 2025-06-12

## 2025-06-24 DIAGNOSIS — I10 ESSENTIAL HYPERTENSION: ICD-10-CM

## 2025-06-24 RX ORDER — IRBESARTAN AND HYDROCHLOROTHIAZIDE 150; 12.5 MG/1; MG/1
TABLET, FILM COATED ORAL
Qty: 90 TABLET | Refills: 3 | Status: SHIPPED | OUTPATIENT
Start: 2025-06-24

## (undated) DEVICE — LENS GRIESHABER MACULAR

## (undated) DEVICE — CANISTER SUCTION RIGID RED 1500CC (40EA/CA)

## (undated) DEVICE — PACK VITRECTOMY 25G 20K V W (1EA/BX)

## (undated) DEVICE — LACTATED RINGERS INJ 1000 ML - (14EA/CA 60CA/PF)

## (undated) DEVICE — FORCEPS SURGICAL GRIESHABER REVOLUTION SERRATED 25G (6EA/BX)

## (undated) DEVICE — TIP NEEDLE SOFT 25G X 0.8MM (10EA/BX)

## (undated) DEVICE — WATER IRRIGATION STERILE 1000ML (12EA/CA)

## (undated) DEVICE — TUBING CLEARLINK DUO-VENT - C-FLO (48EA/CA)

## (undated) DEVICE — SUTURE GENERAL

## (undated) DEVICE — ELECTRODE DUAL RETURN W/ CORD - (50/PK)

## (undated) DEVICE — TUBE CONNECTING SUCTION - CLEAR PLASTIC STERILE 72 IN (50EA/CA)

## (undated) DEVICE — CANNULA O2 COMFORT SOFT EAR ADULT 7 FT TUBING (50/CA)

## (undated) DEVICE — MASK OXYGEN VNYL ADLT MED CONC WITH 7 FOOT TUBING - (50EA/CA)

## (undated) DEVICE — SUCTION INSTRUMENT YANKAUER BULBOUS TIP W/O VENT (50EA/CA)

## (undated) DEVICE — PAD EYE GAUZE COVERED OVAL 1 5/8 X 2 5/8" STERILE"

## (undated) DEVICE — SENSOR OXIMETER ADULT SPO2 RD SET (20EA/BX)

## (undated) DEVICE — SODIUM CHL IRRIGATION 0.9% 1000ML (12EA/CA)

## (undated) DEVICE — SET LEADWIRE 5 LEAD BEDSIDE DISPOSABLE ECG (1SET OF 5/EA)

## (undated) DEVICE — KIT  I.V. START (100EA/CA)

## (undated) DEVICE — CANISTER SUCTION 3000ML MECHANICAL FILTER AUTO SHUTOFF MEDI-VAC NONSTERILE LF DISP (40EA/CA)

## (undated) DEVICE — GOWN WARMING STANDARD FLEX - (30/CA)

## (undated) DEVICE — SLEEVE VASO DVT COMPRESSION CALF MED - (10PR/CA)

## (undated) DEVICE — MASK AIRWAY SIZE 4 UNIQUE SILICON (10EA/BX)

## (undated) DEVICE — GLOVE SZ 7 BIOGEL PI MICRO - PF LF (50PR/BX 4BX/CA)

## (undated) DEVICE — TOWEL STOP TIMEOUT SAFETY FLAG (40EA/CA)

## (undated) DEVICE — PACK VITRECTOMY (1EA/CA)